# Patient Record
Sex: FEMALE | Race: BLACK OR AFRICAN AMERICAN | NOT HISPANIC OR LATINO | Employment: OTHER | URBAN - METROPOLITAN AREA
[De-identification: names, ages, dates, MRNs, and addresses within clinical notes are randomized per-mention and may not be internally consistent; named-entity substitution may affect disease eponyms.]

---

## 2017-02-10 ENCOUNTER — HISTORICAL (OUTPATIENT)
Dept: HEMATOLOGY/ONCOLOGY | Facility: CLINIC | Age: 47
End: 2017-02-10

## 2019-07-11 ENCOUNTER — HISTORICAL (OUTPATIENT)
Dept: CARDIOLOGY | Facility: HOSPITAL | Age: 49
End: 2019-07-11

## 2020-01-09 ENCOUNTER — HISTORICAL (OUTPATIENT)
Dept: ADMINISTRATIVE | Facility: HOSPITAL | Age: 50
End: 2020-01-09

## 2020-01-09 LAB
EOSINOPHIL # BLD AUTO: 0 X10(3)/MCL (ref 0–0.9)
EOSINOPHIL NFR BLD AUTO: 0.6 %
ERYTHROCYTE [DISTWIDTH] IN BLOOD BY AUTOMATED COUNT: 12.8 % (ref 11.5–17)
HCT VFR BLD AUTO: 43.4 % (ref 37–47)
HGB BLD-MCNC: 13.5 GM/DL (ref 12–16)
LYMPHOCYTES # BLD AUTO: 1.2 X10(3)/MCL (ref 0.6–4.6)
LYMPHOCYTES NFR BLD AUTO: 38.7 %
MCH RBC QN AUTO: 29.8 PG (ref 27–31)
MCHC RBC AUTO-ENTMCNC: 31.1 GM/DL (ref 33–36)
MCV RBC AUTO: 95.8 FL (ref 80–94)
MONOCYTES # BLD AUTO: 0.3 X10(3)/MCL (ref 0.1–1.3)
MONOCYTES NFR BLD AUTO: 10.1 %
NEUTROPHILS # BLD AUTO: 1.6 X10(3)/MCL (ref 2.1–9.2)
NEUTROPHILS NFR BLD AUTO: 49.7 %
PLATELET # BLD AUTO: 215 X10(3)/MCL (ref 130–400)
PMV BLD AUTO: 10.6 FL (ref 9.4–12.4)
RBC # BLD AUTO: 4.53 X10(6)/MCL (ref 4.2–5.4)
WBC # SPEC AUTO: 3.2 X10(3)/MCL (ref 4.5–11.5)

## 2021-03-14 LAB — GASTROCULT GAST QL: NEGATIVE

## 2021-09-02 ENCOUNTER — HISTORICAL (OUTPATIENT)
Dept: ADMINISTRATIVE | Facility: HOSPITAL | Age: 51
End: 2021-09-02

## 2021-10-26 ENCOUNTER — HISTORICAL (OUTPATIENT)
Dept: ADMINISTRATIVE | Facility: HOSPITAL | Age: 51
End: 2021-10-26

## 2022-03-19 ENCOUNTER — HISTORICAL (OUTPATIENT)
Dept: ADMINISTRATIVE | Facility: HOSPITAL | Age: 52
End: 2022-03-19

## 2022-04-07 ENCOUNTER — HISTORICAL (OUTPATIENT)
Dept: ADMINISTRATIVE | Facility: HOSPITAL | Age: 52
End: 2022-04-07
Payer: MEDICARE

## 2022-04-23 VITALS
HEIGHT: 66 IN | DIASTOLIC BLOOD PRESSURE: 85 MMHG | SYSTOLIC BLOOD PRESSURE: 148 MMHG | BODY MASS INDEX: 20.73 KG/M2 | WEIGHT: 129 LBS

## 2022-04-29 NOTE — PROGRESS NOTES
Patient:   Aleyda Peacock             MRN: [id number]            FIN: [id number]               Age:   49 years     Sex:  Female     :  1970   Associated Diagnoses:   None   Author:   Lyssa VILLALBA MD, Arpan NAVARRETE      Visit Information     PCP:  Dr Tuttle    Problem List:  1. Chronic ITP-- stable plts.    2. Dysregulated menstrual bleeding--much improved  3. Mild Leukopenia- chronic.   4. Crohns Disease--  managed per GI      Current therapy:  Observation--secondary to stable platelet count since 2016 (she has not required Nplate since that time)      Treatment History:  Steroids (prednisone)  Promacta 25 mg by mouth daily, mid-2016 x ~2-3 weeks. Discontinued secondary to loss of appetite generalized body aches UTI and leg pain.  N-plate given every other week.   5-7mcg/kg --started May 2012-->attempted to transition patient to Promacta in 2016. She could not tolerate medication and after ~ 2-3 weeks was changed back N-plate.  N-plate to be held if platelet count greater than 150,000    CLINICAL HISTORY:     ALEYDA PEACOCK is a  49 years old female with a recurrent history of menorrhagia and polymenorrhea as well as recurrent to bruising. She has a chronic thrombocytopenia presumably secondary to chronic ITP. This was diagnosed more than 5 years ago. She was managed conservatively for the most part with well without significant bleeding or complications. As she had extensive workup in the breast that was largely unrevealing a part from a positive CMV antibody and the positive EBV antibody. She has been on steroids at 60 mg P.O. q. daily for several months and there was gradually tapered down to 20 mg a day. She had an initial response to steroids but later on her clinic chart went down to the range between 40-70. She gives a strong family history of that her sister and her mom have very similar symptoms with the thrombocytopenia easy bruising and menorrhagia. She had workup  for von Willebrand disease at twice and was negative. There is a possibility of a rare familial thrombocytopenia but in her prior bone marrow which she had twice in 2007 and 2009 cytogenetics showed a normal female chromosomes 46xx. she has been seeing Dr. Mendoza at the [facility] a more over the past couple years and now she presents to us for further evaluation and management.   BM aspiration/Biopsy 2007= normocellular marrow with 50% cellularity. no significant dysplasia. megakaryocytes are present and mature. no evidence of an abnormal infiltrate. FLOW: failed to show any abnormal cell population.  Cytogenetics: 46 XX   BM aspiration/biopsy 2009=  active erythroid and myelopoiesis, peripheral consumption or sequestration should be considered.      She has prior history of endometriosis and she is following with Dr. Heller for that. . She has had an extensive work-up previously that included a bone marrow biopsy and CHANTELL- both which were unremarkable. Her sed rate was only slightly elevated.    Started on Nplate 5/7/12 with initially good response, which then leveled off at a dose of 1 mcg per kilogram. She was subsequently titrated upwards in her dosing and is now at 4 mcg per kilogram every other week with good response.    She had a drop in platelets and dose was increased to 5 mcg per kilogram every 2 weeks.  Her dose was then increased to 7 mcg per kilogram every 2 weeks on 10/21/2013  Patient underwent bone marrow biopsy and aspirate on June 13, 2016 out of concern/surveillance while on N-plate to ensure no transformation to myelodysplastic syndrome.  Bone marrow biopsy was done without event and showed normocellular hematopoiesis with no evidence of myelodysplasia or dysmorphic megakaryocytes.  In mid September 2016, we attempted to transition patient to Promacta. She could not tolerate medication and after ~ 2-3 weeks was changed back N-plate  She last received Nplate in July 2016 with no further  "dosing required secondary to platelet counts being greater than 200,000 consistently  Repeat CBC 17 platelets remain stable at 206,000  Repeat CBC 18 shows plts of 203K.   CBC on 18 show plts at 202K  CBC done 1/10/19:   Plts 266K.  WBC 4.0   CBC 19: WBC 4.0, Plts 222,000  CBC on 2020 showed a platelet count of 215,000    Past medical history: ITP, Crohn's disease  Procedure history: "tumor removal", oophorectomy, bone marrow biopsy, D&C done 10/31/18  Family history: noncontributory  Social history: lives at home alone.  Retired.  Regular diet, no exercise.  Denies alcohol or substance use.  Never smoker      Chief Complaint   Follow Up      Interval History   Patient here for scheduled follow up.  She does continue to have some mild bruising, but these resolve.  They do sometimes leave a little area where she can see that there was a bruise present, but overall this has not caused her any major problems.  She denies chest pain, shortness of breath, bleeding, bruising, or any other major issues at this time.       Review of Systems   14  point review of systems done in full with pertinent positives as described in interval history. Remainder of review of systems done in full and unremarkable.      Health Status   Allergies:    Allergic Reactions (Selected)  Severity Not Documented  Amoxicillin-clavulanate- No reactions were documented.  Ciprofloxacin- No reactions were documented.  Lortab- Tongue swelling and hives.,    Allergies (3) Active Reaction  amoxicillin-clavulanate None Documented  ciprofloxacin None Documented  Lortab hives     Current medications:  (Selected)   Documented Medications  Documented  CARVEDILOL   TAB 6.25M.25 mg = 1 tab(s), Oral, BID  LISINOP/HCTZ TAB 20-12.5: 1 tab(s), Oral, Daily  MEDROXYPR AC INJ 150MG/ML: 150 mg, IM, Once  Nexium 40 mg oral delayed release capsule: 1 tab, Oral, Daily, 0 Refill(s)  Pantoprazole 40 mg ORAL EC-Tablet: 40 mg = 1 tab(s), Oral, " Daily, # 30 tab(s), 0 Refill(s)  Vitamin B12 50 mcg oral tablet: 1 tab, Oral, BID, 0 Refill(s)  Zyrtec 10 mg oral tablet: 1 tab, Oral, Daily, 0 Refill(s)  amLODIPine 10 mg oral tablet: 10 mg = 1 tab(s), Oral, Daily  cloniDINE 0.1 mg oral tablet: 0.1 mg = 1 tab(s), Oral, BID  dicyclomine 10 mg oral capsule: 10 mg = 1 cap(s), Oral, BID  losartan 100 mg oral tablet: 100 mg = 1 tab(s), Oral, Daily  megestrol 40 mg oral tablet: 40 mg = 1 tab(s), Oral, BID, 0 Refill(s)  metroNIDAZOLE: 0 Refill(s)  omeprazole: 20 mg, 0 Refill(s)  prednisONE 10 mg oral tablet: as directed, 0 Refill(s)  vitamin E: 1 tab, Oral, BID, 0 Refill(s)   Problem list:    All Problems  Acute ITP / SNOMED CT 63373729 / Confirmed  Crohns disease / SNOMED CT [id number] / Confirmed  Review of care plan / SNOMED CT 7606636251 / Confirmed  Thrombocytopenia / SNOMED CT 1167474699 / Confirmed  Resolved: Idiopathic thrombocythemia / SNOMED CT 4593094256  Resolved: tumor / SNOMED CT 6577173679  Canceled: Review of care plan / SNOMED CT 0136275722,    Active Problems (4)  Acute ITP   Crohns disease   Review of care plan   Thrombocytopenia         Physical Examination   Vital Signs   1/9/2020 9:05 CST        Temperature Oral          36.8 DegC                             Temperature Oral (calculated)             98.24 DegF                             Peripheral Pulse Rate     76 bpm                             Respiratory Rate          20 br/min                             SpO2                      100 %                             Systolic Blood Pressure   146 mmHg  HI                             Diastolic Blood Pressure  88 mmHg                             Blood Pressure Location   Right arm                             Manual Cuff BP            No                             O2 SAT at rest            100 %     Measurements from flowsheet : Measurements   1/9/2020 9:05 CST        Weight Dosing             73.2 kg                              Weight Measured           73.2 kg                             Weight Measured and Calculated in Lbs     161.38 lb                             Height/Length Dosing      168.9 cm                             Height/Length Measured    168.9 cm                             BSA Measured              1.85 m2                             Body Mass Index Measured  25.66 kg/m2              Documented vital signs   General:  Alert and oriented, No acute distress.    Eye:  Extraocular movements are intact, Normal conjunctiva.    HENT:  Normocephalic, Oral mucosa is moist.    Neck:  Supple, No lymphadenopathy.    Respiratory:  Lungs are clear to auscultation, Respirations are non-labored, Breath sounds are equal.    Cardiovascular:  Normal rate, Regular rhythm, trace edema to RLE; 1+ edema to LLE.    Gastrointestinal:  Soft, Non-distended, Normal bowel sounds, slight tenderness to left lower quadrant with deep palpation.    Musculoskeletal:  No swelling, No deformity.    Integumentary:  Warm, Dry, Intact, No rash.    Neurologic:  Alert, Oriented, Normal motor function, No focal deficits.    Cognition and Speech:  Oriented, Speech clear and coherent.    ECOG Performance Scale: 0 - Fully active; no performance restrictions.      Review / Management   Results review:  Lab results   1/9/2020 8:46 CST        WBC                       3.2 x10(3)/mcL  LOW                             RBC                       4.53 x10(6)/mcL                             Hgb                       13.5 gm/dL                             Hct                       43.4 %                             Platelet                  215 x10(3)/mcL                             MCV                       95.8 fL  HI                             MCH                       29.8 pg                             MCHC                      31.1 gm/dL  LOW                             RDW                       12.8 %                             MPV                       10.6 fL                              Abs Neut                  1.58 x10(3)/mcL  LOW                             NEUT%                     49.7 %  NA                             NEUT#                     1.6 x10(3)/mcL  LOW                             LYMPH%                    38.7 %  NA                             LYMPH#                    1.2 x10(3)/mcL                             MONO%                     10.1 %  NA                             MONO#                     0.3 x10(3)/mcL                             EOS%                      0.6 %  NA                             EOS#                      0.0 x10(3)/mcL                             BASO%                     0.6 %  NA                             BASO#                     0.0 x10(3)/mcL  .       Impression and Plan   Diagnosis     1. Chronic ITP-- stable, off of N-plate and on observation  2. Chronic mild Leukopenia-- stable  3. Crohn's disease-- managed per GI  4. Weightloss/anorexia-- managed per GI  5. LLE swelling/ discomfort.     Plan:  From hematological standpoint she continues to do very well overall  Her platelet count is stable without any intervention, we will continue to observe.  She will return to clinic for a visit in 6 months.  CBC and CMP same day lab.  Continue to follow up with GI regularly   Call with questions/concerns/worsening clinical symptoms     Arpan Omalley II, MD.

## 2022-04-29 NOTE — PROGRESS NOTES
Patient:   Aleyda Peacock             MRN: [id number]            FIN: [id number]               Age:   48 years     Sex:  Female     :  1970   Associated Diagnoses:   None   Author:   Amalia Matthew NP      Visit Information     PCP:  Dr Tuttle    Problem List:  1. Chronic ITP-- stable plts.    2. Dysregulated menstrual bleeding--much improved  3. Mild Leukopenia- chronic.   4. Crohns Disease--  managed per GI      Current therapy:  Observation--secondary to stable platelet count since 2016 (she has not required Nplate since that time)      Treatment History:  Steroids (prednisone)  Promacta 25 mg by mouth daily, mid-2016 x ~2-3 weeks. Discontinued secondary to loss of appetite generalized body aches UTI and leg pain.  N-plate given every other week.   5-7mcg/kg --started May 2012-->attempted to transition patient to Promacta in 2016. She could not tolerate medication and after ~ 2-3 weeks was changed back N-plate.  N-plate to be held if platelet count greater than 150,000    CLINICAL HISTORY:     ALEYDA PEACOCK is a  46 years old female with a recurrent history of menorrhagia and polymenorrhea as well as recurrent to bruising. She has a chronic thrombocytopenia presumably secondary to chronic ITP. This was diagnosed more than 5 years ago. She was managed conservatively for the most part with well without significant bleeding or complications. As she had extensive workup in the breast that was largely unrevealing a part from a positive CMV antibody and the positive EBV antibody. She has been on steroids at 60 mg P.O. q. daily for several months and there was gradually tapered down to 20 mg a day. She had an initial response to steroids but later on her clinic chart went down to the range between 40-70. She gives a strong family history of that her sister and her mom have very similar symptoms with the thrombocytopenia easy bruising and menorrhagia. She had workup for  von Willebrand disease at twice and was negative. There is a possibility of a rare familial thrombocytopenia but in her prior bone marrow which she had twice in 2007 and 2009 cytogenetics showed a normal female chromosomes 46xx. she has been seeing Dr. Mendoza at the [facility] a more over the past couple years and now she presents to us for further evaluation and management.   BM aspiration/Biopsy 2007= normocellular marrow with 50% cellularity. no significant dysplasia. megakaryocytes are present and mature. no evidence of an abnormal infiltrate. FLOW: failed to show any abnormal cell population.  Cytogenetics: 46 XX   BM aspiration/biopsy 2009=  active erythroid and myelopoiesis, peripheral consumption or sequestration should be considered.      She has prior history of endometriosis and she is following with Dr. Heller for that. . She has had an extensive work-up previously that included a bone marrow biopsy and CHANTELL- both which were unremarkable. Her sed rate was only slightly elevated.    Started on Nplate 5/7/12 with initially good response, which then leveled off at a dose of 1 mcg per kilogram. She was subsequently titrated upwards in her dosing and is now at 4 mcg per kilogram every other week with good response.    She had a drop in platelets and dose was increased to 5 mcg per kilogram every 2 weeks.  Her dose was then increased to 7 mcg per kilogram every 2 weeks on 10/21/2013  Patient underwent bone marrow biopsy and aspirate on June 13, 2016 out of concern/surveillance while on N-plate to ensure no transformation to myelodysplastic syndrome.  Bone marrow biopsy was done without event and showed normocellular hematopoiesis with no evidence of myelodysplasia or dysmorphic megakaryocytes.  In mid September 2016, we attempted to transition patient to Promacta. She could not tolerate medication and after ~ 2-3 weeks was changed back N-plate  She last received Nplate in July 2016 with no further  "dosing required secondary to platelet counts being greater than 200,000 consistently  Repeat CBC 12/14/17 platelets remain stable at 206,000  Repeat CBC 4/19/18 shows plts of 203K.   CBC on 9/12/18 show plts at 202K  CBC done 1/10/19:   Plts 266K.  WBC 4.0   CBC 7/11/19: WBC 4.0, Plts 222,000    Past medical history: ITP, Crohn's disease  Procedure history: "tumor removal", oophorectomy, bone marrow biopsy, D&C done 10/31/18  Family history: noncontributory  Social history: lives at home alone.  Retired.  Regular diet, no exercise.  Denies alcohol or substance use.  Never smoker      Chief Complaint   Follow Up      Interval History   Patient here for scheduled follow up. She reports she is continuing to have problems related to her Crohn' disease. She is seeing GI regularly with last appt approximately 2 weeks ago. She reports significant abdominal cramping but denies recurrent episodes of blood in stool. Denies melena as well. She reports weightloss and diminished appetite s/t her GI symptoms. She has been started on megace for appetite stimulation per GI. Weight down approximately 5lbs from last clinic visit 6 months ago. From hematological standpoint she is doing very well overall. Repeat labs today continue to show stability of counts- WBC at 4.0, platelets at 222,000. Remainder of CBC unremarkble. She reports intermittent bruising which is unchanged. Denies recent fevers or infections. Denies HA,SOB, CP. No night sweats. Energy stable overall. No other questions noted.   On examination some increased swelling noted to LLE- patient reports intermittent discomfort to left leg. Denies warmth/erythema.       Review of Systems   14  point review of systems done in full with pertinent positives as described in interval history. Remainder of review of systems done in full and unremarkable.      Health Status   Allergies:    Allergic Reactions (Selected)  Severity Not Documented  Amoxicillin-clavulanate- No reactions " were documented.  Ciprofloxacin- No reactions were documented.  Lortab- Tongue swelling and hives.,    Allergies (3) Active Reaction  amoxicillin-clavulanate None Documented  ciprofloxacin None Documented  Lortab hives     Current medications:  (Selected)   Documented Medications  Documented  CARVEDILOL   TAB 6.25M.25 mg = 1 tab(s), Oral, BID  LISINOP/HCTZ TAB 20-12.5: 1 tab(s), Oral, Daily  MEDROXYPR AC INJ 150MG/ML: 150 mg, IM, Once  Nexium 40 mg oral delayed release capsule: 1 tab, Oral, Daily, 0 Refill(s)  Pantoprazole 40 mg ORAL EC-Tablet: 40 mg = 1 tab(s), Oral, Daily, # 30 tab(s), 0 Refill(s)  Vitamin B12 50 mcg oral tablet: 1 tab, Oral, BID, 0 Refill(s)  Zyrtec 10 mg oral tablet: 1 tab, Oral, Daily, 0 Refill(s)  amLODIPine 10 mg oral tablet: 10 mg = 1 tab(s), Oral, Daily  dicyclomine 10 mg oral capsule: 10 mg = 1 cap(s), Oral, BID  megestrol 40 mg oral tablet: 40 mg = 1 tab(s), Oral, BID, 0 Refill(s)  metroNIDAZOLE: 0 Refill(s)  omeprazole: 20 mg, 0 Refill(s)  prednisONE 10 mg oral tablet: as directed, 0 Refill(s)  vitamin E: 1 tab, Oral, BID, 0 Refill(s)   Problem list:    All Problems  Acute ITP / SNOMED CT 15403482 / Confirmed  Crohns disease / SNOMED CT [id number] / Confirmed  Review of care plan / SNOMED CT 8402048351 / Confirmed  Thrombocytopenia / SNOMED CT 7614503330 / Confirmed,    Active Problems (4)  Acute ITP   Crohns disease   Review of care plan   Thrombocytopenia         Physical Examination   Vital Signs   2019 9:27 CDT       Temperature Oral          36.8 DegC                             Temperature Oral (calculated)             98.24 DegF                             Peripheral Pulse Rate     75 bpm                             Respiratory Rate          20 br/min                             SpO2                      98 %                             Systolic Blood Pressure   142 mmHg  HI                             Diastolic Blood Pressure  83 mmHg                              Blood Pressure Location   Right arm                             Manual Cuff BP            No                             O2 SAT at rest            98 %     Measurements from flowsheet : Measurements   7/11/2019 9:27 CDT       Weight Dosing             69.2 kg                             Weight Measured           69.2 kg                             Weight Measured and Calculated in Lbs     152.56 lb                             Height/Length Dosing      168 cm                             Height/Length Measured    168 cm                             BSA Measured              1.8 m2                             Body Mass Index Measured  24.52 kg/m2              Documented vital signs   General:  Alert and oriented, No acute distress.    Eye:  Extraocular movements are intact, Normal conjunctiva.    HENT:  Normocephalic, Oral mucosa is moist.    Neck:  Supple, No lymphadenopathy.    Respiratory:  Lungs are clear to auscultation, Respirations are non-labored, Breath sounds are equal.    Cardiovascular:  Normal rate, Regular rhythm, trace edema to RLE; 1+ edema to LLE.    Gastrointestinal:  Soft, Non-distended, Normal bowel sounds, slight tenderness to left lower quadrant with deep palpation.    Musculoskeletal:  No swelling, No deformity.    Integumentary:  Warm, Dry, Intact, No rash.    Neurologic:  Alert, Oriented, Normal motor function, No focal deficits.    Cognition and Speech:  Oriented, Speech clear and coherent.    ECOG Performance Scale: 0 - Fully active; no performance restrictions.      Review / Management   Results review:  Last 10 Days Lab Results : Lab View   7/11/2019 9:21 CDT       WBC                       4.0 x10(3)/mcL  LOW                             RBC                       4.28 x10(6)/mcL                             Hgb                       12.7 gm/dL                             Hct                       41.3 %                             Platelet                  222 x10(3)/mcL                              MCV                       96.5 fL  HI                             MCH                       29.7 pg                             MCHC                      30.8 gm/dL  LOW                             RDW                       12.5 %                             MPV                       10.9 fL                             Abs Neut                  2.14 x10(3)/mcL                             NEUT%                     53.3 %  NA                             NEUT#                     2.1 x10(3)/mcL                             LYMPH%                    35.8 %  NA                             LYMPH#                    1.4 x10(3)/mcL                             MONO%                     9.7 %  NA                             MONO#                     0.4 x10(3)/mcL                             EOS%                      0.5 %  NA                             EOS#                      0.0 x10(3)/mcL                             BASO%                     0.5 %  NA                             BASO#                     0.0 x10(3)/mcL  .       Impression and Plan   Diagnosis     1. Chronic ITP-- stable, off of N-plate and on observation  2. Chronic mild Leukopenia-- stable  3. Crohn's disease-- managed per GI  4. Weightloss/anorexia-- managed per GI  5. LLE swelling/ discomfort.     Plan:  From hematological standpoint she continues to do very well overall  Will obtain left leg venous NIVA today to r/o DVT given unilateral swelling/discomfort  Given overall stability of counts over the last several years, discussed the possibility of moving back clinic visits to annually. However, patient refused at this time stating she was more comfortable keeping her visits at every 6 month intervals   Therefore will RTC in 6 months for OV and clinic visit  CBC same day lab   Continue to follow up with GI regularly   Call with questions/concerns/worsening clinical symptoms     SYDNIE Giraldo.

## 2022-08-17 DIAGNOSIS — D69.3 CHRONIC ITP (IDIOPATHIC THROMBOCYTOPENIC PURPURA): Primary | ICD-10-CM

## 2022-09-28 ENCOUNTER — OFFICE VISIT (OUTPATIENT)
Dept: HEMATOLOGY/ONCOLOGY | Facility: CLINIC | Age: 52
End: 2022-09-28
Payer: MEDICARE

## 2022-09-28 ENCOUNTER — LAB VISIT (OUTPATIENT)
Dept: LAB | Facility: HOSPITAL | Age: 52
End: 2022-09-28
Attending: INTERNAL MEDICINE
Payer: MEDICARE

## 2022-09-28 VITALS
HEART RATE: 67 BPM | BODY MASS INDEX: 18.88 KG/M2 | SYSTOLIC BLOOD PRESSURE: 139 MMHG | OXYGEN SATURATION: 100 % | RESPIRATION RATE: 18 BRPM | WEIGHT: 117.5 LBS | TEMPERATURE: 99 F | DIASTOLIC BLOOD PRESSURE: 70 MMHG | HEIGHT: 66 IN

## 2022-09-28 DIAGNOSIS — D69.3 CHRONIC IDIOPATHIC THROMBOCYTOPENIC PURPURA: Primary | ICD-10-CM

## 2022-09-28 DIAGNOSIS — D69.3 CHRONIC ITP (IDIOPATHIC THROMBOCYTOPENIC PURPURA): ICD-10-CM

## 2022-09-28 PROBLEM — I10 HYPERTENSION: Status: ACTIVE | Noted: 2022-09-28

## 2022-09-28 PROBLEM — K21.9 GASTROESOPHAGEAL REFLUX DISEASE: Status: ACTIVE | Noted: 2022-09-28

## 2022-09-28 PROBLEM — M19.90 ARTHRITIS: Status: ACTIVE | Noted: 2022-09-28

## 2022-09-28 PROBLEM — D69.6 THROMBOCYTOPENIA: Status: ACTIVE | Noted: 2022-09-28

## 2022-09-28 LAB
ALBUMIN SERPL-MCNC: 4.2 GM/DL (ref 3.5–5)
ALBUMIN/GLOB SERPL: 1.4 RATIO (ref 1.1–2)
ALP SERPL-CCNC: 55 UNIT/L (ref 40–150)
ALT SERPL-CCNC: 45 UNIT/L (ref 0–55)
AST SERPL-CCNC: 28 UNIT/L (ref 5–34)
BASOPHILS # BLD AUTO: 0.02 X10(3)/MCL (ref 0–0.2)
BASOPHILS NFR BLD AUTO: 0.6 %
BILIRUBIN DIRECT+TOT PNL SERPL-MCNC: 0.7 MG/DL
BUN SERPL-MCNC: 9.9 MG/DL (ref 9.8–20.1)
CALCIUM SERPL-MCNC: 9.8 MG/DL (ref 8.4–10.2)
CHLORIDE SERPL-SCNC: 109 MMOL/L (ref 98–107)
CO2 SERPL-SCNC: 26 MMOL/L (ref 22–29)
CREAT SERPL-MCNC: 0.91 MG/DL (ref 0.55–1.02)
EOSINOPHIL # BLD AUTO: 0.02 X10(3)/MCL (ref 0–0.9)
EOSINOPHIL NFR BLD AUTO: 0.6 %
ERYTHROCYTE [DISTWIDTH] IN BLOOD BY AUTOMATED COUNT: 13.3 % (ref 11.5–17)
GFR SERPLBLD CREATININE-BSD FMLA CKD-EPI: >60 MLS/MIN/1.73/M2
GLOBULIN SER-MCNC: 3.1 GM/DL (ref 2.4–3.5)
GLUCOSE SERPL-MCNC: 110 MG/DL (ref 74–100)
HCT VFR BLD AUTO: 40.3 % (ref 37–47)
HGB BLD-MCNC: 12 GM/DL (ref 12–16)
IMM GRANULOCYTES # BLD AUTO: 0 X10(3)/MCL (ref 0–0.04)
IMM GRANULOCYTES NFR BLD AUTO: 0 %
LYMPHOCYTES # BLD AUTO: 1.24 X10(3)/MCL (ref 0.6–4.6)
LYMPHOCYTES NFR BLD AUTO: 37.3 %
MCH RBC QN AUTO: 28.6 PG (ref 27–31)
MCHC RBC AUTO-ENTMCNC: 29.8 MG/DL (ref 33–36)
MCV RBC AUTO: 96 FL (ref 80–94)
MONOCYTES # BLD AUTO: 0.35 X10(3)/MCL (ref 0.1–1.3)
MONOCYTES NFR BLD AUTO: 10.5 %
NEUTROPHILS # BLD AUTO: 1.7 X10(3)/MCL (ref 2.1–9.2)
NEUTROPHILS NFR BLD AUTO: 51 %
PLATELET # BLD AUTO: 186 X10(3)/MCL (ref 130–400)
PMV BLD AUTO: 10.6 FL (ref 7.4–10.4)
POTASSIUM SERPL-SCNC: 4 MMOL/L (ref 3.5–5.1)
PROT SERPL-MCNC: 7.3 GM/DL (ref 6.4–8.3)
RBC # BLD AUTO: 4.2 X10(6)/MCL (ref 4.2–5.4)
SODIUM SERPL-SCNC: 139 MMOL/L (ref 136–145)
WBC # SPEC AUTO: 3.3 X10(3)/MCL (ref 4.5–11.5)

## 2022-09-28 PROCEDURE — 36415 COLL VENOUS BLD VENIPUNCTURE: CPT

## 2022-09-28 PROCEDURE — 80053 COMPREHEN METABOLIC PANEL: CPT

## 2022-09-28 PROCEDURE — 85025 COMPLETE CBC W/AUTO DIFF WBC: CPT

## 2022-09-28 PROCEDURE — 99213 PR OFFICE/OUTPT VISIT, EST, LEVL III, 20-29 MIN: ICD-10-PCS | Mod: S$PBB,,, | Performed by: NURSE PRACTITIONER

## 2022-09-28 PROCEDURE — 99999 PR PBB SHADOW E&M-EST. PATIENT-LVL III: ICD-10-PCS | Mod: PBBFAC,,, | Performed by: NURSE PRACTITIONER

## 2022-09-28 PROCEDURE — 99213 OFFICE O/P EST LOW 20 MIN: CPT | Mod: PBBFAC | Performed by: NURSE PRACTITIONER

## 2022-09-28 PROCEDURE — 99213 OFFICE O/P EST LOW 20 MIN: CPT | Mod: S$PBB,,, | Performed by: NURSE PRACTITIONER

## 2022-09-28 PROCEDURE — 99999 PR PBB SHADOW E&M-EST. PATIENT-LVL III: CPT | Mod: PBBFAC,,, | Performed by: NURSE PRACTITIONER

## 2022-09-28 RX ORDER — VENLAFAXINE HYDROCHLORIDE 150 MG/1
CAPSULE, EXTENDED RELEASE ORAL
COMMUNITY
Start: 2022-05-31 | End: 2022-09-28 | Stop reason: ALTCHOICE

## 2022-09-28 RX ORDER — HYDROXYZINE PAMOATE 25 MG/1
CAPSULE ORAL
COMMUNITY
Start: 2022-07-14 | End: 2022-09-28 | Stop reason: ALTCHOICE

## 2022-09-28 RX ORDER — MEDROXYPROGESTERONE ACETATE 150 MG/ML
INJECTION, SUSPENSION INTRAMUSCULAR
COMMUNITY
Start: 2022-08-25

## 2022-09-28 RX ORDER — SUCRALFATE 1 G/10ML
SUSPENSION ORAL
COMMUNITY
Start: 2022-07-26 | End: 2022-09-28

## 2022-09-28 RX ORDER — LOSARTAN POTASSIUM 100 MG/1
TABLET ORAL
COMMUNITY
Start: 2022-05-31 | End: 2022-09-28 | Stop reason: ALTCHOICE

## 2022-09-28 RX ORDER — PANTOPRAZOLE SODIUM 40 MG/1
40 TABLET, DELAYED RELEASE ORAL DAILY
COMMUNITY
Start: 2022-09-12 | End: 2024-01-21

## 2022-09-28 RX ORDER — DICYCLOMINE HYDROCHLORIDE 10 MG/1
CAPSULE ORAL
COMMUNITY
Start: 2022-07-12 | End: 2022-09-28 | Stop reason: ALTCHOICE

## 2022-09-28 RX ORDER — AMLODIPINE BESYLATE 5 MG/1
5 TABLET ORAL DAILY
COMMUNITY
Start: 2022-09-12

## 2022-09-28 RX ORDER — MIRTAZAPINE 15 MG/1
TABLET, FILM COATED ORAL
COMMUNITY
Start: 2022-05-31 | End: 2022-09-28 | Stop reason: ALTCHOICE

## 2022-09-28 RX ORDER — CLONAZEPAM 0.5 MG/1
0.5 TABLET ORAL 2 TIMES DAILY
COMMUNITY
Start: 2022-08-31

## 2022-09-28 NOTE — PROGRESS NOTES
Subjective:       Patient ID: Aleyda Peacock is a 51 y.o. female.    Chief Complaint: Follow-up      Diagnosis:  1. Chronic ITP-- stable plts.    2. Dysregulated menstrual bleeding--much improved  3. Mild Leukopenia- chronic.   4. Crohns Disease--  managed per GI    Current Treatment:   Observation--secondary to stable platelet count since July 2016 (she has not required Nplate since that time)    Treatment History:  Steroids (prednisone)  Promacta 25 mg by mouth daily, mid-September 2016 x ~2-3 weeks. Discontinued secondary to loss of appetite generalized body aches UTI and leg pain.  N-plate given every other week.   5-7mcg/kg --started May 2012-->attempted to transition patient to Promacta in September 2016. She could not tolerate medication and after ~ 2-3 weeks was changed back N-plate.  N-plate to be held if platelet count greater than 150,000       HPI  ALEYDA PEACOCK is a 51 years old female with a recurrent history of menorrhagia and polymenorrhea as well as recurrent to bruising. She has a chronic thrombocytopenia presumably secondary to chronic ITP. This was diagnosed more than 5 years ago. She was managed conservatively for the most part with well without significant bleeding or complications. As she had extensive workup in the breast that was largely unrevealing a part from a positive CMV antibody and the positive EBV antibody. She has been on steroids at 60 mg P.O. q. daily for several months and there was gradually tapered down to 20 mg a day. She had an initial response to steroids but later on her clinic chart went down to the range between 40-70. She gives a strong family history of that her sister and her mom have very similar symptoms with the thrombocytopenia easy bruising and menorrhagia. She had workup for von Willebrand disease at twice and was negative. There is a possibility of a rare familial thrombocytopenia but in her prior bone marrow which she had twice in 2007 and 2009  cytogenetics showed a normal female chromosomes 46xx. she has been seeing Dr. Mendoza at the [facility] a more over the past couple years and now she presents to us for further evaluation and management.      BM aspiration/Biopsy 2007 showed normocellular marrow with 50% cellularity. no significant dysplasia. megakaryocytes are present and mature. no evidence of an abnormal infiltrate. FLOW: failed to show any abnormal cell population.  Cytogenetics: 46 XX.  Repeat on BM aspiration/biopsy 2009 showed active erythroid and myelopoiesis, peripheral consumption or sequestration should be considered.       Started on Nplate 5/7/2012 with initially good response, which then leveled off at a dose of 1 mcg per kilogram. She was subsequently titrated upwards in her dosing and is now at 4 mcg per kilogram every other week with good response.    She had a drop in platelets and dose was increased to 5 mcg per kilogram every 2 weeks.  Her dose was then increased to 7 mcg per kilogram every 2 weeks on 10/21/2013     Patient underwent bone marrow biopsy and aspirate on June 13, 2016 out of concern/surveillance while on N-plate to ensure no transformation to myelodysplastic syndrome.  Bone marrow biopsy was done without event and showed normocellular hematopoiesis with no evidence of myelodysplasia or dysmorphic megakaryocytes.  In mid September 2016, we attempted to transition patient to Promacta. She could not tolerate medication and after ~ 2-3 weeks was changed back N-plate.  She last received Nplate in July 2016 with no further dosing required secondary to platelet counts being greater than 200,000 consistently     Interval History:   Patient is here today for a follow-up for chronic ITP.  She states that she is not recently had any issues with bleeding.  Since her PCP resumed her Depo-Provera shot she is not had any issues with bleeding.  She is currently being treated for persistent headache, and is scheduled for a  scan tomorrow.    .   Past Medical History:   Diagnosis Date    Chronic ITP (idiopathic thrombocytopenia)     Crohn's disease     GERD (gastroesophageal reflux disease)     Hypertension     Leukopenia     Psychosomatic disorder     Severe anxiety       Past Surgical History:   Procedure Laterality Date    BONE MARROW BIOPSY  06/13/2016    DILATION AND CURETTAGE OF UTERUS  10/31/2018    PARTIAL HYSTERECTOMY       Social History     Socioeconomic History    Marital status: Unknown   Tobacco Use    Smoking status: Never    Smokeless tobacco: Never   Substance and Sexual Activity    Alcohol use: Never    Drug use: Never      Family History   Problem Relation Age of Onset    Diabetes Mother     Hypertension Mother     Heart attack Mother     Heart disease Mother     Diabetes Father     Brain cancer Father     ALYSE disease Father     Heart disease Sister     Diabetes Brother       Review of patient's allergies indicates:   Allergen Reactions    Ketorolac      Other reaction(s): SOB, Stops breathing    Prednisone Shortness Of Breath     Other reaction(s): SOB    Amoxicillin-pot clavulanate      Other reaction(s): Hives, RASH    Azithromycin      Other reaction(s): RASH    Ciprofloxacin      Other reaction(s): Hives, RASH    Clindamycin      Other reaction(s): Rash    Diphenoxylate-atropine      Other reaction(s): Hives, RASH    Hydrocodone-acetaminophen      Other reaction(s): Hives, hives, SWELLING, tongue swelling    Iodine      Other reaction(s): Difficulty breathing, Hives, SOB, Swelling    Penicillin      Other reaction(s): Hives, RASH    Shellfish containing products      Other reaction(s): Hives, RASH    Sulfamethoxazole-trimethoprim      Other reaction(s): Hives, RASH      Review of Systems   Constitutional:  Negative for fever.   Cardiovascular:  Negative for leg swelling.   Gastrointestinal:  Negative for anal bleeding and blood in stool.   Genitourinary:  Negative for hematuria, menstrual problem and vaginal  bleeding.   Neurological:  Positive for headaches.       Objective:      Physical Exam  Vitals reviewed.   Constitutional:       General: She is awake. She is not in acute distress.     Appearance: Normal appearance. She is well-groomed.   HENT:      Head: Normocephalic.   Eyes:      General: Lids are normal. No scleral icterus.     Extraocular Movements: Extraocular movements intact.      Conjunctiva/sclera: Conjunctivae normal.   Cardiovascular:      Rate and Rhythm: Normal rate and regular rhythm.   Pulmonary:      Effort: Pulmonary effort is normal.      Breath sounds: Normal breath sounds.   Chest:      Chest wall: No tenderness.   Abdominal:      General: Bowel sounds are normal. There is no distension.      Palpations: Abdomen is soft.      Tenderness: There is no abdominal tenderness. There is no guarding.   Musculoskeletal:         General: No swelling, tenderness or deformity.      Cervical back: Full passive range of motion without pain and neck supple. No tenderness.      Right lower leg: No edema.      Left lower leg: No edema.   Skin:     General: Skin is warm and dry.      Coloration: Skin is not jaundiced or pale.   Neurological:      General: No focal deficit present.      Mental Status: She is alert and oriented to person, place, and time.      Motor: No weakness.      Gait: Gait is intact.   Psychiatric:         Mood and Affect: Mood normal.         Speech: Speech normal.         Behavior: Behavior normal. Behavior is cooperative.       LABS REVIEWED IN [facility]          Assessment:   1. Chronic ITP-- stable, off of N-plate and on observation  2. Chronic mild Leukopenia-- stable  3. Crohn's disease-- managed per GI  4. Weightloss/anorexia-- managed per GI             Plan:     From a hematological standpoint she is doing very well. Plt count today is 186     Continue to follow up with Immunologist as scheduled     Continue to follow up with Dermatologist as scheduled       RTC in 6 months for OV and  clinical examination     Labs: CBC, CMP     All questions were answered to the best of my ability.        Asha Cruz, RAHEL-C

## 2022-12-19 ENCOUNTER — PATIENT OUTREACH (OUTPATIENT)
Dept: ADMINISTRATIVE | Facility: HOSPITAL | Age: 52
End: 2022-12-19
Payer: MEDICARE

## 2023-01-17 DIAGNOSIS — K21.9 ACID REFLUX: Primary | ICD-10-CM

## 2023-01-25 ENCOUNTER — HOSPITAL ENCOUNTER (OUTPATIENT)
Dept: RADIOLOGY | Facility: HOSPITAL | Age: 53
Discharge: HOME | End: 2023-01-25
Attending: FAMILY MEDICINE
Payer: MEDICARE

## 2023-01-25 DIAGNOSIS — K21.9 ACID REFLUX: ICD-10-CM

## 2023-01-25 PROCEDURE — A9698 NON-RAD CONTRAST MATERIALNOC: HCPCS

## 2023-01-25 PROCEDURE — 25500020 PHARM REV CODE 255

## 2023-01-25 PROCEDURE — 74240 X-RAY XM UPR GI TRC 1CNTRST: CPT | Mod: TC

## 2023-01-25 RX ADMIN — BARIUM SULFATE 135 ML: 980 POWDER, FOR SUSPENSION ORAL at 08:01

## 2023-05-11 NOTE — PROGRESS NOTES
Subjective:       Patient ID: Aleyda Peacock is a 52 y.o. female.    Chief Complaint: No chief complaint on file.      Diagnosis:  Chronic ITP-- stable plts.    Dysregulated menstrual bleeding--much improved  Mild Leukopenia- chronic.   Crohns Disease--  managed per GI    Current Treatment:   Observation--secondary to stable platelet count since July 2016 (she has not required Nplate since that time)    Treatment History:  Steroids (prednisone)  Promacta 25 mg by mouth daily, mid-September 2016 x ~2-3 weeks. Discontinued secondary to loss of appetite generalized body aches UTI and leg pain.  N-plate given every other week.   5-7mcg/kg --started May 2012-->attempted to transition patient to Promacta in September 2016. She could not tolerate medication and after ~ 2-3 weeks was changed back N-plate.  N-plate to be held if platelet count greater than 150,000     HPI:  ALEYDA PEACOCK is a 52 years old female with a recurrent history of menorrhagia and polymenorrhea as well as recurrent to bruising. She has a chronic thrombocytopenia presumably secondary to chronic ITP. This was diagnosed more than 5 years ago. She was managed conservatively for the most part with well without significant bleeding or complications. As she had extensive workup in the breast that was largely unrevealing a part from a positive CMV antibody and the positive EBV antibody. She has been on steroids at 60 mg P.O. q. daily for several months and there was gradually tapered down to 20 mg a day. She had an initial response to steroids but later on her clinic chart went down to the range between 40-70. She gives a strong family history of that her sister and her mom have very similar symptoms with the thrombocytopenia easy bruising and menorrhagia. She had workup for von Willebrand disease at twice and was negative. There is a possibility of a rare familial thrombocytopenia but in her prior bone marrow which she had twice in 2007 and 2009  cytogenetics showed a normal female chromosomes 46xx. she has been seeing Dr. Mendoza at the [facility] a more over the past couple years and now she presents to us for further evaluation and management.      BM aspiration/Biopsy 2007 showed normocellular marrow with 50% cellularity. no significant dysplasia. megakaryocytes are present and mature. no evidence of an abnormal infiltrate. FLOW: failed to show any abnormal cell population.  Cytogenetics: 46 XX.  Repeat on BM aspiration/biopsy 2009 showed active erythroid and myelopoiesis, peripheral consumption or sequestration should be considered.       Started on Nplate 5/7/2012 with initially good response, which then leveled off at a dose of 1 mcg per kilogram. She was subsequently titrated upwards in her dosing and is now at 4 mcg per kilogram every other week with good response.    She had a drop in platelets and dose was increased to 5 mcg per kilogram every 2 weeks.  Her dose was then increased to 7 mcg per kilogram every 2 weeks on 10/21/2013     Patient underwent bone marrow biopsy and aspirate on June 13, 2016 out of concern/surveillance while on N-plate to ensure no transformation to myelodysplastic syndrome. Bone marrow biopsy was done without event and showed normocellular hematopoiesis with no evidence of myelodysplasia or dysmorphic megakaryocytes.  In mid September 2016, we attempted to transition patient to Promacta. She could not tolerate medication and after ~ 2-3 weeks was changed back N-plate.  She last received Nplate in July 2016 with no further dosing required secondary to platelet counts being greater than 200,000 consistently     Interval History:   Patient is here today for a follow-up for chronic ITP.  From a hematology standpoint, she is doing well.  She did have to switch OBGYN physicians, she saw physician in March.  She stated that he was not the perfect doctor.   She stated that he refused to review her medical records and  that he performed a procedure, and during that procedure used a solution that she was allergic to.   She then stated that since that time, she is had chronic symptoms consistent with UTIs and a uterine infection per her PCP.  She is been referred to Urology, but has not gotten an appointment yet.  She denies any bleeding or bruising.  She denies any recurrent or high fevers.    .   Past Medical History:   Diagnosis Date    Chronic ITP (idiopathic thrombocytopenia)     Crohn's disease     GERD (gastroesophageal reflux disease)     Hypertension     Leukopenia     Psychosomatic disorder     Severe anxiety       Past Surgical History:   Procedure Laterality Date    BONE MARROW BIOPSY  06/13/2016    DILATION AND CURETTAGE OF UTERUS  10/31/2018    PARTIAL HYSTERECTOMY       Social History     Socioeconomic History    Marital status: Unknown   Tobacco Use    Smoking status: Never    Smokeless tobacco: Never   Substance and Sexual Activity    Alcohol use: Never    Drug use: Never      Family History   Problem Relation Age of Onset    Diabetes Mother     Hypertension Mother     Heart attack Mother     Heart disease Mother     Diabetes Father     Brain cancer Father     ALYSE disease Father     Heart disease Sister     Diabetes Brother       Review of patient's allergies indicates:   Allergen Reactions    Ketorolac      Other reaction(s): SOB, Stops breathing    Prednisone Shortness Of Breath     Other reaction(s): SOB    Amoxicillin-pot clavulanate      Other reaction(s): Hives, RASH    Azithromycin      Other reaction(s): RASH    Ciprofloxacin      Other reaction(s): Hives, RASH    Clindamycin      Other reaction(s): Rash    Diphenoxylate-atropine      Other reaction(s): Hives, RASH    Hydrocodone-acetaminophen      Other reaction(s): Hives, hives, SWELLING, tongue swelling    Iodine      Other reaction(s): Difficulty breathing, Hives, SOB, Swelling    Penicillin      Other reaction(s): Hives, RASH    Shellfish containing  products      Other reaction(s): Hives, RASH    Sulfamethoxazole-trimethoprim      Other reaction(s): Hives, RASH      Review of Systems   Constitutional:  Negative for fever.   Cardiovascular:  Negative for leg swelling.   Gastrointestinal:  Negative for anal bleeding and blood in stool.   Genitourinary:  Negative for hematuria, menstrual problem and vaginal bleeding.   Neurological:  Positive for headaches.       Objective:      Physical Exam  Vitals reviewed.   Constitutional:       General: She is awake. She is not in acute distress.     Appearance: Normal appearance. She is well-groomed.   HENT:      Head: Normocephalic.   Eyes:      General: Lids are normal. No scleral icterus.     Extraocular Movements: Extraocular movements intact.      Conjunctiva/sclera: Conjunctivae normal.   Cardiovascular:      Rate and Rhythm: Normal rate and regular rhythm.   Pulmonary:      Effort: Pulmonary effort is normal.      Breath sounds: Normal breath sounds.   Chest:      Chest wall: No tenderness.   Abdominal:      General: Bowel sounds are normal. There is no distension.      Palpations: Abdomen is soft.      Tenderness: There is no abdominal tenderness. There is no guarding.   Musculoskeletal:         General: No swelling, tenderness or deformity.      Cervical back: Full passive range of motion without pain and neck supple. No tenderness.      Right lower leg: No edema.      Left lower leg: No edema.   Skin:     General: Skin is warm and dry.      Coloration: Skin is not jaundiced or pale.   Neurological:      General: No focal deficit present.      Mental Status: She is alert and oriented to person, place, and time.      Motor: No weakness.      Gait: Gait is intact.   Psychiatric:         Mood and Affect: Mood normal.         Speech: Speech normal.         Behavior: Behavior normal. Behavior is cooperative.       LABS REVIEWED IN [facility]          Assessment:   Chronic ITP-- stable, off of N-plate and on  observation  Chronic mild Leukopenia-- stable  Crohn's disease-- managed per GI  Weightloss/anorexia-- managed per GI      Plan:       From a hematological standpoint she is doing very well.  Platelet count today is 195,000.     Continue to follow up with Immunologist as scheduled     Continue to follow up with Dermatologist as scheduled    We will refer to [facility] in [place], [place].     RTC in 6 months for OV and clinical examination     Labs: CBC, CMP     All questions were answered to the best of my ability.        Arpan Omalley II, MD

## 2023-05-16 ENCOUNTER — OFFICE VISIT (OUTPATIENT)
Dept: HEMATOLOGY/ONCOLOGY | Facility: CLINIC | Age: 53
End: 2023-05-16
Payer: MEDICARE

## 2023-05-16 ENCOUNTER — LAB VISIT (OUTPATIENT)
Dept: LAB | Facility: HOSPITAL | Age: 53
End: 2023-05-16
Attending: INTERNAL MEDICINE
Payer: MEDICARE

## 2023-05-16 VITALS
TEMPERATURE: 99 F | DIASTOLIC BLOOD PRESSURE: 84 MMHG | BODY MASS INDEX: 21.05 KG/M2 | WEIGHT: 126.38 LBS | OXYGEN SATURATION: 100 % | SYSTOLIC BLOOD PRESSURE: 145 MMHG | HEIGHT: 65 IN | HEART RATE: 71 BPM

## 2023-05-16 DIAGNOSIS — N39.0 URINARY TRACT INFECTION WITHOUT HEMATURIA, SITE UNSPECIFIED: ICD-10-CM

## 2023-05-16 DIAGNOSIS — D69.3 CHRONIC IDIOPATHIC THROMBOCYTOPENIC PURPURA: Primary | ICD-10-CM

## 2023-05-16 DIAGNOSIS — D69.3 CHRONIC IDIOPATHIC THROMBOCYTOPENIC PURPURA: ICD-10-CM

## 2023-05-16 LAB
ALBUMIN SERPL-MCNC: 4.1 G/DL (ref 3.5–5)
ALBUMIN/GLOB SERPL: 1.3 RATIO (ref 1.1–2)
ALP SERPL-CCNC: 57 UNIT/L (ref 40–150)
ALT SERPL-CCNC: 30 UNIT/L (ref 0–55)
AST SERPL-CCNC: 31 UNIT/L (ref 5–34)
BASOPHILS # BLD AUTO: 0.03 X10(3)/MCL
BASOPHILS NFR BLD AUTO: 1 %
BILIRUBIN DIRECT+TOT PNL SERPL-MCNC: 0.7 MG/DL
BUN SERPL-MCNC: 7.3 MG/DL (ref 9.8–20.1)
CALCIUM SERPL-MCNC: 9.7 MG/DL (ref 8.4–10.2)
CHLORIDE SERPL-SCNC: 106 MMOL/L (ref 98–107)
CO2 SERPL-SCNC: 25 MMOL/L (ref 22–29)
CREAT SERPL-MCNC: 0.98 MG/DL (ref 0.55–1.02)
EOSINOPHIL # BLD AUTO: 0.01 X10(3)/MCL (ref 0–0.9)
EOSINOPHIL NFR BLD AUTO: 0.3 %
ERYTHROCYTE [DISTWIDTH] IN BLOOD BY AUTOMATED COUNT: 11.9 % (ref 11.5–17)
GFR SERPLBLD CREATININE-BSD FMLA CKD-EPI: >60 MLS/MIN/1.73/M2
GLOBULIN SER-MCNC: 3.2 GM/DL (ref 2.4–3.5)
GLUCOSE SERPL-MCNC: 76 MG/DL (ref 74–100)
HCT VFR BLD AUTO: 42.5 % (ref 37–47)
HGB BLD-MCNC: 12.8 G/DL (ref 12–16)
IMM GRANULOCYTES # BLD AUTO: 0 X10(3)/MCL (ref 0–0.04)
IMM GRANULOCYTES NFR BLD AUTO: 0 %
LYMPHOCYTES # BLD AUTO: 1.2 X10(3)/MCL (ref 0.6–4.6)
LYMPHOCYTES NFR BLD AUTO: 39.2 %
MCH RBC QN AUTO: 29.2 PG (ref 27–31)
MCHC RBC AUTO-ENTMCNC: 30.1 G/DL (ref 33–36)
MCV RBC AUTO: 97 FL (ref 80–94)
MONOCYTES # BLD AUTO: 0.3 X10(3)/MCL (ref 0.1–1.3)
MONOCYTES NFR BLD AUTO: 9.8 %
NEUTROPHILS # BLD AUTO: 1.52 X10(3)/MCL (ref 2.1–9.2)
NEUTROPHILS NFR BLD AUTO: 49.7 %
PLATELET # BLD AUTO: 195 X10(3)/MCL (ref 130–400)
PMV BLD AUTO: 10.5 FL (ref 7.4–10.4)
POTASSIUM SERPL-SCNC: 3.8 MMOL/L (ref 3.5–5.1)
PROT SERPL-MCNC: 7.3 GM/DL (ref 6.4–8.3)
RBC # BLD AUTO: 4.38 X10(6)/MCL (ref 4.2–5.4)
SODIUM SERPL-SCNC: 139 MMOL/L (ref 136–145)
WBC # SPEC AUTO: 3.06 X10(3)/MCL (ref 4.5–11.5)

## 2023-05-16 PROCEDURE — 99213 OFFICE O/P EST LOW 20 MIN: CPT | Mod: PBBFAC | Performed by: INTERNAL MEDICINE

## 2023-05-16 PROCEDURE — 99213 OFFICE O/P EST LOW 20 MIN: CPT | Mod: S$PBB,,, | Performed by: INTERNAL MEDICINE

## 2023-05-16 PROCEDURE — 36415 COLL VENOUS BLD VENIPUNCTURE: CPT

## 2023-05-16 PROCEDURE — 99999 PR PBB SHADOW E&M-EST. PATIENT-LVL III: CPT | Mod: PBBFAC,,, | Performed by: INTERNAL MEDICINE

## 2023-05-16 PROCEDURE — 85025 COMPLETE CBC W/AUTO DIFF WBC: CPT

## 2023-05-16 PROCEDURE — 99213 PR OFFICE/OUTPT VISIT, EST, LEVL III, 20-29 MIN: ICD-10-PCS | Mod: S$PBB,,, | Performed by: INTERNAL MEDICINE

## 2023-05-16 PROCEDURE — 80053 COMPREHEN METABOLIC PANEL: CPT

## 2023-05-16 PROCEDURE — 99999 PR PBB SHADOW E&M-EST. PATIENT-LVL III: ICD-10-PCS | Mod: PBBFAC,,, | Performed by: INTERNAL MEDICINE

## 2023-05-22 ENCOUNTER — TELEPHONE (OUTPATIENT)
Dept: HEMATOLOGY/ONCOLOGY | Facility: CLINIC | Age: 53
End: 2023-05-22
Payer: MEDICARE

## 2023-05-22 NOTE — TELEPHONE ENCOUNTER
Patient left message asking to be admitted for chronic UTI's. I was unable to speak with her directly, but left detailed message that we are unable to admit for this diagnosis. She is currently under Dr. Omalley's care to monitor blood counts, which have been stable. She was referred to [facility]. I left their contact information with patient to check status of referral.

## 2023-06-11 ENCOUNTER — HOSPITAL ENCOUNTER (EMERGENCY)
Facility: HOSPITAL | Age: 53
Discharge: HOME | End: 2023-06-11
Attending: INTERNAL MEDICINE
Payer: MEDICARE

## 2023-06-11 VITALS
BODY MASS INDEX: 21.43 KG/M2 | SYSTOLIC BLOOD PRESSURE: 135 MMHG | DIASTOLIC BLOOD PRESSURE: 78 MMHG | HEART RATE: 78 BPM | OXYGEN SATURATION: 99 % | TEMPERATURE: 98 F | RESPIRATION RATE: 18 BRPM | WEIGHT: 128.81 LBS

## 2023-06-11 DIAGNOSIS — R30.0 DYSURIA: Primary | ICD-10-CM

## 2023-06-11 DIAGNOSIS — R31.9 HEMATURIA, UNSPECIFIED TYPE: ICD-10-CM

## 2023-06-11 DIAGNOSIS — E87.6 HYPOKALEMIA: ICD-10-CM

## 2023-06-11 LAB
ALBUMIN SERPL-MCNC: 4.2 G/DL (ref 3.5–5)
ALBUMIN/GLOB SERPL: 1.3 RATIO (ref 1.1–2)
ALP SERPL-CCNC: 54 UNIT/L (ref 40–150)
ALT SERPL-CCNC: 20 UNIT/L (ref 0–55)
APPEARANCE UR: CLEAR
APTT PPP: 28 SECONDS (ref 23.2–33.7)
AST SERPL-CCNC: 23 UNIT/L (ref 5–34)
BACTERIA #/AREA URNS AUTO: ABNORMAL /HPF
BASOPHILS # BLD AUTO: 0.03 X10(3)/MCL
BASOPHILS NFR BLD AUTO: 0.9 %
BILIRUB UR QL STRIP.AUTO: NEGATIVE MG/DL
BILIRUBIN DIRECT+TOT PNL SERPL-MCNC: 0.6 MG/DL
BUN SERPL-MCNC: 9 MG/DL (ref 9.8–20.1)
CALCIUM SERPL-MCNC: 9.5 MG/DL (ref 8.4–10.2)
CHLORIDE SERPL-SCNC: 105 MMOL/L (ref 98–107)
CO2 SERPL-SCNC: 25 MMOL/L (ref 22–29)
COLOR UR: YELLOW
CREAT SERPL-MCNC: 1.02 MG/DL (ref 0.55–1.02)
EOSINOPHIL # BLD AUTO: 0.03 X10(3)/MCL (ref 0–0.9)
EOSINOPHIL NFR BLD AUTO: 0.9 %
ERYTHROCYTE [DISTWIDTH] IN BLOOD BY AUTOMATED COUNT: 12.7 % (ref 11.5–17)
GFR SERPLBLD CREATININE-BSD FMLA CKD-EPI: >60 MLS/MIN/1.73/M2
GLOBULIN SER-MCNC: 3.2 GM/DL (ref 2.4–3.5)
GLUCOSE SERPL-MCNC: 113 MG/DL (ref 74–100)
GLUCOSE UR QL STRIP.AUTO: NEGATIVE MG/DL
HCT VFR BLD AUTO: 38.1 % (ref 37–47)
HGB BLD-MCNC: 11.9 G/DL (ref 12–16)
IMM GRANULOCYTES # BLD AUTO: 0 X10(3)/MCL (ref 0–0.04)
IMM GRANULOCYTES NFR BLD AUTO: 0 %
INR BLD: 1.13 (ref 0–1.3)
KETONES UR QL STRIP.AUTO: NEGATIVE MG/DL
LEUKOCYTE ESTERASE UR QL STRIP.AUTO: ABNORMAL UNIT/L
LYMPHOCYTES # BLD AUTO: 1.62 X10(3)/MCL (ref 0.6–4.6)
LYMPHOCYTES NFR BLD AUTO: 49.8 %
MCH RBC QN AUTO: 29.4 PG (ref 27–31)
MCHC RBC AUTO-ENTMCNC: 31.2 G/DL (ref 33–36)
MCV RBC AUTO: 94.1 FL (ref 80–94)
MONOCYTES # BLD AUTO: 0.32 X10(3)/MCL (ref 0.1–1.3)
MONOCYTES NFR BLD AUTO: 9.8 %
NEUTROPHILS # BLD AUTO: 1.25 X10(3)/MCL (ref 2.1–9.2)
NEUTROPHILS NFR BLD AUTO: 38.6 %
NITRITE UR QL STRIP.AUTO: NEGATIVE
PH UR STRIP.AUTO: 7 [PH]
PLATELET # BLD AUTO: 179 X10(3)/MCL (ref 130–400)
PMV BLD AUTO: 11.1 FL (ref 7.4–10.4)
POTASSIUM SERPL-SCNC: 3.3 MMOL/L (ref 3.5–5.1)
PROT SERPL-MCNC: 7.4 GM/DL (ref 6.4–8.3)
PROT UR QL STRIP.AUTO: NEGATIVE MG/DL
PROTHROMBIN TIME: 14.8 SECONDS (ref 12.5–14.5)
RBC # BLD AUTO: 4.05 X10(6)/MCL (ref 4.2–5.4)
RBC #/AREA URNS AUTO: ABNORMAL /HPF
RBC UR QL AUTO: ABNORMAL UNIT/L
SODIUM SERPL-SCNC: 138 MMOL/L (ref 136–145)
SP GR UR STRIP.AUTO: 1.01
SQUAMOUS #/AREA URNS AUTO: ABNORMAL /HPF
UROBILINOGEN UR STRIP-ACNC: 0.2 MG/DL
WBC # SPEC AUTO: 3.25 X10(3)/MCL (ref 4.5–11.5)
WBC #/AREA URNS AUTO: ABNORMAL /HPF

## 2023-06-11 PROCEDURE — 25000003 PHARM REV CODE 250: Performed by: NURSE PRACTITIONER

## 2023-06-11 PROCEDURE — 81001 URINALYSIS AUTO W/SCOPE: CPT | Performed by: NURSE PRACTITIONER

## 2023-06-11 PROCEDURE — 85730 THROMBOPLASTIN TIME PARTIAL: CPT | Performed by: NURSE PRACTITIONER

## 2023-06-11 PROCEDURE — 85025 COMPLETE CBC W/AUTO DIFF WBC: CPT | Performed by: NURSE PRACTITIONER

## 2023-06-11 PROCEDURE — 99284 EMERGENCY DEPT VISIT MOD MDM: CPT

## 2023-06-11 PROCEDURE — 85610 PROTHROMBIN TIME: CPT | Performed by: NURSE PRACTITIONER

## 2023-06-11 PROCEDURE — 87088 URINE BACTERIA CULTURE: CPT | Performed by: NURSE PRACTITIONER

## 2023-06-11 PROCEDURE — 80053 COMPREHEN METABOLIC PANEL: CPT | Performed by: NURSE PRACTITIONER

## 2023-06-11 RX ORDER — PHENAZOPYRIDINE HYDROCHLORIDE 200 MG/1
200 TABLET, FILM COATED ORAL 3 TIMES DAILY
Qty: 6 TABLET | Refills: 0 | Status: SHIPPED | OUTPATIENT
Start: 2023-06-11 | End: 2023-06-21

## 2023-06-11 RX ORDER — POTASSIUM CHLORIDE 750 MG/1
10 TABLET, EXTENDED RELEASE ORAL 3 TIMES DAILY
Qty: 9 TABLET | Refills: 0 | Status: SHIPPED | OUTPATIENT
Start: 2023-06-11 | End: 2023-06-14

## 2023-06-11 RX ADMIN — POTASSIUM BICARBONATE 40 MEQ: 782 TABLET, EFFERVESCENT ORAL at 09:06

## 2023-06-12 NOTE — ED PROVIDER NOTES
Encounter Date: 6/11/2023       History     Chief Complaint   Patient presents with    Vaginal Bleeding     States began 2 days ago with vaginal bleeding. Seen OB/GYN and urologist multiple times in the last few months for recurrent UTIs and vaginal dryness.      Patient is a 52-year-old female who presents emerged department complaints of vaginal bleeding for 2 days since using a topical lubricant due to vaginal dryness.  Patient reports complicated history of ongoing UTI in which she took Macrobid for the month of December, January, February, march and states she was still having complications so she was finally able to follow-up with her regular doctor rosalva Baez she reports who has been working to get resolution to her issues.  She states they recommended topical lubricant due to vaginal dryness since the complicated issues recently.  She states she was told it could cause vaginal bleeding which is why she came in today.  She does report dysuria.  She denies fevers chills.  She denies abdominal pain or back pain.  She denies any other complaints or associated symptoms at this time.    Review of patient's allergies indicates:   Allergen Reactions    Ketorolac      Other reaction(s): SOB, Stops breathing    Prednisone Shortness Of Breath     Other reaction(s): SOB    Amoxicillin-pot clavulanate      Other reaction(s): Hives, RASH    Azithromycin      Other reaction(s): RASH    Ciprofloxacin      Other reaction(s): Hives, RASH    Clindamycin      Other reaction(s): Rash    Diphenoxylate-atropine      Other reaction(s): Hives, RASH    Hydrocodone-acetaminophen      Other reaction(s): Hives, hives, SWELLING, tongue swelling    Iodine      Other reaction(s): Difficulty breathing, Hives, SOB, Swelling    Penicillin      Other reaction(s): Hives, RASH    Shellfish containing products      Other reaction(s): Hives, RASH    Sulfamethoxazole-trimethoprim      Other reaction(s): Hives, RASH     Past Medical History:    Diagnosis Date    Chronic ITP (idiopathic thrombocytopenia)     Crohn's disease     GERD (gastroesophageal reflux disease)     Hypertension     Leukopenia     Psychosomatic disorder     Severe anxiety      Past Surgical History:   Procedure Laterality Date    BONE MARROW BIOPSY  06/13/2016    DILATION AND CURETTAGE OF UTERUS  10/31/2018    PARTIAL HYSTERECTOMY       Family History   Problem Relation Age of Onset    Diabetes Mother     Hypertension Mother     Heart attack Mother     Heart disease Mother     Diabetes Father     Brain cancer Father     ALYSE disease Father     Heart disease Sister     Diabetes Brother      Social History     Tobacco Use    Smoking status: Never    Smokeless tobacco: Never   Substance Use Topics    Alcohol use: Never    Drug use: Never     Review of Systems   Constitutional:  Negative for activity change, appetite change and fever.   HENT:  Negative for congestion, dental problem, sore throat, trouble swallowing and voice change.    Eyes:  Negative for discharge.   Respiratory:  Negative for shortness of breath.    Cardiovascular:  Negative for chest pain.   Gastrointestinal:  Negative for abdominal distention, abdominal pain and nausea.   Endocrine: Negative for cold intolerance and polydipsia.   Genitourinary:  Positive for dysuria, hematuria and vaginal bleeding.   Musculoskeletal:  Negative for back pain, neck pain and neck stiffness.   Skin:  Negative for rash.   Neurological:  Negative for dizziness, facial asymmetry and weakness.   Hematological:  Does not bruise/bleed easily.   Psychiatric/Behavioral:  Negative for agitation and behavioral problems.    All other systems reviewed and are negative.    Physical Exam     Initial Vitals [06/11/23 1932]   BP Pulse Resp Temp SpO2   (!) 165/79 83 18 98.2 °F (36.8 °C) 100 %      MAP       --         Physical Exam    Nursing note and vitals reviewed.  Constitutional: Vital signs are normal. She appears well-developed and well-nourished.   Non-toxic appearance. She does not have a sickly appearance.   HENT:   Head: Normocephalic and atraumatic.   Eyes: Conjunctivae, EOM and lids are normal. Lids are everted and swept, no foreign bodies found.   Neck: Trachea normal and phonation normal. Neck supple. No thyroid mass and no thyromegaly present.   Normal range of motion.   Full passive range of motion without pain.     Cardiovascular:  Normal rate, regular rhythm, S1 normal, S2 normal, normal heart sounds, intact distal pulses and normal pulses.           Pulmonary/Chest: Breath sounds normal. No respiratory distress.   Abdominal: Abdomen is soft. She exhibits no distension.   Genitourinary:    Vagina normal.      Genitourinary Comments: Vaginal canal has no trace or gross blood.  External vagina has no signs of bleeding as well.     Musculoskeletal:      Cervical back: Full passive range of motion without pain, normal range of motion and neck supple.     Lymphadenopathy:     She has no cervical adenopathy.   Neurological: She is alert and oriented to person, place, and time. She has normal strength. GCS score is 15. GCS eye subscore is 4. GCS verbal subscore is 5. GCS motor subscore is 6.   Skin: Skin is warm, dry and intact. Capillary refill takes less than 2 seconds.   Psychiatric: She has a normal mood and affect. Her speech is normal and behavior is normal. Judgment normal. Cognition and memory are normal.       ED Course   Procedures  Labs Reviewed   URINALYSIS, REFLEX TO URINE CULTURE - Abnormal; Notable for the following components:       Result Value    Blood, UA 2+ (*)     Leukocyte Esterase, UA 1+ (*)     All other components within normal limits   COMPREHENSIVE METABOLIC PANEL - Abnormal; Notable for the following components:    Potassium Level 3.3 (*)     Glucose Level 113 (*)     Blood Urea Nitrogen 9.0 (*)     All other components within normal limits   PROTIME-INR - Abnormal; Notable for the following components:    PT 14.8 (*)     All  other components within normal limits   CBC WITH DIFFERENTIAL - Abnormal; Notable for the following components:    WBC 3.25 (*)     RBC 4.05 (*)     Hgb 11.9 (*)     MCV 94.1 (*)     MCHC 31.2 (*)     MPV 11.1 (*)     Neut # 1.25 (*)     All other components within normal limits   URINALYSIS, MICROSCOPIC - Abnormal; Notable for the following components:    RBC, UA 6-10 (*)     WBC, UA 6-10 (*)     Squamous Epithelial Cells, UA Few (*)     All other components within normal limits   APTT - Normal   CULTURE, URINE   CBC W/ AUTO DIFFERENTIAL    Narrative:     The following orders were created for panel order CBC auto differential.  Procedure                               Abnormality         Status                     ---------                               -----------         ------                     CBC with Differential[[id number]]        Abnormal            Final result                 Please view results for these tests on the individual orders.          Imaging Results    None          Medications   potassium bicarbonate disintegrating tablet 40 mEq (has no administration in time range)                 ED Course as of 06/11/23 2120   Sun Jun 11, 2023 2025 Vaginal exam was done at this time.  There is no blood in the vaginal canal.  External vagina see no dried or scant blood either.  She states the blood was only when she wiped.  Assuming this point that it was probably from the urethra as there is no signs of trace or gross blood in the vagina.  Patient tolerated vaginal exam well.  Chaperone was present with rebecca Dickinson the emergency room technician. [SL]   2107 6-10 white blood cells and microscopic did not reflex culture so I did call and ask lab to add urine culture due to her complex history of UTI apparently since she has been having since December. [SL]   2116 Discussed that I recommend not starting the patient on antibiotics at this time due to her complicated history of long Macrobid use and the fact  that she is already established with a primary care physician in his working to investigate further why she is having some any complications.  She further states she actually talked to mom she was here and they are going to follow up with her this week and the physician that she talked to also recommended not starting any additional antibiotics at this time.  Discussed potassium replacement for hypokalemia here.  Discussed Pyridium as needed for vaginal discomfort with urination. [SL]      ED Course User Index  [SL] RAHEL Welch          Medical Decision Making  52-year-old female presents emerged department complaints of vaginal bleeding since using topical lubricant for vaginal dryness.    Problems Addressed:  Dysuria: acute illness or injury     Details: Discussed follow-up with her PCP.  Discussed Pyridium for symptoms.  Discussed increasing oral hydration with water for the next 12:48 p.m..  Discussed strict ED return precautions.  Patient aware plan of care and had no questions or concerns prior to discharge.  Hematuria, unspecified type: acute illness or injury     Details: Discussed increasing water intake.  Discussed follow-up PCP who has been working with her due to a complicated history of UTI like symptoms since December of last year.  Discussed strict ED return precautions.  Hypokalemia: acute illness or injury     Details: Oral potassium given here.  Discharged home with additional potassium to replenish loss for the next 3 days.  Discussed PCP follow-up.  Discussed strict ED return precautions.    Amount and/or Complexity of Data Reviewed  External Data Reviewed: labs, radiology and notes.  Labs: ordered. Decision-making details documented in ED Course.            Clinical Impression:   Final diagnoses:  [R30.0] Dysuria (Primary)  [R31.9] Hematuria, unspecified type  [E87.6] Hypokalemia        ED Disposition Condition    Discharge Stable          ED Prescriptions       Medication Sig Dispense  Start Date End Date Auth. Provider    phenazopyridine (PYRIDIUM) 200 MG tablet Take 1 tablet (200 mg total) by mouth 3 (three) times daily. for 10 days 6 tablet 6/11/2023 6/21/2023 RAHEL Welch    potassium chloride (KLOR-CON) 10 MEQ TbSR Take 1 tablet (10 mEq total) by mouth 3 (three) times daily. for 3 days 9 tablet 6/11/2023 6/14/2023 RAHEL Welch          Follow-up Information    None          RAHEL Welch  06/11/23 2123     No

## 2023-06-13 DIAGNOSIS — D69.3 CHRONIC ITP (IDIOPATHIC THROMBOCYTOPENIC PURPURA): ICD-10-CM

## 2023-06-13 DIAGNOSIS — N39.0 URINARY TRACT INFECTION WITHOUT HEMATURIA, SITE UNSPECIFIED: Primary | ICD-10-CM

## 2023-06-14 LAB — BACTERIA UR CULT: NORMAL

## 2023-06-30 ENCOUNTER — HOSPITAL ENCOUNTER (EMERGENCY)
Facility: HOSPITAL | Age: 53
Discharge: HOME | End: 2023-06-30
Attending: FAMILY MEDICINE
Payer: MEDICARE

## 2023-06-30 VITALS
DIASTOLIC BLOOD PRESSURE: 94 MMHG | HEART RATE: 61 BPM | HEIGHT: 67 IN | WEIGHT: 126.81 LBS | BODY MASS INDEX: 19.9 KG/M2 | RESPIRATION RATE: 18 BRPM | OXYGEN SATURATION: 100 % | SYSTOLIC BLOOD PRESSURE: 179 MMHG | TEMPERATURE: 99 F

## 2023-06-30 DIAGNOSIS — K12.0 APHTHOUS ULCER: Primary | ICD-10-CM

## 2023-06-30 DIAGNOSIS — B37.0 ORAL CANDIDA: ICD-10-CM

## 2023-06-30 DIAGNOSIS — K12.0 APHTHOUS STOMATITIS: ICD-10-CM

## 2023-06-30 PROCEDURE — 99283 EMERGENCY DEPT VISIT LOW MDM: CPT

## 2023-06-30 PROCEDURE — 25000003 PHARM REV CODE 250: Performed by: FAMILY MEDICINE

## 2023-06-30 RX ORDER — LIDOCAINE HYDROCHLORIDE 20 MG/ML
10 SOLUTION OROPHARYNGEAL
Status: COMPLETED | OUTPATIENT
Start: 2023-06-30 | End: 2023-06-30

## 2023-06-30 RX ORDER — MAG HYDROX/ALUMINUM HYD/SIMETH 200-200-20
30 SUSPENSION, ORAL (FINAL DOSE FORM) ORAL
Status: COMPLETED | OUTPATIENT
Start: 2023-06-30 | End: 2023-06-30

## 2023-06-30 RX ORDER — NYSTATIN 100000 [USP'U]/ML
5 SUSPENSION ORAL 4 TIMES DAILY PRN
Qty: 200 ML | Refills: 0 | Status: SHIPPED | OUTPATIENT
Start: 2023-06-30 | End: 2023-07-10

## 2023-06-30 RX ORDER — NYSTATIN 100000 [USP'U]/ML
5 SUSPENSION ORAL
Status: COMPLETED | OUTPATIENT
Start: 2023-06-30 | End: 2023-06-30

## 2023-06-30 RX ADMIN — NYSTATIN 500000 UNITS: 100000 SUSPENSION ORAL at 03:06

## 2023-06-30 RX ADMIN — LIDOCAINE HYDROCHLORIDE 10 ML: 20 SOLUTION ORAL at 03:06

## 2023-06-30 RX ADMIN — ALUMINUM HYDROXIDE, MAGNESIUM HYDROXIDE, AND SIMETHICONE 30 ML: 200; 200; 20 SUSPENSION ORAL at 03:06

## 2023-06-30 NOTE — ED PROVIDER NOTES
Encounter Date: 6/30/2023       History     Chief Complaint   Patient presents with    Mouth Lesions     Pt states that for the past couple of days she has been having ulcers in her mouth. States that she seen her ENT and they called her out some mouthwash to pharmacy but unable to fill it. States that ENT told her to come to the ER tonight if the ulcers got worse and patient states tonight the ulcers got more painful and swollen     51 y/o female presents tot he ER with complaints of mouth ulcers for the past few days.  Patient reports contacting her ENT yesterday and they called out a mouthwash.  Patient reports the pharmacy did not understand the prescription, and therefore the patient was unable to get the prescription filled.  She reports discomfort with swallowing.  History of GERD.  She reports that her ENT identified some of the ulcers further in her throat via flexible laryngoscopy.  Patient denies chest pain or dyspnea; denies fever or chills.    The history is provided by the patient.   Review of patient's allergies indicates:   Allergen Reactions    Ketorolac      Other reaction(s): SOB, Stops breathing    Prednisone Shortness Of Breath     Other reaction(s): SOB    Amoxicillin-pot clavulanate      Other reaction(s): Hives, RASH    Azithromycin      Other reaction(s): RASH    Ciprofloxacin      Other reaction(s): Hives, RASH    Clindamycin      Other reaction(s): Rash    Diphenoxylate-atropine      Other reaction(s): Hives, RASH    Hydrocodone-acetaminophen      Other reaction(s): Hives, hives, SWELLING, tongue swelling    Iodine      Other reaction(s): Difficulty breathing, Hives, SOB, Swelling    Penicillin      Other reaction(s): Hives, RASH    Shellfish containing products      Other reaction(s): Hives, RASH    Sulfamethoxazole-trimethoprim      Other reaction(s): Hives, RASH     Past Medical History:   Diagnosis Date    Chronic ITP (idiopathic thrombocytopenia)     Crohn's disease     GERD  (gastroesophageal reflux disease)     Hypertension     Leukopenia     Psychosomatic disorder     Severe anxiety      Past Surgical History:   Procedure Laterality Date    BONE MARROW BIOPSY  06/13/2016    DILATION AND CURETTAGE OF UTERUS  10/31/2018    PARTIAL HYSTERECTOMY       Family History   Problem Relation Age of Onset    Diabetes Mother     Hypertension Mother     Heart attack Mother     Heart disease Mother     Diabetes Father     Brain cancer Father     ALYSE disease Father     Heart disease Sister     Diabetes Brother      Social History     Tobacco Use    Smoking status: Never    Smokeless tobacco: Never   Substance Use Topics    Alcohol use: Never    Drug use: Never     Review of Systems   Constitutional:  Negative for chills, fatigue and fever.   HENT:  Negative for ear pain, rhinorrhea and sore throat.    Eyes:  Negative for photophobia and pain.   Respiratory:  Negative for cough, shortness of breath and wheezing.    Cardiovascular:  Negative for chest pain.   Gastrointestinal:  Negative for abdominal pain, diarrhea, nausea and vomiting.   Genitourinary:  Negative for dysuria.   Neurological:  Negative for dizziness, weakness and headaches.   All other systems reviewed and are negative.    Physical Exam     Initial Vitals [06/30/23 0244]   BP Pulse Resp Temp SpO2   (!) 176/97 64 18 98.5 °F (36.9 °C) 100 %      MAP       --         Physical Exam    Nursing note and vitals reviewed.  Constitutional: She appears well-developed and well-nourished. No distress.   HENT:   Head: Normocephalic and atraumatic.   Right Ear: External ear normal.   Left Ear: External ear normal.   Nose: Nose normal.   Right lateral tongue - white plague on tongue   Eyes: Conjunctivae and EOM are normal. Pupils are equal, round, and reactive to light.   Neck: Neck supple.   Normal range of motion.  Cardiovascular:  Normal rate, regular rhythm and normal heart sounds.           Pulmonary/Chest: No respiratory distress. She has no  wheezes. She has no rhonchi. She has no rales.   Abdominal: Abdomen is soft. Bowel sounds are normal. She exhibits no distension. There is no abdominal tenderness. There is no rebound and no guarding.   Musculoskeletal:         General: Normal range of motion.      Cervical back: Normal range of motion and neck supple.     Neurological: She is alert and oriented to person, place, and time.   Skin: Skin is warm and dry. Capillary refill takes less than 2 seconds. No erythema.   Psychiatric: She has a normal mood and affect. Her behavior is normal. Judgment and thought content normal.       ED Course   Procedures  Labs Reviewed - No data to display       Imaging Results    None          Medications   nystatin 100,000 unit/mL suspension 500,000 Units (has no administration in time range)   aluminum-magnesium hydroxide-simethicone 200-200-20 mg/5 mL suspension 30 mL (30 mLs Oral Given 6/30/23 0315)     And   LIDOcaine HCl 2% oral solution 10 mL (10 mLs Oral Given 6/30/23 0315)     Medical Decision Making:   Initial Assessment:   53 y/o female presenting to the ER with complaints of mouth pain and sore throat.  She reports she had a mouthwash called out, but unable to get the medication filled.  On physical exam, while plaque on right lateral tongue.  No other mouth lesions appreciated.  Patient reports discomfort with swallowing as well, and told she had inflammation per her ENT when direct flexible laryngoscopy was performed.  DDX includes aphthous ulcers vs candida stomatitis.  Will treat with nystatin and GI cocktail here in the ED, and will send with prescriptions for both.                        Clinical Impression:   Final diagnoses:  [K12.0] Aphthous ulcer (Primary)  [K12.0] Aphthous stomatitis  [B37.0] Oral candida        ED Disposition Condition    Discharge Stable          ED Prescriptions       Medication Sig Dispense Start Date End Date Auth. Provider    (Magic mouthwash) 1:1:1 diphenhydrAMINE(Benadryl)  12.5mg/5ml liq, aluminum & magnesium hydroxide-simethicone (Maalox), LIDOcaine viscous 2% Swish and spit 15 mLs every 4 (four) hours as needed. for mouth sores 480 mL 6/30/2023 7/10/2023 Bucky Vo MD    nystatin (MYCOSTATIN) 100,000 unit/mL suspension Take 5 mLs (500,000 Units total) by mouth 4 (four) times daily as needed (Mouth pain). 200 mL 6/30/2023 7/10/2023 Bucky Vo MD          Follow-up Information       Follow up With Specialties Details Why Contact Info    Mike Tuttle MD Family Medicine   [address]   [facility] [address]  [phone]      [facility] - Emergency Dept Emergency Medicine  As needed, If symptoms worsen [address]  [facility] [address]  [phone]             Bucky Vo MD  06/30/23 8098

## 2023-07-09 ENCOUNTER — HOSPITAL ENCOUNTER (EMERGENCY)
Facility: HOSPITAL | Age: 53
Discharge: HOME | End: 2023-07-09
Attending: EMERGENCY MEDICINE
Payer: MEDICARE

## 2023-07-09 VITALS
SYSTOLIC BLOOD PRESSURE: 160 MMHG | RESPIRATION RATE: 18 BRPM | HEIGHT: 66 IN | TEMPERATURE: 98 F | OXYGEN SATURATION: 100 % | BODY MASS INDEX: 20.09 KG/M2 | WEIGHT: 125 LBS | HEART RATE: 63 BPM | DIASTOLIC BLOOD PRESSURE: 73 MMHG

## 2023-07-09 DIAGNOSIS — R07.9 CHEST PAIN: ICD-10-CM

## 2023-07-09 DIAGNOSIS — F41.9 ANXIETY: ICD-10-CM

## 2023-07-09 DIAGNOSIS — R07.89 CHEST WALL PAIN: Primary | ICD-10-CM

## 2023-07-09 LAB — TROPONIN I SERPL-MCNC: <0.01 NG/ML (ref 0–0.04)

## 2023-07-09 PROCEDURE — 99284 EMERGENCY DEPT VISIT MOD MDM: CPT

## 2023-07-09 PROCEDURE — 93010 ELECTROCARDIOGRAM REPORT: CPT | Mod: ,,, | Performed by: INTERNAL MEDICINE

## 2023-07-09 PROCEDURE — 93010 EKG 12-LEAD: ICD-10-PCS | Mod: ,,, | Performed by: INTERNAL MEDICINE

## 2023-07-09 PROCEDURE — 84484 ASSAY OF TROPONIN QUANT: CPT | Performed by: EMERGENCY MEDICINE

## 2023-07-09 NOTE — DISCHARGE INSTRUCTIONS
Follow-up with your regular physician and your specialist in [place]    Continue your current medications.    Take your Maalox once a day if that is the most you can tolerated.    If you develop emergency problems do not hesitate to return

## 2023-07-09 NOTE — ED PROVIDER NOTES
Encounter Date: 2023       History     Chief Complaint   Patient presents with    Chest Pain     Pt c/o mid chest pain with sob since Friday, states recently started taking Estrogen and thinks it may be a factor, also report hx of anxiety of disorder.     Ms. Holland presents emergency department with a sharp chest pain that has been in the lower center of her chest since Friday.  It has been off and on yesterday she took both Tylenol and ibuprofen and it did get better some.  But today she decided to come into get checked.      She said she is had this kind of chest pain before in her doctor's center all the way to use into get checked and they determined that this was something to do with hormone issues.  They put her on estrogen and everything has been better until this Friday.      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  She is no longer suffering with the ITP symptoms but she still gets regular checkups from her oncologist.  She has a history gastroesophageal reflux disease.  She has a history of severe anxiety.  She has a history of this chest pain but she tells me they have never found it to be anything bad with her heart.  She does not smoke use alcohol nor drugs.  She can not take COVID pneumonia flu shots due to her reactions to them.  She has had a tetanus in the last 5 years.      She is had 1 ovary removed for abnormal cells but it was not cancer.  She is had 3 bone marrow transplant the last 2016 that apparently was successful.  She is a  2 para 2.  She was on Depo shots to control her bleeding when her platelets were low.  And she says that is part of the issue she had with the hormones.  She has not bled since the hormone treatment the Depo shots were stopped recently.      She sees Dr. Omalley oncologist in [place].  Hematologist oncologist.  She sees Dr. Tuttle  a physician in [facility]  She has multiple allergies that are reviewed in  the chart.  Her medications are reviewed in the chart.  On a single blood pressure medication    Review of patient's allergies indicates:   Allergen Reactions    Ketorolac      Other reaction(s): SOB, Stops breathing    Prednisone Shortness Of Breath     Other reaction(s): SOB    Amoxicillin-pot clavulanate      Other reaction(s): Hives, RASH    Azithromycin      Other reaction(s): RASH    Ciprofloxacin      Other reaction(s): Hives, RASH    Clindamycin      Other reaction(s): Rash    Diphenoxylate-atropine      Other reaction(s): Hives, RASH    Hydrocodone-acetaminophen      Other reaction(s): Hives, hives, SWELLING, tongue swelling    Iodine      Other reaction(s): Difficulty breathing, Hives, SOB, Swelling    Penicillin      Other reaction(s): Hives, RASH    Shellfish containing products      Other reaction(s): Hives, RASH    Sulfamethoxazole-trimethoprim      Other reaction(s): Hives, RASH     Past Medical History:   Diagnosis Date    Chronic ITP (idiopathic thrombocytopenia)     Crohn's disease     GERD (gastroesophageal reflux disease)     Hypertension     Leukopenia     Psychosomatic disorder     Severe anxiety      Past Surgical History:   Procedure Laterality Date    BONE MARROW BIOPSY  06/13/2016    DILATION AND CURETTAGE OF UTERUS  10/31/2018    PARTIAL HYSTERECTOMY       Family History   Problem Relation Age of Onset    Diabetes Mother     Hypertension Mother     Heart attack Mother     Heart disease Mother     Diabetes Father     Brain cancer Father     ALYSE disease Father     Heart disease Sister     Diabetes Brother      Social History     Tobacco Use    Smoking status: Never    Smokeless tobacco: Never   Substance Use Topics    Alcohol use: Never    Drug use: Never     Review of Systems   Constitutional:  Negative for activity change and appetite change.   HENT: Negative.     Eyes: Negative.    Respiratory:  Negative for chest tightness, shortness of breath and wheezing.         No pleuritic nature to  the pain   Cardiovascular:  Positive for chest pain (Sharp lower midsternal chest pain off and on since Friday today would be the 3rd day).   Gastrointestinal:  Negative for abdominal pain, nausea and vomiting.   Endocrine: Negative.    Genitourinary:  Negative for difficulty urinating, dysuria and vaginal bleeding.   Musculoskeletal: Negative.    Skin: Negative.    Allergic/Immunologic: Negative.    Neurological: Negative.    Hematological: Negative.    Psychiatric/Behavioral:  The patient is nervous/anxious.    All other systems reviewed and are negative.    Physical Exam     Initial Vitals   BP Pulse Resp Temp SpO2   07/09/23 0842 07/09/23 0842 07/09/23 0843 07/09/23 0854 07/09/23 0842   (!) 171/90 75 18 97.7 °F (36.5 °C) 100 %      MAP       --                Physical Exam    Nursing note and vitals reviewed.  Constitutional: She appears well-developed and well-nourished.   Very slender female quite anxious awake oriented very animated   HENT:   Head: Normocephalic and atraumatic.   Right Ear: Tympanic membrane and external ear normal.   Left Ear: Tympanic membrane and external ear normal.   Nose: Nose normal.   Mouth/Throat: Oropharynx is clear and moist and mucous membranes are normal.   Eyes: EOM are normal. Pupils are equal, round, and reactive to light.   Neck: Neck supple. No thyromegaly present. No tracheal deviation present. No JVD present.   Normal range of motion.  Cardiovascular:  Normal rate, regular rhythm and normal heart sounds.     Exam reveals no gallop and no friction rub.       No murmur heard.  Pulmonary/Chest: Breath sounds normal. No stridor. No respiratory distress. She has no wheezes. She has no rhonchi. She has no rales. She exhibits no tenderness.   I can not reproduce her chest pain by pressing on sternum or either sides of her costal cartilage junction.  She does have minimum direct xiphoid process tenderness in epigastric tenderness minimum   Abdominal: Abdomen is soft. Bowel sounds  are normal. She exhibits no distension and no mass. There is no abdominal tenderness.   Genitourinary:    Genitourinary Comments: Patient has no CVA tenderness on either side     Musculoskeletal:         General: No tenderness or edema. Normal range of motion.      Cervical back: Normal range of motion and neck supple.      Comments: Lower extremities have no swelling in the calves there is no palpable cords no evidence of any peripheral edema like congestive heart failure or  DVT     Lymphadenopathy:     She has no cervical adenopathy.   Neurological: She is alert and oriented to person, place, and time. She has normal strength and normal reflexes. No cranial nerve deficit. GCS score is 15. GCS eye subscore is 4. GCS verbal subscore is 5. GCS motor subscore is 6.   Skin: Skin is warm and dry. Capillary refill takes less than 2 seconds. No rash noted.   Psychiatric:   Her speech is rapid she is fully awake and oriented she is quite anxious       ED Course   Procedures  Labs Reviewed   TROPONIN I - Normal     EKG Readings: (Independently Interpreted)   Previous EKG: Compared with most recent EKG Rhythm: Normal Sinus Rhythm. Ectopy: No Ectopy. Conduction: Normal. ST Segments: Normal ST Segments. T Waves: Normal. Axis: Normal. Clinical Impression: Normal Sinus Rhythm   EKG is performed at 8:42 a.m. 9 July normal sinus rhythm normal ECG 68 beats per minute no old EKGs to compare with     Imaging Results    None          Medications - No data to display  Medical Decision Making:   Initial Assessment:   Chest pain sharp intermittent since Friday  Very slender female quite anxious pressured speech awake and oriented  Heart is regular rate and rhythm non tachycardic.  Without murmur gallop or rub  Lungs are clear to auscultation and percussion.  Patient does not have any chest wall tenderness to palpation  Patient has minimum direct xiphoid process mid epigastric tenderness minimum good bowel sounds all quadrants quite flat  abdomen  Neurological seems appropriate back is nontender there is no tachypnea there is no respiratory issues saturations noted to be 100% with a normal respiratory rate  Differential Diagnosis:   Chest wall pain, angina type chest pain.  Severe anxiety noncardiac chest pain.  Gastroesophageal reflux disease.  Esophageal spasm.  Pulmonary embolus patient does not have any evidence of tachypnea or tachycardia.  Or hypoxemia  To suggest pulmonary embolus   Independently Interpreted Test(s):   I have ordered and independently interpreted EKG Reading(s) - see summary below       <> Summary of EKG Reading(s): Normal sinus rhythm normal EKG heart rate 68  Clinical Tests:   Lab Tests: Ordered  ED Management:  Treatment is evaluation she is maintained on cardiac monitor attempted to reassure the patient that this been going on since Friday she has excellent vital signs.  She has a benign EKG I do not think radiation x-ray would be of any use.  I do not think that additional lab work more than a cardiac enzyme is indicated  MDM  Problems addressed  Co-morbidities and/or factors adding to the complexity or risk for the patient:  Anxiety history  Problems addressed:  Chest pain  Acute problem/illness or progression/exacerbation of chronic problem with potential threat to life/bodily dysfunction?:  Anxiety and chest pain  Differential diagnoses/problems considered: see above     Amount and/or Complexity of Data Reviewed  Independent Historian: none (see above for summary)  External Data Reviewed: notes from previous ED visits, notes from clinic visits, and prescription medications  (see above for summary)  Risk and benefits of testing: discussed   Labs: Labs: ordered and reviewed  Radiology:Radiology: ordered and independent interpretation performed (see above or ED course)  ECG/medicine tests:Radiology: ordered and independent interpretation performed (see above or ED course)  none    Risk  Prescription drug management    Diagnosis or treatment significantly impacted by social determinants of health: psychiatric illness  Shared decision making     Critical Care  none            ED Course as of 07/09/23 0950   [place] Jul 09, 2023   0943 I discussed with the patient Maalox she said she can only take that once a day while told her continue that continue her current medication regimes follow-up with her doctor in [place] in her specialist in [place].  She is prepared for release I went over her negative results she seemed to be quite reassured [DM]      ED Course User Index  [DM] Xu Lindsey MD                 Clinical Impression:   Final diagnoses:  [R07.89] Chest wall pain (Primary)  [F41.9] Anxiety        ED Disposition Condition    Discharge Stable          ED Prescriptions    None       Follow-up Information       Follow up With Specialties Details Why Contact Info    Mike Tuttle MD Family Medicine In 2 days As needed [address]   [place] [address]  [phone]               Xu Lindsey MD  07/09/23 0936

## 2023-07-11 DIAGNOSIS — R31.9 BLOOD IN URINE: Primary | ICD-10-CM

## 2023-07-14 ENCOUNTER — HOSPITAL ENCOUNTER (EMERGENCY)
Facility: HOSPITAL | Age: 53
Discharge: HOME | End: 2023-07-14
Attending: EMERGENCY MEDICINE
Payer: MEDICARE

## 2023-07-14 VITALS
HEIGHT: 67 IN | WEIGHT: 127 LBS | TEMPERATURE: 98 F | SYSTOLIC BLOOD PRESSURE: 152 MMHG | OXYGEN SATURATION: 100 % | DIASTOLIC BLOOD PRESSURE: 83 MMHG | BODY MASS INDEX: 19.93 KG/M2 | HEART RATE: 70 BPM | RESPIRATION RATE: 23 BRPM

## 2023-07-14 DIAGNOSIS — R07.9 CHEST PAIN: ICD-10-CM

## 2023-07-14 DIAGNOSIS — K21.9 GASTROESOPHAGEAL REFLUX DISEASE, UNSPECIFIED WHETHER ESOPHAGITIS PRESENT: ICD-10-CM

## 2023-07-14 DIAGNOSIS — R07.89 ATYPICAL CHEST PAIN: Primary | ICD-10-CM

## 2023-07-14 DIAGNOSIS — F41.9 ANXIETY: ICD-10-CM

## 2023-07-14 LAB
ALBUMIN SERPL-MCNC: 4.4 G/DL (ref 3.5–5)
ALBUMIN/GLOB SERPL: 1.5 RATIO (ref 1.1–2)
ALP SERPL-CCNC: 53 UNIT/L (ref 40–150)
ALT SERPL-CCNC: 24 UNIT/L (ref 0–55)
AST SERPL-CCNC: 22 UNIT/L (ref 5–34)
BASOPHILS # BLD AUTO: 0.02 X10(3)/MCL
BASOPHILS NFR BLD AUTO: 0.8 %
BILIRUBIN DIRECT+TOT PNL SERPL-MCNC: 0.9 MG/DL
BUN SERPL-MCNC: 6.7 MG/DL (ref 9.8–20.1)
CALCIUM SERPL-MCNC: 9.8 MG/DL (ref 8.4–10.2)
CHLORIDE SERPL-SCNC: 106 MMOL/L (ref 98–107)
CO2 SERPL-SCNC: 23 MMOL/L (ref 22–29)
CREAT SERPL-MCNC: 1.1 MG/DL (ref 0.55–1.02)
EOSINOPHIL # BLD AUTO: 0.01 X10(3)/MCL (ref 0–0.9)
EOSINOPHIL NFR BLD AUTO: 0.4 %
ERYTHROCYTE [DISTWIDTH] IN BLOOD BY AUTOMATED COUNT: 13 % (ref 11.5–17)
GFR SERPLBLD CREATININE-BSD FMLA CKD-EPI: 60 MLS/MIN/1.73/M2
GLOBULIN SER-MCNC: 3 GM/DL (ref 2.4–3.5)
GLUCOSE SERPL-MCNC: 93 MG/DL (ref 74–100)
HCT VFR BLD AUTO: 38.8 % (ref 37–47)
HGB BLD-MCNC: 12.3 G/DL (ref 12–16)
IMM GRANULOCYTES # BLD AUTO: 0.01 X10(3)/MCL (ref 0–0.04)
IMM GRANULOCYTES NFR BLD AUTO: 0.4 %
LYMPHOCYTES # BLD AUTO: 0.9 X10(3)/MCL (ref 0.6–4.6)
LYMPHOCYTES NFR BLD AUTO: 34.7 %
MCH RBC QN AUTO: 29 PG (ref 27–31)
MCHC RBC AUTO-ENTMCNC: 31.7 G/DL (ref 33–36)
MCV RBC AUTO: 91.5 FL (ref 80–94)
MONOCYTES # BLD AUTO: 0.21 X10(3)/MCL (ref 0.1–1.3)
MONOCYTES NFR BLD AUTO: 8.1 %
NEUTROPHILS # BLD AUTO: 1.44 X10(3)/MCL (ref 2.1–9.2)
NEUTROPHILS NFR BLD AUTO: 55.6 %
NRBC BLD AUTO-RTO: 0 %
PLATELET # BLD AUTO: 182 X10(3)/MCL (ref 130–400)
PMV BLD AUTO: 11.1 FL (ref 7.4–10.4)
POTASSIUM SERPL-SCNC: 4.1 MMOL/L (ref 3.5–5.1)
PROT SERPL-MCNC: 7.4 GM/DL (ref 6.4–8.3)
RBC # BLD AUTO: 4.24 X10(6)/MCL (ref 4.2–5.4)
SODIUM SERPL-SCNC: 135 MMOL/L (ref 136–145)
TROPONIN I SERPL-MCNC: <0.01 NG/ML (ref 0–0.04)
WBC # SPEC AUTO: 2.59 X10(3)/MCL (ref 4.5–11.5)

## 2023-07-14 PROCEDURE — 93010 EKG 12-LEAD: ICD-10-PCS | Mod: ,,, | Performed by: INTERNAL MEDICINE

## 2023-07-14 PROCEDURE — 93005 ELECTROCARDIOGRAM TRACING: CPT

## 2023-07-14 PROCEDURE — 85025 COMPLETE CBC W/AUTO DIFF WBC: CPT | Performed by: EMERGENCY MEDICINE

## 2023-07-14 PROCEDURE — 80053 COMPREHEN METABOLIC PANEL: CPT | Performed by: EMERGENCY MEDICINE

## 2023-07-14 PROCEDURE — 93010 ELECTROCARDIOGRAM REPORT: CPT | Mod: ,,, | Performed by: INTERNAL MEDICINE

## 2023-07-14 PROCEDURE — 84484 ASSAY OF TROPONIN QUANT: CPT | Performed by: EMERGENCY MEDICINE

## 2023-07-14 PROCEDURE — 99285 EMERGENCY DEPT VISIT HI MDM: CPT | Mod: 25

## 2023-07-14 RX ORDER — MAG HYDROX/ALUMINUM HYD/SIMETH 200-200-20
30 SUSPENSION, ORAL (FINAL DOSE FORM) ORAL
Status: DISCONTINUED | OUTPATIENT
Start: 2023-07-14 | End: 2023-07-14 | Stop reason: HOSPADM

## 2023-07-14 RX ORDER — SUCRALFATE 1 G/1
1 TABLET ORAL
Status: DISCONTINUED | OUTPATIENT
Start: 2023-07-14 | End: 2023-07-14 | Stop reason: HOSPADM

## 2023-07-14 RX ORDER — SUCRALFATE 1 G/1
1 TABLET ORAL 4 TIMES DAILY
Qty: 40 TABLET | Refills: 0 | Status: SHIPPED | OUTPATIENT
Start: 2023-07-14 | End: 2023-07-24

## 2023-07-14 NOTE — ED PROVIDER NOTES
Encounter Date: 7/14/2023    SCRIBE #1 NOTE: I, Jose Roberto Pak, am scribing for, and in the presence of,  Dr. Kaufman. I have scribed the following portions of the note - the EKG reading. Other sections scribed: HPI, ROS, Physical Exam, MDM, Attending.     History     Chief Complaint   Patient presents with    Chest Pain     C/o constant midline chest x6 days that's getting worse with nausea. Denies v/d. Hx anxiety, GERD, angina, sees Dr. Romero for cardiology.      53 y/o female with history of HTN, chronic ITP, Crohn's, GERD, angina and anxiety presents to ED for constant midline chest pain onset about a week ago.  Pt describes the pain as soreness that is worse after eating.  She also complains of nausea, along with diarrhea that has been resolved with imodium.  Pt also takes protonix and Tylenol.  She was told by Dr. Romero, her cardiologist, to take Maalox for this pain, which has provided no relief.  Pt has no cardiac stents.      The history is provided by the patient.   Review of patient's allergies indicates:   Allergen Reactions    Ketorolac      Other reaction(s): SOB, Stops breathing    Prednisone Shortness Of Breath     Other reaction(s): SOB    Amoxicillin-pot clavulanate      Other reaction(s): Hives, RASH    Azithromycin      Other reaction(s): RASH    Ciprofloxacin      Other reaction(s): Hives, RASH    Clindamycin      Other reaction(s): Rash    Diphenoxylate-atropine      Other reaction(s): Hives, RASH    Hydrocodone-acetaminophen      Other reaction(s): Hives, hives, SWELLING, tongue swelling    Iodine      Other reaction(s): Difficulty breathing, Hives, SOB, Swelling    Penicillin      Other reaction(s): Hives, RASH    Shellfish containing products      Other reaction(s): Hives, RASH    Sulfamethoxazole-trimethoprim      Other reaction(s): Hives, RASH     Past Medical History:   Diagnosis Date    Chronic ITP (idiopathic thrombocytopenia)     Crohn's disease     GERD (gastroesophageal reflux disease)      Hypertension     Leukopenia     Psychosomatic disorder     Severe anxiety      Past Surgical History:   Procedure Laterality Date    BONE MARROW BIOPSY  06/13/2016    DILATION AND CURETTAGE OF UTERUS  10/31/2018    PARTIAL HYSTERECTOMY       Family History   Problem Relation Age of Onset    Diabetes Mother     Hypertension Mother     Heart attack Mother     Heart disease Mother     Diabetes Father     Brain cancer Father     ALYSE disease Father     Heart disease Sister     Diabetes Brother      Social History     Tobacco Use    Smoking status: Never    Smokeless tobacco: Never   Substance Use Topics    Alcohol use: Never    Drug use: Never     Review of Systems   Constitutional:  Negative for fever.   HENT:  Negative for sore throat.    Eyes:  Negative for visual disturbance.   Respiratory:  Negative for cough and shortness of breath.    Cardiovascular:  Positive for chest pain.   Gastrointestinal:  Positive for diarrhea and nausea. Negative for abdominal pain, constipation and vomiting.   Genitourinary:  Negative for dysuria and hematuria.   Skin:  Negative for rash.   Neurological:  Negative for syncope and headaches.   All other systems reviewed and are negative.    Physical Exam     Initial Vitals [07/14/23 0829]   BP Pulse Resp Temp SpO2   (!) 177/80 72 16 98.3 °F (36.8 °C) 96 %      MAP       --         Physical Exam    Nursing note and vitals reviewed.  Constitutional: No distress.   HENT:   Head: Normocephalic and atraumatic.   Right Ear: Tympanic membrane normal.   Left Ear: Tympanic membrane normal.   Mouth/Throat: Oropharynx is clear and moist. No oropharyngeal exudate or posterior oropharyngeal erythema.   Eyes: EOM are normal.   Neck: Trachea normal. Neck supple. Carotid bruit is not present. No JVD present.   Normal range of motion.  Cardiovascular:  Normal rate and regular rhythm.           No murmur heard.  Pulmonary/Chest: Breath sounds normal. No respiratory distress. She exhibits no tenderness.    Chest pain reproducible with light palpation to sternum    Abdominal: Abdomen is soft. Bowel sounds are normal. She exhibits no distension. There is no abdominal tenderness.   Musculoskeletal:         General: Normal range of motion.      Cervical back: Normal range of motion and neck supple.      Lumbar back: Normal. Normal range of motion.     Neurological: She is alert and oriented to person, place, and time. She has normal strength. No cranial nerve deficit or sensory deficit.   Psychiatric: Judgment normal.   Anxious        ED Course   Procedures  Labs Reviewed   COMPREHENSIVE METABOLIC PANEL - Abnormal; Notable for the following components:       Result Value    Sodium Level 135 (*)     Blood Urea Nitrogen 6.7 (*)     Creatinine 1.10 (*)     All other components within normal limits   CBC WITH DIFFERENTIAL - Abnormal; Notable for the following components:    WBC 2.59 (*)     MCHC 31.7 (*)     MPV 11.1 (*)     Neut # 1.44 (*)     All other components within normal limits   TROPONIN I - Normal   CBC W/ AUTO DIFFERENTIAL    Narrative:     The following orders were created for panel order CBC auto differential.  Procedure                               Abnormality         Status                     ---------                               -----------         ------                     CBC with Differential[[id number]]        Abnormal            Final result                 Please view results for these tests on the individual orders.     EKG Readings: (Independently Interpreted)   Initial Reading: No STEMI. Rhythm: Sinus Arrhythmia. Heart Rate: 64. Ectopy: No Ectopy. Conduction: Normal. ST Segments: Normal ST Segments. T Waves: Normal. Axis: Normal.   0829     Imaging Results              X-Ray Chest PA And Lateral (Final result)  Result time 07/14/23 09:14:11      Final result by Cordell Vance MD (07/14/23 09:14:11)                   Impression:      No acute cardiopulmonary process.      Electronically signed  by: Cordell Vance  Date:    07/14/2023  Time:    09:14               Narrative:    EXAMINATION:  XR CHEST PA AND LATERAL    CLINICAL HISTORY:  Chest Pain;    TECHNIQUE:  Two views of the chest    COMPARISON:  03/19/2022    FINDINGS:  No focal opacification, pleural effusion, or pneumothorax.    The cardiomediastinal silhouette is within normal limits.    No acute osseous abnormality.                                       Medications   sucralfate tablet 1 g (1 g Oral Not Given 7/14/23 0900)   aluminum-magnesium hydroxide-simethicone 200-200-20 mg/5 mL suspension 30 mL (30 mLs Oral Not Given 7/14/23 0900)              Scribe Attestation:   Scribe #1: I performed the above scribed service and the documentation accurately describes the services I performed. I attest to the accuracy of the note.    Attending Attestation:           Physician Attestation for Scribe:  Physician Attestation Statement for Scribe #1: I, reviewed documentation, as scribed by Jose Roberto Pak in my presence, and it is both accurate and complete.         Medical Decision Making  Differential diagnoses include, but are not limited to: GERD, angina, musculoskeletal pain, anxiety     Amount and/or Complexity of Data Reviewed  Labs: ordered.  Radiology: ordered.  ECG/medicine tests: ordered.      Cardiac etiology ruled out.  Likely GERD-related CP with anxiety undoubtedly contributing.  Pt refused antacids in the ED, telling RN that her MD told her to avoid antacids for 4 hours after taking her Klonopin, which she took this AM.                   Clinical Impression:   Final diagnoses:  [R07.9] Chest pain  [R07.89] Atypical chest pain (Primary)  [K21.9] Gastroesophageal reflux disease, unspecified whether esophagitis present  [F41.9] Anxiety        ED Disposition Condition    Discharge Stable          ED Prescriptions       Medication Sig Dispense Start Date End Date Auth. Provider    sucralfate (CARAFATE) 1 gram tablet Take 1 tablet (1 g total) by mouth  4 (four) times daily. for 10 days 40 tablet 7/14/2023 7/24/2023 Sánchez Kaufman MD          Follow-up Information       Follow up With Specialties Details Why Contact Info    Follow up with your primary MD in 3-5 days if not improved.  Return to ED for worsening symptoms.                 Sánchez Kaufman MD  07/14/23 1012

## 2023-07-24 ENCOUNTER — HOSPITAL ENCOUNTER (OUTPATIENT)
Dept: RADIOLOGY | Facility: HOSPITAL | Age: 53
Discharge: HOME | End: 2023-07-24
Attending: FAMILY MEDICINE
Payer: MEDICARE

## 2023-07-24 DIAGNOSIS — R13.19 ESOPHAGEAL DYSPHAGIA: Primary | ICD-10-CM

## 2023-07-24 DIAGNOSIS — R13.10 DYSPHAGIA: ICD-10-CM

## 2023-07-24 PROCEDURE — 92611 MOTION FLUOROSCOPY/SWALLOW: CPT

## 2023-07-24 PROCEDURE — 74230 X-RAY XM SWLNG FUNCJ C+: CPT | Mod: TC

## 2023-07-24 NOTE — PROGRESS NOTES
Modified Barium Swallow    Patient Name:  Amber Martínez   MRN:  [id number]       07/24/23 0598   (RETIRED) Treatment Time/Intention   Document Type evaluation   Mode of Treatment individual therapy   Patient Effort good   (RETIRED) General Information   Referring Physician Mike Tuttle MD   Patient/Family/Caregiver Comments/Observations Pt reported she has had some difficulty swallowing and occ has globus sensation (Pt pointed b/t clavicles). Pt reported she does have reflux and anxiety. She stated since she  started protonix and anxiety meds her swallowing has improved.       Recommendations:     Recommendations:                General Recommendations:  Follow-up not indicated for ST. Keep upper GI scheduled  Diet recommendations:   Regular texture, TL     Aspiration Precautions: 1 bite/sip at a time, Alternating bites/sips, Avoid talking while eating, Remain upright 30 minutes post meal, and Small bites/sips   General Precautions: Standard,    Communication strategies:  none    Referral     Reason for Referral  Patient was referred for a Modified Barium Swallow Study to assess the efficiency of his/her swallow function, rule out aspiration and make recommendations regarding safe dietary consistencies, effective compensatory strategies, and safe eating environment.     Diagnosis: <principal problem not specified>       History:     Past Medical History:   Diagnosis Date    Chronic ITP (idiopathic thrombocytopenia)     Crohn's disease     GERD (gastroesophageal reflux disease)     Hypertension     Leukopenia     Psychosomatic disorder     Severe anxiety        Objective:     Current Respiratory Status:   Room air    Alert: yes    Cooperative: yes    Follows Directions: yes    Prior Diet: Regular and Thin liquids    Visualization  Patient was seen in the lateral view    Oral Peripheral Examination   WFL    Consistencies Assessed  Thin liquids cup and staw  Puree mashed potatoes  Solids rice  Pt refused meat  due to not eating red meat    Oral Phase:   Adequate lip closure  Adequate bolus formation  Adequate anterior-posterior transport  Adequate mastication    Pharyngeal Phase:   Timely swallow reflex  Adequate base of tongue retraction  Adequate epiglottic deflection  Adequate hyolaryngeal excursion  Adequate airway protection  Consistency Laryngeal Penetration Aspiration Residue   Thin liquid by cup None None None   Thin liquid by straw None None None   Puree None None None   Rice None None None     Cervical Esophageal Phase  Slowed movement of bolus in cervical esophagus.  Pt is to have upper GI completed. ST recommends to keep that appointment scheduled.     Assessment:     Impressions  Patient with oral and pharyngeal phase(s) of swallowing deemed WNL    Activity Capabilities:  dentition, alertness, oral motor abilities, pharyngeal function, comprehension, expression, and cognition    Activity Limitations: none    Prognosis: Good    Plan  No further ST indicated. Keep upper GI appt    Education  Results were discussed with patient. Pt was able to recall safe swallow strategies from MD that ST also provided.           Time Tracking:        Billable Minutes:  Motion Fluoro Swallow, Cine/Vid 60    07/24/2023

## 2023-07-26 ENCOUNTER — HOSPITAL ENCOUNTER (OUTPATIENT)
Dept: RADIOLOGY | Facility: HOSPITAL | Age: 53
Discharge: HOME | End: 2023-07-26
Attending: FAMILY MEDICINE
Payer: MEDICARE

## 2023-07-26 DIAGNOSIS — K21.9 GERD (GASTROESOPHAGEAL REFLUX DISEASE): ICD-10-CM

## 2023-07-26 PROCEDURE — 25500020 PHARM REV CODE 255

## 2023-07-26 PROCEDURE — A9698 NON-RAD CONTRAST MATERIALNOC: HCPCS

## 2023-07-26 PROCEDURE — 74240 X-RAY XM UPR GI TRC 1CNTRST: CPT | Mod: TC

## 2023-07-26 RX ADMIN — BARIUM SULFATE 135 ML: 980 POWDER, FOR SUSPENSION ORAL at 08:07

## 2023-11-14 ENCOUNTER — OFFICE VISIT (OUTPATIENT)
Dept: HEMATOLOGY/ONCOLOGY | Facility: CLINIC | Age: 53
End: 2023-11-14
Payer: MEDICARE

## 2023-11-14 VITALS
DIASTOLIC BLOOD PRESSURE: 88 MMHG | BODY MASS INDEX: 18.94 KG/M2 | OXYGEN SATURATION: 100 % | RESPIRATION RATE: 18 BRPM | HEIGHT: 67 IN | WEIGHT: 120.69 LBS | SYSTOLIC BLOOD PRESSURE: 142 MMHG | HEART RATE: 67 BPM

## 2023-11-14 DIAGNOSIS — D69.3 CHRONIC IDIOPATHIC THROMBOCYTOPENIC PURPURA: Primary | ICD-10-CM

## 2023-11-14 DIAGNOSIS — E46 MALNUTRITION, UNSPECIFIED TYPE: ICD-10-CM

## 2023-11-14 DIAGNOSIS — D72.819 LEUKOPENIA, UNSPECIFIED TYPE: ICD-10-CM

## 2023-11-14 PROCEDURE — 99214 OFFICE O/P EST MOD 30 MIN: CPT | Mod: S$PBB,,, | Performed by: NURSE PRACTITIONER

## 2023-11-14 PROCEDURE — 99999 PR PBB SHADOW E&M-EST. PATIENT-LVL III: ICD-10-PCS | Mod: PBBFAC,,, | Performed by: NURSE PRACTITIONER

## 2023-11-14 PROCEDURE — 99214 PR OFFICE/OUTPT VISIT, EST, LEVL IV, 30-39 MIN: ICD-10-PCS | Mod: S$PBB,,, | Performed by: NURSE PRACTITIONER

## 2023-11-14 PROCEDURE — 99213 OFFICE O/P EST LOW 20 MIN: CPT | Mod: PBBFAC | Performed by: NURSE PRACTITIONER

## 2023-11-14 PROCEDURE — 99999 PR PBB SHADOW E&M-EST. PATIENT-LVL III: CPT | Mod: PBBFAC,,, | Performed by: NURSE PRACTITIONER

## 2023-11-14 RX ORDER — AZELASTINE 1 MG/ML
SPRAY, METERED NASAL
COMMUNITY
Start: 2023-10-09

## 2023-11-14 RX ORDER — ESTRADIOL 10 UG/1
1 INSERT VAGINAL
COMMUNITY

## 2023-11-14 RX ORDER — VALACYCLOVIR HYDROCHLORIDE 1 G/1
TABLET, FILM COATED ORAL
COMMUNITY
Start: 2023-11-10

## 2023-11-14 NOTE — PROGRESS NOTES
Subjective:       Patient ID: Aleyda Peacock is a 52 y.o. female.    Chief Complaint: Follow-up (No concerns today)      Diagnosis:  Chronic ITP-- stable plts.    Dysregulated menstrual bleeding--much improved  Mild Leukopenia- chronic.   Crohns Disease--  managed per GI    Current Treatment:   Observation--secondary to stable platelet count since July 2016 (she has not required Nplate since that time)    Treatment History:  Steroids (prednisone)  Promacta 25 mg by mouth daily, mid-September 2016 x ~2-3 weeks. Discontinued secondary to loss of appetite generalized body aches UTI and leg pain.  N-plate given every other week.   5-7mcg/kg --started May 2012-->attempted to transition patient to Promacta in September 2016. She could not tolerate medication and after ~ 2-3 weeks was changed back N-plate.  N-plate to be held if platelet count greater than 150,000     HPI:  ALEYDA PEACOCK is a 52 years old female with a recurrent history of menorrhagia and polymenorrhea as well as recurrent to bruising. She has a chronic thrombocytopenia presumably secondary to chronic ITP. This was diagnosed more than 5 years ago. She was managed conservatively for the most part with well without significant bleeding or complications. As she had extensive workup in the breast that was largely unrevealing a part from a positive CMV antibody and the positive EBV antibody. She has been on steroids at 60 mg P.O. q. daily for several months and there was gradually tapered down to 20 mg a day. She had an initial response to steroids but later on her clinic chart went down to the range between 40-70. She gives a strong family history of that her sister and her mom have very similar symptoms with the thrombocytopenia easy bruising and menorrhagia. She had workup for von Willebrand disease at twice and was negative. There is a possibility of a rare familial thrombocytopenia but in her prior bone marrow which she had twice in 2007 and 2009  cytogenetics showed a normal female chromosomes 46xx. she has been seeing Dr. Mendoza at the [facility] a more over the past couple years and now she presents to us for further evaluation and management.      BM aspiration/Biopsy 2007 showed normocellular marrow with 50% cellularity. no significant dysplasia. megakaryocytes are present and mature. no evidence of an abnormal infiltrate. FLOW: failed to show any abnormal cell population.  Cytogenetics: 46 XX.  Repeat on BM aspiration/biopsy 2009 showed active erythroid and myelopoiesis, peripheral consumption or sequestration should be considered.       Started on Nplate 5/7/2012 with initially good response, which then leveled off at a dose of 1 mcg per kilogram. She was subsequently titrated upwards in her dosing and is now at 4 mcg per kilogram every other week with good response.    She had a drop in platelets and dose was increased to 5 mcg per kilogram every 2 weeks.  Her dose was then increased to 7 mcg per kilogram every 2 weeks on 10/21/2013     Patient underwent bone marrow biopsy and aspirate on June 13, 2016 out of concern/surveillance while on N-plate to ensure no transformation to myelodysplastic syndrome. Bone marrow biopsy was done without event and showed normocellular hematopoiesis with no evidence of myelodysplasia or dysmorphic megakaryocytes.  In mid September 2016, we attempted to transition patient to Promacta. She could not tolerate medication and after ~ 2-3 weeks was changed back N-plate.  She last received Nplate in July 2016 with no further dosing required secondary to platelet counts being greater than 200,000 consistently     Interval History:   Patient is here today for a follow-up for chronic ITP.  From a hematology standpoint, she is doing well.  Since her last visit she was seen by a urogynecologist that started her on vaginal estrogen, she is no longer having issues with urinary tract infections or vaginal infections.  She  states that she does have side effects from the vaginal estrogen and thinks this is the cause of her decrease in her white blood cell count.   She denies any bleeding or bruising.  She denies any recurrent or high fevers.  No recent infections.  She has concerns about her poor intake.  She states that she is unable to tolerate many foods and was previously on a plant based nondairy supplements that helped her.  She states that her PCP refuses to help her with this or refer her to a dietitian.    .   Past Medical History:   Diagnosis Date    Chronic ITP (idiopathic thrombocytopenia)     Crohn's disease     GERD (gastroesophageal reflux disease)     Hypertension     Leukopenia     Psychosomatic disorder     Severe anxiety       Past Surgical History:   Procedure Laterality Date    BONE MARROW BIOPSY  06/13/2016    DILATION AND CURETTAGE OF UTERUS  10/31/2018    PARTIAL HYSTERECTOMY       Social History     Socioeconomic History    Marital status: Single   Tobacco Use    Smoking status: Never    Smokeless tobacco: Never   Substance and Sexual Activity    Alcohol use: Never    Drug use: Never      Family History   Problem Relation Age of Onset    Diabetes Mother     Hypertension Mother     Heart attack Mother     Heart disease Mother     Diabetes Father     Brain cancer Father     ALYSE disease Father     Heart disease Sister     Diabetes Brother       Review of patient's allergies indicates:   Allergen Reactions    Ketorolac      Other reaction(s): SOB, Stops breathing    Prednisone Shortness Of Breath     Other reaction(s): SOB    Amoxicillin-pot clavulanate      Other reaction(s): Hives, RASH    Azithromycin      Other reaction(s): RASH    Ciprofloxacin      Other reaction(s): Hives, RASH    Clindamycin      Other reaction(s): Rash    Diphenoxylate-atropine      Other reaction(s): Hives, RASH    Hydrocodone-acetaminophen      Other reaction(s): Hives, hives, SWELLING, tongue swelling    Iodine      Other reaction(s):  Difficulty breathing, Hives, SOB, Swelling    Penicillin      Other reaction(s): Hives, RASH    Shellfish containing products      Other reaction(s): Hives, RASH    Sulfamethoxazole-trimethoprim      Other reaction(s): Hives, RASH    Doxycycline       Review of Systems   Constitutional:  Negative for fever.   Cardiovascular:  Negative for leg swelling.   Gastrointestinal:  Negative for anal bleeding and blood in stool.   Genitourinary:  Negative for hematuria, menstrual problem and vaginal bleeding.   Neurological:  Positive for headaches.         Objective:      Physical Exam  Vitals reviewed.   Constitutional:       General: She is awake. She is not in acute distress.     Appearance: Normal appearance. She is well-groomed.   HENT:      Head: Normocephalic.   Eyes:      General: Lids are normal. No scleral icterus.     Extraocular Movements: Extraocular movements intact.      Conjunctiva/sclera: Conjunctivae normal.   Cardiovascular:      Rate and Rhythm: Normal rate and regular rhythm.   Pulmonary:      Effort: Pulmonary effort is normal.      Breath sounds: Normal breath sounds.   Chest:      Chest wall: No tenderness.   Musculoskeletal:         General: No swelling, tenderness or deformity.      Cervical back: Full passive range of motion without pain and neck supple. No tenderness.      Right lower leg: No edema.      Left lower leg: No edema.   Skin:     General: Skin is warm and dry.      Coloration: Skin is not jaundiced or pale.   Neurological:      General: No focal deficit present.      Mental Status: She is alert and oriented to person, place, and time.      Motor: No weakness.      Gait: Gait is intact.   Psychiatric:         Mood and Affect: Mood normal.         Speech: Speech normal.         Behavior: Behavior normal. Behavior is cooperative.         LABS REVIEWED IN [facility]          Assessment:   Chronic ITP-- stable, off of N-plate and on observation  Chronic mild Leukopenia-- stable  Crohn's disease--  managed per GI  Weightloss/anorexia-- managed per GI      Plan:       From a platelet standpoint she is doing very well.  Platelet count today is 180,000.     Continue to follow up with Immunologist as scheduled     Continue to follow up with Dermatologist as scheduled    Discuss possible bone marrow biopsy due to her decrease in white blood cell count, she declines at this time.    Her biggest concern is seeing a dietitian and getting nutritional supplements that she can tolerate.  Will refer to outpatient nutrition services      RTC in 4 months for OV and clinical examination     Labs: CBC, CMP     All questions were answered to the best of my ability.        Asha Cruz, FNP-C

## 2023-11-24 ENCOUNTER — HOSPITAL ENCOUNTER (EMERGENCY)
Facility: HOSPITAL | Age: 53
Discharge: HOME | End: 2023-11-24
Attending: EMERGENCY MEDICINE
Payer: MEDICARE

## 2023-11-24 VITALS
HEART RATE: 70 BPM | SYSTOLIC BLOOD PRESSURE: 178 MMHG | HEIGHT: 67 IN | OXYGEN SATURATION: 100 % | RESPIRATION RATE: 18 BRPM | DIASTOLIC BLOOD PRESSURE: 70 MMHG | TEMPERATURE: 98 F | BODY MASS INDEX: 18.99 KG/M2 | WEIGHT: 121 LBS

## 2023-11-24 DIAGNOSIS — R11.2 NAUSEA AND VOMITING, UNSPECIFIED VOMITING TYPE: Primary | ICD-10-CM

## 2023-11-24 LAB
ABS NEUT CALC (OHS): 1.03 X10(3)/MCL (ref 2.1–9.2)
ALBUMIN SERPL-MCNC: 4.3 G/DL (ref 3.5–5)
ALBUMIN/GLOB SERPL: 1.2 RATIO (ref 1.1–2)
ALP SERPL-CCNC: 67 UNIT/L (ref 40–150)
ALT SERPL-CCNC: 16 UNIT/L (ref 0–55)
APPEARANCE UR: CLEAR
AST SERPL-CCNC: 24 UNIT/L (ref 5–34)
BACTERIA #/AREA URNS AUTO: ABNORMAL /HPF
BASOPHILS NFR BLD MANUAL: 0.02 X10(3)/MCL (ref 0–0.2)
BASOPHILS NFR BLD MANUAL: 1 % (ref 0–2)
BILIRUB SERPL-MCNC: 0.5 MG/DL
BILIRUB UR QL STRIP.AUTO: NEGATIVE
BUN SERPL-MCNC: 7 MG/DL (ref 9.8–20.1)
CALCIUM SERPL-MCNC: 9.8 MG/DL (ref 8.4–10.2)
CHLORIDE SERPL-SCNC: 105 MMOL/L (ref 98–107)
CO2 SERPL-SCNC: 28 MMOL/L (ref 22–29)
COLOR UR AUTO: YELLOW
CREAT SERPL-MCNC: 1.01 MG/DL (ref 0.55–1.02)
ERYTHROCYTE [DISTWIDTH] IN BLOOD BY AUTOMATED COUNT: 12.6 % (ref 11.5–17)
GFR SERPLBLD CREATININE-BSD FMLA CKD-EPI: >60 MLS/MIN/1.73/M2
GLOBULIN SER-MCNC: 3.6 GM/DL (ref 2.4–3.5)
GLUCOSE SERPL-MCNC: 74 MG/DL (ref 74–100)
GLUCOSE UR QL STRIP.AUTO: NEGATIVE
HCT VFR BLD AUTO: 41.6 % (ref 37–47)
HGB BLD-MCNC: 12.8 G/DL (ref 12–16)
KETONES UR QL STRIP.AUTO: NEGATIVE
LEUKOCYTE ESTERASE UR QL STRIP.AUTO: NEGATIVE
LIPASE SERPL-CCNC: 23 U/L
LYMPHOCYTES NFR BLD MANUAL: 1.13 X10(3)/MCL
LYMPHOCYTES NFR BLD MANUAL: 46 % (ref 13–40)
MCH RBC QN AUTO: 29.8 PG (ref 27–31)
MCHC RBC AUTO-ENTMCNC: 30.8 G/DL (ref 33–36)
MCV RBC AUTO: 96.7 FL (ref 80–94)
MONOCYTES NFR BLD MANUAL: 0.27 X10(3)/MCL (ref 0.1–1.3)
MONOCYTES NFR BLD MANUAL: 11 % (ref 2–11)
NEUTROPHILS NFR BLD MANUAL: 42 % (ref 47–80)
NITRITE UR QL STRIP.AUTO: NEGATIVE
PH UR STRIP.AUTO: 6 [PH]
PLATELET # BLD AUTO: 206 X10(3)/MCL (ref 130–400)
PLATELET # BLD EST: ADEQUATE 10*3/UL
PMV BLD AUTO: 11 FL (ref 7.4–10.4)
POTASSIUM SERPL-SCNC: 3.9 MMOL/L (ref 3.5–5.1)
PROT SERPL-MCNC: 7.9 GM/DL (ref 6.4–8.3)
PROT UR QL STRIP.AUTO: NEGATIVE
RBC # BLD AUTO: 4.3 X10(6)/MCL (ref 4.2–5.4)
RBC #/AREA URNS AUTO: ABNORMAL /HPF
RBC MORPH BLD: NORMAL
RBC UR QL AUTO: ABNORMAL
SODIUM SERPL-SCNC: 139 MMOL/L (ref 136–145)
SP GR UR STRIP.AUTO: 1.01 (ref 1–1.03)
SQUAMOUS #/AREA URNS AUTO: ABNORMAL /HPF
UROBILINOGEN UR STRIP-ACNC: 0.2
WBC # SPEC AUTO: 2.45 X10(3)/MCL (ref 4.5–11.5)
WBC #/AREA URNS AUTO: ABNORMAL /HPF

## 2023-11-24 PROCEDURE — 83690 ASSAY OF LIPASE: CPT | Performed by: NURSE PRACTITIONER

## 2023-11-24 PROCEDURE — 99285 EMERGENCY DEPT VISIT HI MDM: CPT | Mod: 25

## 2023-11-24 PROCEDURE — 96374 THER/PROPH/DIAG INJ IV PUSH: CPT

## 2023-11-24 PROCEDURE — 96361 HYDRATE IV INFUSION ADD-ON: CPT

## 2023-11-24 PROCEDURE — 80053 COMPREHEN METABOLIC PANEL: CPT | Performed by: NURSE PRACTITIONER

## 2023-11-24 PROCEDURE — 81001 URINALYSIS AUTO W/SCOPE: CPT | Performed by: NURSE PRACTITIONER

## 2023-11-24 PROCEDURE — 63600175 PHARM REV CODE 636 W HCPCS: Performed by: NURSE PRACTITIONER

## 2023-11-24 PROCEDURE — 85027 COMPLETE CBC AUTOMATED: CPT | Performed by: NURSE PRACTITIONER

## 2023-11-24 PROCEDURE — 25000003 PHARM REV CODE 250: Performed by: NURSE PRACTITIONER

## 2023-11-24 RX ORDER — ONDANSETRON 4 MG/1
4 TABLET, FILM COATED ORAL EVERY 6 HOURS
Qty: 12 TABLET | Refills: 0 | Status: SHIPPED | OUTPATIENT
Start: 2023-11-24

## 2023-11-24 RX ORDER — ONDANSETRON 2 MG/ML
4 INJECTION INTRAMUSCULAR; INTRAVENOUS
Status: COMPLETED | OUTPATIENT
Start: 2023-11-24 | End: 2023-11-24

## 2023-11-24 RX ORDER — DIPHENHYDRAMINE HYDROCHLORIDE 50 MG/ML
50 INJECTION INTRAMUSCULAR; INTRAVENOUS ONCE
Status: DISCONTINUED | OUTPATIENT
Start: 2023-11-24 | End: 2023-11-24

## 2023-11-24 RX ORDER — LACTULOSE 10 G/15ML
10 SOLUTION ORAL 3 TIMES DAILY
Qty: 135 ML | Refills: 0 | Status: SHIPPED | OUTPATIENT
Start: 2023-11-24 | End: 2023-11-27

## 2023-11-24 RX ORDER — SODIUM CHLORIDE 9 MG/ML
1000 INJECTION, SOLUTION INTRAVENOUS
Status: COMPLETED | OUTPATIENT
Start: 2023-11-24 | End: 2023-11-24

## 2023-11-24 RX ADMIN — ONDANSETRON 4 MG: 2 INJECTION INTRAMUSCULAR; INTRAVENOUS at 12:11

## 2023-11-24 RX ADMIN — SODIUM CHLORIDE 1000 ML: 9 INJECTION, SOLUTION INTRAVENOUS at 12:11

## 2023-11-24 NOTE — ED PROVIDER NOTES
Encounter Date: 11/24/2023       History     Chief Complaint   Patient presents with    Emesis     Pt c/o abd pain , vomiting and diarrhea x 5 days. Pt states now feels week and dizzy.     See MDM    The history is provided by the patient. No  was used.     Review of patient's allergies indicates:   Allergen Reactions    Ketorolac      Other reaction(s): SOB, Stops breathing    Prednisone Shortness Of Breath     Other reaction(s): SOB    Amoxicillin-pot clavulanate      Other reaction(s): Hives, RASH    Azithromycin      Other reaction(s): RASH    Ciprofloxacin      Other reaction(s): Hives, RASH    Clindamycin      Other reaction(s): Rash    Diphenoxylate-atropine      Other reaction(s): Hives, RASH    Hydrocodone-acetaminophen      Other reaction(s): Hives, hives, SWELLING, tongue swelling    Iodine      Other reaction(s): Difficulty breathing, Hives, SOB, Swelling    Penicillin      Other reaction(s): Hives, RASH    Shellfish containing products      Other reaction(s): Hives, RASH    Sulfamethoxazole-trimethoprim      Other reaction(s): Hives, RASH    Doxycycline     Zoloft [sertraline] Other (See Comments)     Serotonin syndrome      Past Medical History:   Diagnosis Date    Chronic ITP (idiopathic thrombocytopenia)     Crohn's disease     GERD (gastroesophageal reflux disease)     Hypertension     Leukopenia     Psychosomatic disorder     Severe anxiety      Past Surgical History:   Procedure Laterality Date    BONE MARROW BIOPSY  06/13/2016    DILATION AND CURETTAGE OF UTERUS  10/31/2018    PARTIAL HYSTERECTOMY       Family History   Problem Relation Age of Onset    Diabetes Mother     Hypertension Mother     Heart attack Mother     Heart disease Mother     Diabetes Father     Brain cancer Father     ALYSE disease Father     Heart disease Sister     Diabetes Brother      Social History     Tobacco Use    Smoking status: Never    Smokeless tobacco: Never   Substance Use Topics    Alcohol use:  Never    Drug use: Never     Review of Systems   Constitutional:  Negative for fever.   Respiratory:  Negative for cough and shortness of breath.    Cardiovascular:  Negative for chest pain.   Gastrointestinal:  Positive for abdominal pain, diarrhea, nausea and vomiting.   Genitourinary:  Negative for difficulty urinating and dysuria.   Musculoskeletal:  Negative for gait problem.   Skin:  Negative for color change.   Neurological:  Positive for dizziness and weakness. Negative for speech difficulty and headaches.   Psychiatric/Behavioral:  Negative for hallucinations and suicidal ideas.    All other systems reviewed and are negative.      Physical Exam     Initial Vitals [11/24/23 1229]   BP Pulse Resp Temp SpO2   (!) 178/70 70 18 98.1 °F (36.7 °C) 100 %      MAP       --         Physical Exam    Nursing note and vitals reviewed.  Constitutional: She appears well-developed and well-nourished.   HENT:   Head: Normocephalic.   Eyes: EOM are normal.   Neck:   Normal range of motion.  Cardiovascular:  Normal rate, regular rhythm, normal heart sounds and intact distal pulses.           Pulmonary/Chest: Breath sounds normal. No respiratory distress.   Abdominal: Abdomen is soft. Bowel sounds are normal. There is no abdominal tenderness.   Musculoskeletal:         General: Normal range of motion.      Cervical back: Normal range of motion.     Neurological: She is alert and oriented to person, place, and time. She has normal strength.   Skin: Skin is warm and dry.   Psychiatric: She has a normal mood and affect. Her behavior is normal. Judgment and thought content normal.         ED Course   Procedures  Labs Reviewed   COMPREHENSIVE METABOLIC PANEL - Abnormal; Notable for the following components:       Result Value    Blood Urea Nitrogen 7.0 (*)     Globulin 3.6 (*)     All other components within normal limits   URINALYSIS, REFLEX TO URINE CULTURE - Abnormal; Notable for the following components:    Blood, UA  Trace-Intact (*)     All other components within normal limits   CBC WITH DIFFERENTIAL - Abnormal; Notable for the following components:    WBC 2.45 (*)     MCV 96.7 (*)     MCHC 30.8 (*)     MPV 11.0 (*)     All other components within normal limits   MANUAL DIFFERENTIAL - Abnormal; Notable for the following components:    Neutrophils % 42 (*)     Lymphs % 46 (*)     Neutrophils Abs Calc 1.029 (*)     All other components within normal limits   URINALYSIS, MICROSCOPIC - Abnormal; Notable for the following components:    Bacteria, UA Few (*)     Squamous Epithelial Cells, UA Moderate (*)     All other components within normal limits   LIPASE - Normal   CBC W/ AUTO DIFFERENTIAL    Narrative:     The following orders were created for panel order CBC auto differential.  Procedure                               Abnormality         Status                     ---------                               -----------         ------                     CBC with Differential[[id number]]        Abnormal            Final result               Manual Differential[[id number]]          Abnormal            Final result                 Please view results for these tests on the individual orders.          Imaging Results              CT Abdomen Pelvis  Without Contrast (Final result)  Result time 11/24/23 14:50:30   Procedure changed from CT Abdomen Pelvis With IV Contrast NO Oral Contrast     Final result by Xu Kim MD (11/24/23 14:50:30)                   Impression:      1. Findings of constipation  2. Trace free fluid posterior lower pelvis  3. Ill-defined soft tissue fullness at the cervical vaginal region.  Clinical correlation is indicated      Electronically signed by: Xu Kim  Date:    11/24/2023  Time:    14:50               Narrative:    EXAMINATION:  CT ABDOMEN PELVIS WITHOUT CONTRAST    CLINICAL HISTORY:  Abdominal pain, acute, nonlocalized;, .    TECHNIQUE:  PATIENT RADIATION DOSE: DLP(mGycm) 148    As per PQRS  measures, all CT scans at this facility used dose modulation, iterative reconstruction, and/or weight based dose adjustment when appropriate to reduce radiation dose to as low as reasonably achievable.    COMPARISON:  None available.    FINDINGS:  Serial axial images were obtained of the abdomen pelvis without the administration of IV contrast.  Additional sagittal and coronal reconstructions were performed. Mild degenerative changes are noted to the thoracolumbar spine.  The heart is borderline enlarged.  Mild atelectasis and/or scarring is evident at the lung bases the liver, spleen, adrenal glands, and pancreas are grossly within normal limits without the administration of IV contrast.  The kidneys are relatively symmetric in size.  No hydronephrosis is seen.  The periaortic and pericaval lymph nodes are suboptimally evaluated secondary to the considerable paucity of intraperitoneal fat and lack of IV and oral contrast.  Dense feces is evident throughout the joint the colon including the rectum.  There is trace free fluid at the posterior lower pelvis.  There is ill-defined soft tissue fullness at the cervical vaginal region.  Gas is present within the vaginal vault.  The appendix is questionably identified.  No definitive evidence of acute appendicitis is seen.  The stomach is partially distended with particulate food matter.                                       Medications   0.9%  NaCl infusion (1,000 mLs Intravenous New Bag 11/24/23 1250)   ondansetron injection 4 mg (4 mg Intravenous Given 11/24/23 1250)     Medical Decision Making  Historian:  Patient.  Patient is a 52-year-old female  that presents with periumbilical abdominal pain that has been present 5 days. Associated symptoms nausea, vomiting, diarrhea, weakness, and dizziness. Surrounding information is started after eating food her sister cooked. Exacerbated by nothing. Relieved by nothing. Patient treatment prior to arrival none. Risk factors  include none. Other history pertaining to this complaint nothing.   Assessment:  See physical exam.  DD:  Gastroenteritis, gastritis, diverticulitis, pancreatitis, cholecystitis, colitis      Amount and/or Complexity of Data Reviewed  Labs: ordered.     Details: Labs were unremarkable  Radiology: ordered.     Details: CT showed constipation.  Also showed some cervical fullness.  Discussion of management or test interpretation with external provider(s): History was obtained.  Physical was performed.  Patient appears to have constipation.  I will put her on some lactulose and Zofran for nausea vomiting.  I did explain to her about the cervical fullness and gas in the vagina.  She did recently see her OBGYN had a Pap smear and states everything was okay.  She will follow up with her gyn.  No medical or surgical consult indicated in the ER.  No social determinants that affect healthcare were noted.    Risk  Prescription drug management.               ED Course as of 11/24/23 1503   Fri Nov 24, 2023   1336 Patient refused CT with contrast. Would like it without contrast. I did explain that some conditions may not be seen without contrast.  [CL]      ED Course User Index  [CL] Sterling Delvalle FNP                        Clinical Impression:  Final diagnoses:  [R11.2] Nausea and vomiting, unspecified vomiting type (Primary)          ED Disposition Condition    Discharge Stable          ED Prescriptions       Medication Sig Dispense Start Date End Date Auth. Provider    ondansetron (ZOFRAN) 4 MG tablet Take 1 tablet (4 mg total) by mouth every 6 (six) hours. 12 tablet 11/24/2023 -- Sterling Delvalle FNP    lactulose (CHRONULAC) 20 gram/30 mL Soln Take 15 mLs (10 g total) by mouth 3 (three) times daily. for 3 days 135 mL 11/24/2023 11/27/2023 Sterling Delvalle FNP          Follow-up Information       Follow up With Specialties Details Why Contact Info    Your Obstetrician/Gynecologist  Call in 3 days ed follow up               Sterling Delvalle, [facility]  11/24/23 1509

## 2023-12-01 ENCOUNTER — HOSPITAL ENCOUNTER (EMERGENCY)
Facility: HOSPITAL | Age: 53
Discharge: HOME | End: 2023-12-01
Attending: EMERGENCY MEDICINE
Payer: MEDICARE

## 2023-12-01 VITALS
OXYGEN SATURATION: 100 % | HEART RATE: 65 BPM | SYSTOLIC BLOOD PRESSURE: 166 MMHG | DIASTOLIC BLOOD PRESSURE: 75 MMHG | HEIGHT: 67 IN | RESPIRATION RATE: 18 BRPM | TEMPERATURE: 98 F | WEIGHT: 121 LBS | BODY MASS INDEX: 18.99 KG/M2

## 2023-12-01 DIAGNOSIS — R07.9 CHEST PAIN: ICD-10-CM

## 2023-12-01 DIAGNOSIS — K21.9 GASTROESOPHAGEAL REFLUX DISEASE, UNSPECIFIED WHETHER ESOPHAGITIS PRESENT: Primary | ICD-10-CM

## 2023-12-01 LAB
ALBUMIN SERPL-MCNC: 4.3 G/DL (ref 3.5–5)
ALBUMIN/GLOB SERPL: 1.3 RATIO (ref 1.1–2)
ALP SERPL-CCNC: 68 UNIT/L (ref 40–150)
ALT SERPL-CCNC: 19 UNIT/L (ref 0–55)
AST SERPL-CCNC: 26 UNIT/L (ref 5–34)
BASOPHILS # BLD AUTO: 0.03 X10(3)/MCL
BASOPHILS NFR BLD AUTO: 1.1 %
BILIRUB SERPL-MCNC: 0.5 MG/DL
BNP BLD-MCNC: <10 PG/ML
BUN SERPL-MCNC: 7.1 MG/DL (ref 9.8–20.1)
CALCIUM SERPL-MCNC: 9.5 MG/DL (ref 8.4–10.2)
CHLORIDE SERPL-SCNC: 106 MMOL/L (ref 98–107)
CO2 SERPL-SCNC: 27 MMOL/L (ref 22–29)
CREAT SERPL-MCNC: 1.06 MG/DL (ref 0.55–1.02)
EOSINOPHIL # BLD AUTO: 0.03 X10(3)/MCL (ref 0–0.9)
EOSINOPHIL NFR BLD AUTO: 1.1 %
ERYTHROCYTE [DISTWIDTH] IN BLOOD BY AUTOMATED COUNT: 12.5 % (ref 11.5–17)
GFR SERPLBLD CREATININE-BSD FMLA CKD-EPI: >60 MLS/MIN/1.73/M2
GLOBULIN SER-MCNC: 3.2 GM/DL (ref 2.4–3.5)
GLUCOSE SERPL-MCNC: 86 MG/DL (ref 74–100)
HCT VFR BLD AUTO: 40.5 % (ref 37–47)
HGB BLD-MCNC: 12.6 G/DL (ref 12–16)
IMM GRANULOCYTES # BLD AUTO: 0.01 X10(3)/MCL (ref 0–0.04)
IMM GRANULOCYTES NFR BLD AUTO: 0.4 %
LIPASE SERPL-CCNC: 19 U/L
LYMPHOCYTES # BLD AUTO: 1.06 X10(3)/MCL (ref 0.6–4.6)
LYMPHOCYTES NFR BLD AUTO: 38.1 %
MCH RBC QN AUTO: 30.7 PG (ref 27–31)
MCHC RBC AUTO-ENTMCNC: 31.1 G/DL (ref 33–36)
MCV RBC AUTO: 98.5 FL (ref 80–94)
MONOCYTES # BLD AUTO: 0.36 X10(3)/MCL (ref 0.1–1.3)
MONOCYTES NFR BLD AUTO: 12.9 %
NEUTROPHILS # BLD AUTO: 1.29 X10(3)/MCL (ref 2.1–9.2)
NEUTROPHILS NFR BLD AUTO: 46.4 %
NRBC BLD AUTO-RTO: 0 %
PLATELET # BLD AUTO: 185 X10(3)/MCL (ref 130–400)
PMV BLD AUTO: 11.4 FL (ref 7.4–10.4)
POTASSIUM SERPL-SCNC: 3.9 MMOL/L (ref 3.5–5.1)
PROT SERPL-MCNC: 7.5 GM/DL (ref 6.4–8.3)
RBC # BLD AUTO: 4.11 X10(6)/MCL (ref 4.2–5.4)
SODIUM SERPL-SCNC: 139 MMOL/L (ref 136–145)
TROPONIN I SERPL-MCNC: <0.01 NG/ML (ref 0–0.04)
WBC # SPEC AUTO: 2.78 X10(3)/MCL (ref 4.5–11.5)

## 2023-12-01 PROCEDURE — 85025 COMPLETE CBC W/AUTO DIFF WBC: CPT | Performed by: NURSE PRACTITIONER

## 2023-12-01 PROCEDURE — 83690 ASSAY OF LIPASE: CPT | Performed by: NURSE PRACTITIONER

## 2023-12-01 PROCEDURE — 93005 ELECTROCARDIOGRAM TRACING: CPT

## 2023-12-01 PROCEDURE — 93010 ELECTROCARDIOGRAM REPORT: CPT | Mod: ,,, | Performed by: INTERNAL MEDICINE

## 2023-12-01 PROCEDURE — 25000003 PHARM REV CODE 250

## 2023-12-01 PROCEDURE — 93010 EKG 12-LEAD: ICD-10-PCS | Mod: ,,, | Performed by: INTERNAL MEDICINE

## 2023-12-01 PROCEDURE — 99285 EMERGENCY DEPT VISIT HI MDM: CPT | Mod: 25

## 2023-12-01 PROCEDURE — 84484 ASSAY OF TROPONIN QUANT: CPT | Performed by: NURSE PRACTITIONER

## 2023-12-01 PROCEDURE — 83880 ASSAY OF NATRIURETIC PEPTIDE: CPT | Performed by: NURSE PRACTITIONER

## 2023-12-01 PROCEDURE — 80053 COMPREHEN METABOLIC PANEL: CPT | Performed by: NURSE PRACTITIONER

## 2023-12-01 RX ORDER — SUCRALFATE 1 G/1
1 TABLET ORAL
Status: COMPLETED | OUTPATIENT
Start: 2023-12-01 | End: 2023-12-01

## 2023-12-01 RX ORDER — SUCRALFATE 1 G/1
1 TABLET ORAL 3 TIMES DAILY PRN
Qty: 30 TABLET | Refills: 0 | OUTPATIENT
Start: 2023-12-01 | End: 2023-12-06

## 2023-12-01 RX ADMIN — SUCRALFATE 1 G: 1 TABLET ORAL at 04:12

## 2023-12-01 NOTE — FIRST PROVIDER EVALUATION
Medical screening examination initiated.  I have conducted a focused provider triage encounter, findings are as follows:    Brief history of present illness:  Patient states chest pain, heart burn, and SOB.     There were no vitals filed for this visit.    Pertinent physical exam:  Awake, alert, ambulatory    Brief workup plan:  Labs, EKG, Imaging    Preliminary workup initiated; this workup will be continued and followed by the physician or advanced practice provider that is assigned to the patient when roomed.

## 2023-12-01 NOTE — ED PROVIDER NOTES
Encounter Date: 12/1/2023       History     Chief Complaint   Patient presents with    Heartburn     C/o heartburn radiating to back with decreased appetite x3 weeks. Denies relief with Maalox or PPI.      See MDM for details     The history is provided by the patient.     Review of patient's allergies indicates:   Allergen Reactions    Ketorolac      Other reaction(s): SOB, Stops breathing    Prednisone Shortness Of Breath     Other reaction(s): SOB    Amoxicillin-pot clavulanate      Other reaction(s): Hives, RASH    Azithromycin      Other reaction(s): RASH    Ciprofloxacin      Other reaction(s): Hives, RASH    Clindamycin      Other reaction(s): Rash    Diphenoxylate-atropine      Other reaction(s): Hives, RASH    Hydrocodone-acetaminophen      Other reaction(s): Hives, hives, SWELLING, tongue swelling    Iodine      Other reaction(s): Difficulty breathing, Hives, SOB, Swelling    Penicillin      Other reaction(s): Hives, RASH    Shellfish containing products      Other reaction(s): Hives, RASH    Sulfamethoxazole-trimethoprim      Other reaction(s): Hives, RASH    Doxycycline     Zoloft [sertraline] Other (See Comments)     Serotonin syndrome      Past Medical History:   Diagnosis Date    Chronic ITP (idiopathic thrombocytopenia)     Crohn's disease     GERD (gastroesophageal reflux disease)     Hypertension     Leukopenia     Psychosomatic disorder     Severe anxiety      Past Surgical History:   Procedure Laterality Date    BONE MARROW BIOPSY  06/13/2016    DILATION AND CURETTAGE OF UTERUS  10/31/2018    PARTIAL HYSTERECTOMY       Family History   Problem Relation Age of Onset    Diabetes Mother     Hypertension Mother     Heart attack Mother     Heart disease Mother     Diabetes Father     Brain cancer Father     ALYSE disease Father     Heart disease Sister     Diabetes Brother      Social History     Tobacco Use    Smoking status: Never    Smokeless tobacco: Never   Substance Use Topics    Alcohol use:  Never    Drug use: Never     Review of Systems   Constitutional:  Negative for chills and fever.   HENT:  Negative for congestion.    Respiratory:  Negative for shortness of breath and wheezing.    Cardiovascular:  Negative for chest pain.   Gastrointestinal:  Positive for abdominal pain and nausea. Negative for constipation, diarrhea and vomiting.   All other systems reviewed and are negative.      Physical Exam     Initial Vitals [12/01/23 1326]   BP Pulse Resp Temp SpO2   (!) 162/73 64 20 97.9 °F (36.6 °C) 99 %      MAP       --         Physical Exam    Nursing note and vitals reviewed.  Constitutional: She appears well-developed and well-nourished. No distress.   HENT:   Head: Normocephalic and atraumatic.   Eyes: Conjunctivae and EOM are normal.   Neck:   Normal range of motion.  Cardiovascular:  Normal rate, regular rhythm and normal heart sounds.           Pulmonary/Chest: Breath sounds normal. No respiratory distress.   Abdominal: Abdomen is soft. Bowel sounds are normal. She exhibits no distension.   Mild tender to palpation to epigastrium. There is no rebound and no guarding.   Musculoskeletal:         General: Normal range of motion.      Cervical back: Normal range of motion.     Neurological: She is alert and oriented to person, place, and time. GCS score is 15. GCS eye subscore is 4. GCS verbal subscore is 5. GCS motor subscore is 6.   Skin: Skin is warm and dry.   Psychiatric: She has a normal mood and affect. Thought content normal.         ED Course   Procedures  Labs Reviewed   COMPREHENSIVE METABOLIC PANEL - Abnormal; Notable for the following components:       Result Value    Blood Urea Nitrogen 7.1 (*)     Creatinine 1.06 (*)     All other components within normal limits   CBC WITH DIFFERENTIAL - Abnormal; Notable for the following components:    WBC 2.78 (*)     RBC 4.11 (*)     MCV 98.5 (*)     MCHC 31.1 (*)     MPV 11.4 (*)     Neut # 1.29 (*)     All other components within normal limits    TROPONIN I - Normal   B-TYPE NATRIURETIC PEPTIDE - Normal   LIPASE - Normal   CBC W/ AUTO DIFFERENTIAL    Narrative:     The following orders were created for panel order CBC Auto Differential.  Procedure                               Abnormality         Status                     ---------                               -----------         ------                     CBC with Differential[[id number]]       Abnormal            Final result                 Please view results for these tests on the individual orders.          Imaging Results              X-Ray Chest PA And Lateral (Final result)  Result time 12/01/23 14:09:11      Final result by Marcelo Urbano MD (12/01/23 14:09:11)                   Impression:      No acute cardiopulmonary process identified.      Electronically signed by: Marcelo Urbano  Date:    12/01/2023  Time:    14:09               Narrative:    EXAMINATION:  XR CHEST PA AND LATERAL    CLINICAL HISTORY:  chest pain;    TECHNIQUE:  Two views    COMPARISON:  July 14, 2023.    FINDINGS:  Cardiopericardial silhouette is within normal limits.  Lungs are remarkable for emphysematous changes without acute dense focal or segmental consolidation, congestion, pleural effusion or pneumothorax.                                       Medications   sucralfate tablet 1 g (1 g Oral Given 12/1/23 1605)     Medical Decision Making  52-year-old female presents to the ER for evaluation worsening heartburn x3 weeks.  Patient reports a chronic history of gastric reflux for which she takes Maalox and Protonix at home.  She shares that typically her at-home medications relieve her symptoms, however, over the last few weeks it has not been helping.  She describes her epigastric pain as constant and varying in intensity and character.  She reports that she is seen her primary care provider and GI specialist for this problem and she is been told to continue her Protonix.  Patient reports some intermittent nausea.   Denies any vomiting.  She denies any abdominal pain other than her epigastric burning pain.  She denies any changes in urinary frequency.  She denies any bowel disturbance.  She denies any fever or chills.  She denies any shortness of breath.  She denies any weakness.    Carafate given in ED with relief of symptoms.  Workup in ED negative for any acute abnormalities.  She also did have a cardiac workup which was negative.  I discussed negative findings with the patient she verbalized her understanding.  Suspect that symptoms are secondary to chronic gastritis secondary to GERD.  Patient verbalized her understanding of this.  We will discharge home with Carafate.  Recommend that she continues to take at home Maalox and Protonix.  Recommend that she continues to see PCP and GI specialist for continued management of GERD.    Amount and/or Complexity of Data Reviewed  External Data Reviewed: radiology.     Details: CT abdomen and pelvis from 11/24/2023 reviewed.  Outpatient barium swallow study reviewed which showed mild gastritis.  Labs: ordered. Decision-making details documented in ED Course.  Radiology: ordered. Decision-making details documented in ED Course.  ECG/medicine tests: ordered and independent interpretation performed. Decision-making details documented in ED Course.     Details: See EKG    Risk  Prescription drug management.      Additional MDM:   Differential Diagnosis:   Other: The following diagnoses were also considered and will be evaluated: Gastric ulcer, Cholecystitis and MI.            ED Course as of 12/01/23 1745   Fri Dec 01, 2023   1552 CBC:  WBC 2.78, RBC 4.11.  Labs comparable to previous lab works in the past. [LM]   1552 CMP: BUN 7.1, creatinine 1.06 [LM]   1552 Troponin negative [LM]   1553 BNP negative [LM]   1553 Lipase within normal limits [LM]   1553 Chest x-ray without acute cardiopulmonary abnormalities [LM]   1652 EKG normal sinus rhythm 66 beats per minute.  No T-wave or ST  abnormalities [LM]      ED Course User Index  [LM] Velia Kruse PA                           Clinical Impression:  Final diagnoses:  [R07.9] Chest pain  [K21.9] Gastroesophageal reflux disease, unspecified whether esophagitis present (Primary)          ED Disposition Condition    Discharge Stable          ED Prescriptions       Medication Sig Dispense Start Date End Date Auth. Provider    sucralfate (CARAFATE) 1 gram tablet Take 1 tablet (1 g total) by mouth 3 (three) times daily as needed (abdominal pain). 30 tablet 12/1/2023 -- Velia Kruse PA          Follow-up Information       Follow up With Specialties Details Why Contact Info    Mike Tuttle MD Family Medicine Call in 1 day To follow-up for GERD [address] [place] [address]  [phone]               Velia Kruse PA  12/01/23 1670

## 2023-12-01 NOTE — DISCHARGE INSTRUCTIONS
Continue taking daily medications as previously prescribed.  Recommend that you take Carafate as needed 3 times daily for worsening heartburn and abdominal pain.      Recommend that he follow up with your primary care provider for continued management of this problem.  You may also need to see a GI specialist again in the future.      Return to ER with worsening symptoms.

## 2023-12-06 ENCOUNTER — HOSPITAL ENCOUNTER (EMERGENCY)
Facility: HOSPITAL | Age: 53
Discharge: HOME | End: 2023-12-06
Attending: STUDENT IN AN ORGANIZED HEALTH CARE EDUCATION/TRAINING PROGRAM
Payer: MEDICARE

## 2023-12-06 VITALS
OXYGEN SATURATION: 100 % | SYSTOLIC BLOOD PRESSURE: 158 MMHG | RESPIRATION RATE: 18 BRPM | WEIGHT: 125 LBS | DIASTOLIC BLOOD PRESSURE: 83 MMHG | HEIGHT: 67 IN | HEART RATE: 56 BPM | BODY MASS INDEX: 19.62 KG/M2 | TEMPERATURE: 98 F

## 2023-12-06 DIAGNOSIS — K30 INDIGESTION: Primary | ICD-10-CM

## 2023-12-06 LAB
ALBUMIN SERPL-MCNC: 4.4 G/DL (ref 3.5–5)
ALBUMIN/GLOB SERPL: 1.3 RATIO (ref 1.1–2)
ALP SERPL-CCNC: 69 UNIT/L (ref 40–150)
ALT SERPL-CCNC: 21 UNIT/L (ref 0–55)
APPEARANCE UR: CLEAR
AST SERPL-CCNC: 25 UNIT/L (ref 5–34)
BACTERIA #/AREA URNS AUTO: ABNORMAL /HPF
BASOPHILS # BLD AUTO: 0.02 X10(3)/MCL
BASOPHILS NFR BLD AUTO: 0.7 %
BILIRUB SERPL-MCNC: 0.7 MG/DL
BILIRUB UR QL STRIP.AUTO: NEGATIVE
BUN SERPL-MCNC: 5.9 MG/DL (ref 9.8–20.1)
CALCIUM SERPL-MCNC: 10.1 MG/DL (ref 8.4–10.2)
CHLORIDE SERPL-SCNC: 108 MMOL/L (ref 98–107)
CO2 SERPL-SCNC: 25 MMOL/L (ref 22–29)
COLOR UR AUTO: COLORLESS
CREAT SERPL-MCNC: 1 MG/DL (ref 0.55–1.02)
EOSINOPHIL # BLD AUTO: 0.02 X10(3)/MCL (ref 0–0.9)
EOSINOPHIL NFR BLD AUTO: 0.7 %
ERYTHROCYTE [DISTWIDTH] IN BLOOD BY AUTOMATED COUNT: 12.2 % (ref 11.5–17)
GFR SERPLBLD CREATININE-BSD FMLA CKD-EPI: >60 MLS/MIN/1.73/M2
GLOBULIN SER-MCNC: 3.4 GM/DL (ref 2.4–3.5)
GLUCOSE SERPL-MCNC: 93 MG/DL (ref 74–100)
GLUCOSE UR QL STRIP.AUTO: NORMAL
HCT VFR BLD AUTO: 40.6 % (ref 37–47)
HGB BLD-MCNC: 12.6 G/DL (ref 12–16)
IMM GRANULOCYTES # BLD AUTO: 0.01 X10(3)/MCL (ref 0–0.04)
IMM GRANULOCYTES NFR BLD AUTO: 0.3 %
KETONES UR QL STRIP.AUTO: NEGATIVE
LEUKOCYTE ESTERASE UR QL STRIP.AUTO: NEGATIVE
LIPASE SERPL-CCNC: 20 U/L
LYMPHOCYTES # BLD AUTO: 1.29 X10(3)/MCL (ref 0.6–4.6)
LYMPHOCYTES NFR BLD AUTO: 42.9 %
MCH RBC QN AUTO: 29.9 PG (ref 27–31)
MCHC RBC AUTO-ENTMCNC: 31 G/DL (ref 33–36)
MCV RBC AUTO: 96.2 FL (ref 80–94)
MONOCYTES # BLD AUTO: 0.32 X10(3)/MCL (ref 0.1–1.3)
MONOCYTES NFR BLD AUTO: 10.6 %
NEUTROPHILS # BLD AUTO: 1.35 X10(3)/MCL (ref 2.1–9.2)
NEUTROPHILS NFR BLD AUTO: 44.8 %
NITRITE UR QL STRIP.AUTO: NEGATIVE
NRBC BLD AUTO-RTO: 0 %
PH UR STRIP.AUTO: 5.5 [PH]
PLATELET # BLD AUTO: 183 X10(3)/MCL (ref 130–400)
PMV BLD AUTO: 11 FL (ref 7.4–10.4)
POTASSIUM SERPL-SCNC: 4.8 MMOL/L (ref 3.5–5.1)
PROT SERPL-MCNC: 7.8 GM/DL (ref 6.4–8.3)
PROT UR QL STRIP.AUTO: NEGATIVE
RBC # BLD AUTO: 4.22 X10(6)/MCL (ref 4.2–5.4)
RBC #/AREA URNS AUTO: ABNORMAL /HPF
RBC UR QL AUTO: NEGATIVE
SODIUM SERPL-SCNC: 139 MMOL/L (ref 136–145)
SP GR UR STRIP.AUTO: 1 (ref 1–1.03)
SQUAMOUS #/AREA URNS LPF: ABNORMAL /HPF
TROPONIN I SERPL-MCNC: <0.01 NG/ML (ref 0–0.04)
UROBILINOGEN UR STRIP-ACNC: NORMAL
WBC # SPEC AUTO: 3.01 X10(3)/MCL (ref 4.5–11.5)
WBC #/AREA URNS AUTO: ABNORMAL /HPF

## 2023-12-06 PROCEDURE — 80053 COMPREHEN METABOLIC PANEL: CPT | Performed by: PHYSICIAN ASSISTANT

## 2023-12-06 PROCEDURE — 84484 ASSAY OF TROPONIN QUANT: CPT | Performed by: PHYSICIAN ASSISTANT

## 2023-12-06 PROCEDURE — 81001 URINALYSIS AUTO W/SCOPE: CPT | Performed by: PHYSICIAN ASSISTANT

## 2023-12-06 PROCEDURE — 93010 ELECTROCARDIOGRAM REPORT: CPT | Mod: ,,, | Performed by: INTERNAL MEDICINE

## 2023-12-06 PROCEDURE — 99285 EMERGENCY DEPT VISIT HI MDM: CPT | Mod: 25

## 2023-12-06 PROCEDURE — 93010 EKG 12-LEAD: ICD-10-PCS | Mod: ,,, | Performed by: INTERNAL MEDICINE

## 2023-12-06 PROCEDURE — 25000003 PHARM REV CODE 250: Performed by: PHYSICIAN ASSISTANT

## 2023-12-06 PROCEDURE — 83690 ASSAY OF LIPASE: CPT | Performed by: PHYSICIAN ASSISTANT

## 2023-12-06 PROCEDURE — 85025 COMPLETE CBC W/AUTO DIFF WBC: CPT | Performed by: PHYSICIAN ASSISTANT

## 2023-12-06 PROCEDURE — 93005 ELECTROCARDIOGRAM TRACING: CPT

## 2023-12-06 RX ORDER — MAG HYDROX/ALUMINUM HYD/SIMETH 200-200-20
30 SUSPENSION, ORAL (FINAL DOSE FORM) ORAL ONCE
Status: COMPLETED | OUTPATIENT
Start: 2023-12-06 | End: 2023-12-06

## 2023-12-06 RX ORDER — ACETAMINOPHEN 500 MG
1000 TABLET ORAL
Status: COMPLETED | OUTPATIENT
Start: 2023-12-06 | End: 2023-12-06

## 2023-12-06 RX ORDER — SUCRALFATE 1 G/10ML
1 SUSPENSION ORAL
Qty: 560 ML | Refills: 0 | Status: SHIPPED | OUTPATIENT
Start: 2023-12-06 | End: 2023-12-20

## 2023-12-06 RX ADMIN — ACETAMINOPHEN 1000 MG: 500 TABLET ORAL at 02:12

## 2023-12-06 RX ADMIN — ALUMINUM HYDROXIDE, MAGNESIUM HYDROXIDE, AND SIMETHICONE 30 ML: 200; 200; 20 SUSPENSION ORAL at 01:12

## 2023-12-06 NOTE — DISCHARGE INSTRUCTIONS
Take carafe 30 min before each meal for the next week. Then take as needed. Follow up with PCP/ GI as needed.

## 2023-12-06 NOTE — FIRST PROVIDER EVALUATION
"Medical screening examination initiated.  I have conducted a focused provider triage encounter, findings are as follows:    Chief Complaint   Patient presents with    Heartburn    Nausea     C/O nausea, heartburn, loss of appetite x2 weeks. Seen recently for same but symptoms getting worse. Dc'd with carafate     Brief history of present illness:  52 y.o. female presents to the ED with GERD, nausea, and decreased appetite for the last 2 weeks. Seen here on Saturday and diagnosed with GERD however told to return to the ER.     Vitals:    12/06/23 1023   BP: (!) 174/77   Pulse: 77   Resp: 18   Temp: 98 °F (36.7 °C)   TempSrc: Oral   SpO2: 100%   Height: 5' 7" (1.702 m)     Pertinent physical exam:  Awake, alert, ambulatory, non-labored respirations    Brief workup plan:  labs, EKG, CXR    Preliminary workup initiated; this workup will be continued and followed by the physician or advanced practice provider that is assigned to the patient when roomed.  "

## 2023-12-06 NOTE — ED PROVIDER NOTES
Encounter Date: 12/6/2023       History     Chief Complaint   Patient presents with    Heartburn    Nausea     C/O nausea, heartburn, loss of appetite x2 weeks. Seen recently for same but symptoms getting worse. Dc'd with carafate but not taking bc she states she chokes when she takes the pills      52-year-old female presents to the ER for evaluation worsening heartburn x3 weeks.  Patient reports a chronic history of gastric reflux for which she takes Maalox and Protonix at home.States her home regimen is not controlling her pain.  She describes her epigastric pain as constant and varying in intensity especially after eating..  She reports that she is seen her primary care provider and GI specialist for this problem and she is been told to continue her Protonix.  Patient reports some intermittent nausea.  Denies any vomiting.  She denies any abdominal pain other than her epigastric burning pain.  She denies any changes in urinary frequency.  She denies any bowel disturbance.  She denies any fever or chills.  She denies any shortness of breath.  She denies any weakness.      The history is provided by the patient. No  was used.     Review of patient's allergies indicates:   Allergen Reactions    Ketorolac      Other reaction(s): SOB, Stops breathing    Prednisone Shortness Of Breath     Other reaction(s): SOB    Amoxicillin-pot clavulanate      Other reaction(s): Hives, RASH    Azithromycin      Other reaction(s): RASH    Ciprofloxacin      Other reaction(s): Hives, RASH    Clindamycin      Other reaction(s): Rash    Diphenoxylate-atropine      Other reaction(s): Hives, RASH    Hydrocodone-acetaminophen      Other reaction(s): Hives, hives, SWELLING, tongue swelling    Iodine      Other reaction(s): Difficulty breathing, Hives, SOB, Swelling    Penicillin      Other reaction(s): Hives, RASH    Shellfish containing products      Other reaction(s): Hives, RASH    Sulfamethoxazole-trimethoprim       Other reaction(s): Hives, RASH    Doxycycline     Zoloft [sertraline] Other (See Comments)     Serotonin syndrome      Past Medical History:   Diagnosis Date    Chronic ITP (idiopathic thrombocytopenia)     Crohn's disease     GERD (gastroesophageal reflux disease)     Hypertension     Leukopenia     Psychosomatic disorder     Severe anxiety      Past Surgical History:   Procedure Laterality Date    BONE MARROW BIOPSY  06/13/2016    DILATION AND CURETTAGE OF UTERUS  10/31/2018    PARTIAL HYSTERECTOMY       Family History   Problem Relation Age of Onset    Diabetes Mother     Hypertension Mother     Heart attack Mother     Heart disease Mother     Diabetes Father     Brain cancer Father     ALYSE disease Father     Heart disease Sister     Diabetes Brother      Social History     Tobacco Use    Smoking status: Never    Smokeless tobacco: Never   Substance Use Topics    Alcohol use: Never    Drug use: Never     Review of Systems    Physical Exam     Initial Vitals [12/06/23 1023]   BP Pulse Resp Temp SpO2   (!) 174/77 77 18 98 °F (36.7 °C) 100 %      MAP       --         Physical Exam    ED Course   Procedures  Labs Reviewed   COMPREHENSIVE METABOLIC PANEL - Abnormal; Notable for the following components:       Result Value    Chloride 108 (*)     Blood Urea Nitrogen 5.9 (*)     All other components within normal limits   CBC WITH DIFFERENTIAL - Abnormal; Notable for the following components:    WBC 3.01 (*)     MCV 96.2 (*)     MCHC 31.0 (*)     MPV 11.0 (*)     Neut # 1.35 (*)     All other components within normal limits   URINALYSIS, REFLEX TO URINE CULTURE - Abnormal; Notable for the following components:    Specific Gravity, UA 1.004 (*)     All other components within normal limits   LIPASE - Normal   TROPONIN I - Normal   CBC W/ AUTO DIFFERENTIAL    Narrative:     The following orders were created for panel order CBC auto differential.  Procedure                               Abnormality         Status                      ---------                               -----------         ------                     CBC with Differential[[id number]]       Abnormal            Final result                 Please view results for these tests on the individual orders.     EKG Readings: (Independently Interpreted)   Initial Reading: No STEMI. Rhythm: Normal Sinus Rhythm. Heart Rate: 60. Ectopy: No Ectopy. Conduction: Normal. ST Segments: Normal ST Segments. T Waves: Normal. Clinical Impression: Normal Sinus Rhythm Other Impression: NSR at rate of 60. No ischemia noted.       Imaging Results              X-Ray Chest 1 View (Final result)  Result time 12/06/23 10:51:20      Final result by Gerson Hull MD (12/06/23 10:51:20)                   Impression:      No acute chest disease is identified.      Electronically signed by: Gerson Hull  Date:    12/06/2023  Time:    10:51               Narrative:    EXAMINATION:  XR CHEST 1 VIEW    CLINICAL HISTORY:  , Functional dyspepsia.    COMPARISON:  December 1, 2023    FINDINGS:  No alveolar consolidation, effusion, or pneumothorax is seen.   The thoracic aorta is normal  cardiac silhouette, central pulmonary vessels and mediastinum are normal in size and are grossly unremarkable.   visualized osseous structures are grossly unremarkable.                                       Medications   aluminum-magnesium hydroxide-simethicone 200-200-20 mg/5 mL suspension 30 mL (30 mLs Oral Not Given 12/6/23 1430)   acetaminophen tablet 1,000 mg (1,000 mg Oral Given 12/6/23 1445)     Medical Decision Making  52-year-old female presents to the ER for evaluation worsening heartburn x3 weeks.  Patient reports a chronic history of gastric reflux for which she takes Maalox and Protonix at home.States her home regimen is not controlling her pain.  She describes her epigastric pain as constant and varying in intensity especially after eating..  She reports that she is seen her primary care provider and  GI specialist for this problem and she is been told to continue her Protonix.  Patient reports some intermittent nausea.  Denies any vomiting.  She denies any abdominal pain other than her epigastric burning pain.  She denies any changes in urinary frequency.  She denies any bowel disturbance.  She denies any fever or chills.  She denies any shortness of breath.  She denies any weakness.      Amount and/or Complexity of Data Reviewed  Labs: ordered.  ECG/medicine tests: ordered and independent interpretation performed.  Discussion of management or test interpretation with external provider(s):   Patient afebrile and in no acute distress.  Presents to ED for evaluation of epigastric burning sensation.  Patient states she was diagnosed with gastritis 3 days ago.  States that she took 1 Carafate with relief however gave her anxiety.  States she was told not to take another when but instead to come back to the emergency room if she started to feel the pain again.  Labs here unremarkable.  EKG showing normal sinus rhythm.  Discussed with patient possible gastritis.  Patient given Mylanta here with relief of symptoms.  Will discharge home with short course oral Carafate as patient is unable to tolerate the tab.  Discussed need for follow-up with PCP /GI for further evaluation.  Return ED precautions given.  Patient verbalizes understanding    Risk  OTC drugs.  Prescription drug management.                                      Clinical Impression:  Final diagnoses:  [K30] Indigestion (Primary)          ED Disposition Condition    Discharge Stable          ED Prescriptions       Medication Sig Dispense Start Date End Date Auth. Provider    sucralfate (CARAFATE) 100 mg/mL suspension Take 10 mLs (1 g total) by mouth 4 (four) times daily before meals and nightly. for 14 days 560 mL 12/6/2023 12/20/2023 Komal Marcial PA          Follow-up Information       Follow up With Specialties Details Why Contact Info    Mike Tuttle,  MD Family Medicine   [address] [place] [address]  [phone]      Jeremias Ma MD Gastroenterology Call   [address]  [place] [address]  [phone]               Komal Marcial PA  12/06/23 6794

## 2024-01-21 ENCOUNTER — HOSPITAL ENCOUNTER (EMERGENCY)
Facility: HOSPITAL | Age: 54
Discharge: HOME | End: 2024-01-21
Attending: EMERGENCY MEDICINE
Payer: MEDICARE

## 2024-01-21 VITALS
SYSTOLIC BLOOD PRESSURE: 170 MMHG | WEIGHT: 125 LBS | TEMPERATURE: 98 F | OXYGEN SATURATION: 98 % | RESPIRATION RATE: 18 BRPM | HEIGHT: 67 IN | BODY MASS INDEX: 19.62 KG/M2 | DIASTOLIC BLOOD PRESSURE: 88 MMHG | HEART RATE: 70 BPM

## 2024-01-21 DIAGNOSIS — R07.9 CHEST PAIN: ICD-10-CM

## 2024-01-21 DIAGNOSIS — F41.0 PANIC ANXIETY SYNDROME: Primary | ICD-10-CM

## 2024-01-21 DIAGNOSIS — K29.70 GASTRITIS, PRESENCE OF BLEEDING UNSPECIFIED, UNSPECIFIED CHRONICITY, UNSPECIFIED GASTRITIS TYPE: ICD-10-CM

## 2024-01-21 DIAGNOSIS — R07.89 NON-CARDIAC CHEST PAIN: ICD-10-CM

## 2024-01-21 LAB
ALBUMIN SERPL-MCNC: 4.1 G/DL (ref 3.5–5)
ALBUMIN/GLOB SERPL: 1.2 RATIO (ref 1.1–2)
ALP SERPL-CCNC: 65 UNIT/L (ref 40–150)
ALT SERPL-CCNC: 20 UNIT/L (ref 0–55)
AST SERPL-CCNC: 24 UNIT/L (ref 5–34)
BASOPHILS # BLD AUTO: 0.02 X10(3)/MCL
BASOPHILS NFR BLD AUTO: 0.8 %
BILIRUB SERPL-MCNC: 0.6 MG/DL
BUN SERPL-MCNC: 7 MG/DL (ref 9.8–20.1)
CALCIUM SERPL-MCNC: 9.5 MG/DL (ref 8.4–10.2)
CHLORIDE SERPL-SCNC: 106 MMOL/L (ref 98–107)
CO2 SERPL-SCNC: 26 MMOL/L (ref 22–29)
CREAT SERPL-MCNC: 1.01 MG/DL (ref 0.55–1.02)
EOSINOPHIL # BLD AUTO: 0.03 X10(3)/MCL (ref 0–0.9)
EOSINOPHIL NFR BLD AUTO: 1.2 %
ERYTHROCYTE [DISTWIDTH] IN BLOOD BY AUTOMATED COUNT: 12.5 % (ref 11.5–17)
GFR SERPLBLD CREATININE-BSD FMLA CKD-EPI: >60 MLS/MIN/1.73/M2
GLOBULIN SER-MCNC: 3.4 GM/DL (ref 2.4–3.5)
GLUCOSE SERPL-MCNC: 100 MG/DL (ref 74–100)
HCT VFR BLD AUTO: 39.8 % (ref 37–47)
HGB BLD-MCNC: 12.2 G/DL (ref 12–16)
IMM GRANULOCYTES # BLD AUTO: 0 X10(3)/MCL (ref 0–0.04)
IMM GRANULOCYTES NFR BLD AUTO: 0 %
LIPASE SERPL-CCNC: 22 U/L
LYMPHOCYTES # BLD AUTO: 1.36 X10(3)/MCL (ref 0.6–4.6)
LYMPHOCYTES NFR BLD AUTO: 52.3 %
MCH RBC QN AUTO: 29 PG (ref 27–31)
MCHC RBC AUTO-ENTMCNC: 30.7 G/DL (ref 33–36)
MCV RBC AUTO: 94.8 FL (ref 80–94)
MONOCYTES # BLD AUTO: 0.32 X10(3)/MCL (ref 0.1–1.3)
MONOCYTES NFR BLD AUTO: 12.3 %
NEUTROPHILS # BLD AUTO: 0.87 X10(3)/MCL (ref 2.1–9.2)
NEUTROPHILS NFR BLD AUTO: 33.4 %
PLATELET # BLD AUTO: 183 X10(3)/MCL (ref 130–400)
PMV BLD AUTO: 11.1 FL (ref 7.4–10.4)
POTASSIUM SERPL-SCNC: 3.9 MMOL/L (ref 3.5–5.1)
PROT SERPL-MCNC: 7.5 GM/DL (ref 6.4–8.3)
RBC # BLD AUTO: 4.2 X10(6)/MCL (ref 4.2–5.4)
SODIUM SERPL-SCNC: 140 MMOL/L (ref 136–145)
TROPONIN I SERPL-MCNC: <0.01 NG/ML (ref 0–0.04)
WBC # SPEC AUTO: 2.6 X10(3)/MCL (ref 4.5–11.5)

## 2024-01-21 PROCEDURE — 83690 ASSAY OF LIPASE: CPT | Performed by: EMERGENCY MEDICINE

## 2024-01-21 PROCEDURE — 99285 EMERGENCY DEPT VISIT HI MDM: CPT | Mod: 25

## 2024-01-21 PROCEDURE — 84484 ASSAY OF TROPONIN QUANT: CPT | Performed by: EMERGENCY MEDICINE

## 2024-01-21 PROCEDURE — 93005 ELECTROCARDIOGRAM TRACING: CPT

## 2024-01-21 PROCEDURE — 93010 ELECTROCARDIOGRAM REPORT: CPT | Mod: ,,, | Performed by: STUDENT IN AN ORGANIZED HEALTH CARE EDUCATION/TRAINING PROGRAM

## 2024-01-21 PROCEDURE — 85025 COMPLETE CBC W/AUTO DIFF WBC: CPT | Performed by: EMERGENCY MEDICINE

## 2024-01-21 PROCEDURE — 80053 COMPREHEN METABOLIC PANEL: CPT | Performed by: EMERGENCY MEDICINE

## 2024-01-21 RX ORDER — PANTOPRAZOLE SODIUM 40 MG/1
40 TABLET, DELAYED RELEASE ORAL DAILY
Qty: 30 TABLET | Refills: 0 | Status: SHIPPED | OUTPATIENT
Start: 2024-01-21 | End: 2024-05-14

## 2024-01-21 NOTE — ED PROVIDER NOTES
Encounter Date: 1/21/2024       History     Chief Complaint   Patient presents with    Chest Pain     C/o chest pain x2 weeks. States she feels like this is similar to indigestion. Reports she has had difficulty eating as well. States she was dx with gastritis in the past.     The patient presents with chest pain.  She has had a long history of GERD and acid reflux for which she has been seeing a GI doctor and has been on a variety of medications.  She also has anxiety and takes clonazepam.  She had an episode today and with a worried that it may be her heart.  She called her GI doctor and was sent to the ER.  She feels somewhat better now but she still has a dull ache in the chest.        Review of patient's allergies indicates:   Allergen Reactions    Ketorolac      Other reaction(s): SOB, Stops breathing    Prednisone Shortness Of Breath     Other reaction(s): SOB    Amoxicillin-pot clavulanate      Other reaction(s): Hives, RASH    Azithromycin      Other reaction(s): RASH    Ciprofloxacin      Other reaction(s): Hives, RASH    Clindamycin      Other reaction(s): Rash    Diphenoxylate-atropine      Other reaction(s): Hives, RASH    Hydrocodone-acetaminophen      Other reaction(s): Hives, hives, SWELLING, tongue swelling    Iodine      Other reaction(s): Difficulty breathing, Hives, SOB, Swelling    Penicillin      Other reaction(s): Hives, RASH    Shellfish containing products      Other reaction(s): Hives, RASH    Sulfamethoxazole-trimethoprim      Other reaction(s): Hives, RASH    Doxycycline     Zoloft [sertraline] Other (See Comments)     Serotonin syndrome      Past Medical History:   Diagnosis Date    Chronic ITP (idiopathic thrombocytopenia)     Crohn's disease     GERD (gastroesophageal reflux disease)     Hypertension     Leukopenia     Psychosomatic disorder     Severe anxiety      Past Surgical History:   Procedure Laterality Date    BONE MARROW BIOPSY  06/13/2016    DILATION AND CURETTAGE OF UTERUS   10/31/2018    PARTIAL HYSTERECTOMY       Family History   Problem Relation Age of Onset    Diabetes Mother     Hypertension Mother     Heart attack Mother     Heart disease Mother     Diabetes Father     Brain cancer Father     ALYSE disease Father     Heart disease Sister     Diabetes Brother      Social History     Tobacco Use    Smoking status: Never    Smokeless tobacco: Never   Substance Use Topics    Alcohol use: Never    Drug use: Never     Review of Systems   All other systems reviewed and are negative.      Physical Exam     Initial Vitals [01/21/24 1610]   BP Pulse Resp Temp SpO2   (!) 188/91 66 18 98.3 °F (36.8 °C) 95 %      MAP       --         Physical Exam    Constitutional:   Vital signs reviewed.  Nursing note reviewed.    General:  The patient is awake and alert, appropriate and interactive.    Eyes: Conjunctiva are clear.  Corneas appear normal.  EOMI.  ENT: Face, head, external nose, and ears are normal-appearing.  The oropharynx appears normal.  The uvula is midline.  The posterior pharyngeal elements are symmetric.  Voice is normal.  Neck: The neck is nontender and supple with normal range of motion.  Back: The back appears normal with full range of motion.  Respiratory: The respiratory effort is normal.  The lungs are clear to auscultation.  Cardiovascular: Heart sounds are normal.  No murmur or rub.  The rate is normal.  The rhythm is normal.  Peripheral pulses are normal and equal bilaterally.  No edema is present.  Capillary refill is less than 3 seconds.  GI: The abdomen is soft, nondistended, without mass.  There is no tenderness to palpation.  No rebound or guarding.  Bowel sounds are normal.  The liver and spleen are nonpalpable.  Musculoskeletal: Range of motion of all joints appears normal without pain or tenderness.  Skin: There is no rash.  Neurologic: No focal deficits are noted.           ED Course   Procedures  Labs Reviewed   COMPREHENSIVE METABOLIC PANEL - Abnormal; Notable for  the following components:       Result Value    Blood Urea Nitrogen 7.0 (*)     All other components within normal limits   CBC WITH DIFFERENTIAL - Abnormal; Notable for the following components:    WBC 2.60 (*)     MCV 94.8 (*)     MCHC 30.7 (*)     MPV 11.1 (*)     Neut # 0.87 (*)     All other components within normal limits   LIPASE - Normal   TROPONIN I - Normal   CBC W/ AUTO DIFFERENTIAL    Narrative:     The following orders were created for panel order CBC auto differential.  Procedure                               Abnormality         Status                     ---------                               -----------         ------                     CBC with Differential[[id number]]       Abnormal            Final result                 Please view results for these tests on the individual orders.     EKG Readings: (Independently Interpreted)   EKG done at 4:11 p.m. on January 21, 2024 shows sinus bradycardia ventricular rate is 58 beats per minute     ECG Results              EKG 12-lead (In process)  Result time 01/21/24 16:39:09      In process by Interface, Lab In [facility] (01/21/24 16:39:09)                   Narrative:    Test Reason : R07.9,    Vent. Rate : 058 BPM     Atrial Rate : 058 BPM     P-R Int : 174 ms          QRS Dur : 084 ms      QT Int : 404 ms       P-R-T Axes : 084 087 081 degrees     QTc Int : 396 ms    Sinus bradycardia  Septal infarct ,age undetermined  Abnormal ECG  When compared with ECG of 06-DEC-2023 10:23,  No significant change was found    Referred By: [email or web address]   SELF           Confirmed By:                                   Imaging Results              X-Ray Chest 1 View (Final result)  Result time 01/21/24 18:27:53      Final result by Cordell Vance MD (01/21/24 18:27:53)                   Impression:      No acute cardiopulmonary process.      Electronically signed by: Cordell Vance  Date:    01/21/2024  Time:    18:27               Narrative:    EXAMINATION:  XR CHEST 1  VIEW    CLINICAL HISTORY:  Chest pain, unspecified    TECHNIQUE:  Single view of the chest    COMPARISON:  12/06/2023    FINDINGS:  No focal opacification, pleural effusion, or pneumothorax.    The cardiomediastinal silhouette is within normal limits.    No acute osseous abnormality.                                       Medications - No data to display  Medical Decision Making  EKG: Sinus bradycardia with a rate of 58 beats per minute.  Normal axis.  Normal intervals.  No ST segment or T-wave changes.      Care transitioned to Dr. Aguillon at 1800.    JOSHUA PROCTOR MD, assumed care at 1800.  Agree with above care plan and documentation.   Patient was extremely anxious and her cardiac workup is negative.  She does report that her GI told her she had pretty severe gastritis and currently she is out of her proton pump inhibitor.  I offered her a GI cocktail and she states it reacts with her anxiety medicine makes her more anxious which I explained to her does not make physiologic since however I am not going to force her to take something and at that point she asked what I was going to prescribe her for her pain and I explained to her I just offered her something and she refused the.  Will send a script Protonix and she can follow up with the primary care    Problems Addressed:  Chest pain: self-limited or minor problem  Gastritis, presence of bleeding unspecified, unspecified chronicity, unspecified gastritis type: chronic illness or injury  Non-cardiac chest pain: acute illness or injury  Panic anxiety syndrome: chronic illness or injury with severe exacerbation, progression, or side effects of treatment    Amount and/or Complexity of Data Reviewed  External Data Reviewed: labs and notes.  Labs: ordered. Decision-making details documented in ED Course.  Radiology: ordered and independent interpretation performed. Decision-making details documented in ED Course.  ECG/medicine tests: ordered and independent  interpretation performed. Decision-making details documented in ED Course.                                      Clinical Impression:  Final diagnoses:  [R07.9] Chest pain  [F41.0] Panic anxiety syndrome (Primary)  [R07.89] Non-cardiac chest pain  [K29.70] Gastritis, presence of bleeding unspecified, unspecified chronicity, unspecified gastritis type          ED Disposition Condition    Discharge Stable          ED Prescriptions       Medication Sig Dispense Start Date End Date Auth. Provider    pantoprazole (PROTONIX) 40 MG tablet Take 1 tablet (40 mg total) by mouth once daily. 30 tablet 1/21/2024 2/20/2024 Jayce Deluna MD          Follow-up Information       Follow up With Specialties Details Why Contact Info    Mike Tuttle MD Family Medicine In 3 days  PO   [facility] [id number]  [phone]            Nupur Austyn is a certified MA and was present during the entire interaction with this patient      Jayce Deluna MD  01/21/24 [id number]

## 2024-05-14 ENCOUNTER — OFFICE VISIT (OUTPATIENT)
Dept: HEMATOLOGY/ONCOLOGY | Facility: CLINIC | Age: 54
End: 2024-05-14
Payer: MEDICARE

## 2024-05-14 ENCOUNTER — LAB VISIT (OUTPATIENT)
Dept: LAB | Facility: HOSPITAL | Age: 54
End: 2024-05-14
Attending: INTERNAL MEDICINE
Payer: MEDICARE

## 2024-05-14 VITALS
HEIGHT: 67 IN | OXYGEN SATURATION: 100 % | DIASTOLIC BLOOD PRESSURE: 69 MMHG | RESPIRATION RATE: 17 BRPM | WEIGHT: 118.5 LBS | SYSTOLIC BLOOD PRESSURE: 153 MMHG | TEMPERATURE: 98 F | BODY MASS INDEX: 18.6 KG/M2 | HEART RATE: 69 BPM

## 2024-05-14 DIAGNOSIS — E46 MALNUTRITION, UNSPECIFIED TYPE: ICD-10-CM

## 2024-05-14 DIAGNOSIS — R63.4 UNINTENTIONAL WEIGHT LOSS: ICD-10-CM

## 2024-05-14 DIAGNOSIS — D69.3 CHRONIC IDIOPATHIC THROMBOCYTOPENIC PURPURA: ICD-10-CM

## 2024-05-14 DIAGNOSIS — K21.9 GASTROESOPHAGEAL REFLUX DISEASE, UNSPECIFIED WHETHER ESOPHAGITIS PRESENT: ICD-10-CM

## 2024-05-14 DIAGNOSIS — D72.819 LEUKOPENIA, UNSPECIFIED TYPE: ICD-10-CM

## 2024-05-14 DIAGNOSIS — D69.3 CHRONIC IDIOPATHIC THROMBOCYTOPENIC PURPURA: Primary | ICD-10-CM

## 2024-05-14 LAB
ALBUMIN SERPL-MCNC: 4.3 G/DL (ref 3.5–5)
ALBUMIN/GLOB SERPL: 1.3 RATIO (ref 1.1–2)
ALP SERPL-CCNC: 59 UNIT/L (ref 40–150)
ALT SERPL-CCNC: 18 UNIT/L (ref 0–55)
AST SERPL-CCNC: 23 UNIT/L (ref 5–34)
BASOPHILS # BLD AUTO: 0.03 X10(3)/MCL
BASOPHILS NFR BLD AUTO: 1.2 %
BILIRUB SERPL-MCNC: 0.7 MG/DL
BUN SERPL-MCNC: 8 MG/DL (ref 9.8–20.1)
CALCIUM SERPL-MCNC: 9.2 MG/DL (ref 8.4–10.2)
CHLORIDE SERPL-SCNC: 105 MMOL/L (ref 98–107)
CO2 SERPL-SCNC: 28 MMOL/L (ref 22–29)
CREAT SERPL-MCNC: 1.09 MG/DL (ref 0.55–1.02)
EOSINOPHIL # BLD AUTO: 0.02 X10(3)/MCL (ref 0–0.9)
EOSINOPHIL NFR BLD AUTO: 0.8 %
ERYTHROCYTE [DISTWIDTH] IN BLOOD BY AUTOMATED COUNT: 12.1 % (ref 11.5–17)
GFR SERPLBLD CREATININE-BSD FMLA CKD-EPI: 60 ML/MIN/1.73/M2
GLOBULIN SER-MCNC: 3.3 GM/DL (ref 2.4–3.5)
GLUCOSE SERPL-MCNC: 105 MG/DL (ref 74–100)
HCT VFR BLD AUTO: 41.4 % (ref 37–47)
HGB BLD-MCNC: 12.8 G/DL (ref 12–16)
IMM GRANULOCYTES # BLD AUTO: 0 X10(3)/MCL (ref 0–0.04)
IMM GRANULOCYTES NFR BLD AUTO: 0 %
LYMPHOCYTES # BLD AUTO: 1.22 X10(3)/MCL (ref 0.6–4.6)
LYMPHOCYTES NFR BLD AUTO: 50.4 %
MCH RBC QN AUTO: 29.1 PG (ref 27–31)
MCHC RBC AUTO-ENTMCNC: 30.9 G/DL (ref 33–36)
MCV RBC AUTO: 94.1 FL (ref 80–94)
MONOCYTES # BLD AUTO: 0.17 X10(3)/MCL (ref 0.1–1.3)
MONOCYTES NFR BLD AUTO: 7 %
NEUTROPHILS # BLD AUTO: 0.98 X10(3)/MCL (ref 2.1–9.2)
NEUTROPHILS NFR BLD AUTO: 40.6 %
PLATELET # BLD AUTO: 153 X10(3)/MCL (ref 130–400)
PMV BLD AUTO: 11.2 FL (ref 7.4–10.4)
POTASSIUM SERPL-SCNC: 3.8 MMOL/L (ref 3.5–5.1)
PROT SERPL-MCNC: 7.6 GM/DL (ref 6.4–8.3)
RBC # BLD AUTO: 4.4 X10(6)/MCL (ref 4.2–5.4)
SODIUM SERPL-SCNC: 139 MMOL/L (ref 136–145)
WBC # SPEC AUTO: 2.42 X10(3)/MCL (ref 4.5–11.5)

## 2024-05-14 PROCEDURE — 36415 COLL VENOUS BLD VENIPUNCTURE: CPT

## 2024-05-14 PROCEDURE — 80053 COMPREHEN METABOLIC PANEL: CPT

## 2024-05-14 PROCEDURE — 85025 COMPLETE CBC W/AUTO DIFF WBC: CPT

## 2024-05-14 PROCEDURE — 99214 OFFICE O/P EST MOD 30 MIN: CPT | Mod: PBBFAC

## 2024-05-14 PROCEDURE — 99214 OFFICE O/P EST MOD 30 MIN: CPT | Mod: S$PBB,,,

## 2024-05-14 PROCEDURE — 99999 PR PBB SHADOW E&M-EST. PATIENT-LVL IV: CPT | Mod: PBBFAC,,,

## 2024-05-14 NOTE — PROGRESS NOTES
Subjective:       Patient ID: Aleyda Peacock is a 53 y.o. female.    Chief Complaint: 4 Month Follow Up      Diagnosis:  Chronic ITP-- stable plts.    Dysregulated menstrual bleeding--much improved  Mild Leukopenia- chronic.   Crohns Disease--  managed per GI    Current Treatment:   Observation--secondary to stable platelet count since July 2016 (she has not required Nplate since that time)    Treatment History:  Steroids (prednisone)  Promacta 25 mg by mouth daily, mid-September 2016 x ~2-3 weeks. Discontinued secondary to loss of appetite generalized body aches UTI and leg pain.  N-plate given every other week.   5-7mcg/kg --started May 2012-->attempted to transition patient to Promacta in September 2016. She could not tolerate medication and after ~ 2-3 weeks was changed back N-plate.  N-plate to be held if platelet count greater than 150,000     HPI:  ALEYDA PEACOCK is a 52 years old female with a recurrent history of menorrhagia and polymenorrhea as well as recurrent to bruising. She has a chronic thrombocytopenia presumably secondary to chronic ITP. This was diagnosed more than 5 years ago. She was managed conservatively for the most part with well without significant bleeding or complications. As she had extensive workup in the breast that was largely unrevealing a part from a positive CMV antibody and the positive EBV antibody. She has been on steroids at 60 mg P.O. q. daily for several months and there was gradually tapered down to 20 mg a day. She had an initial response to steroids but later on her clinic chart went down to the range between 40-70. She gives a strong family history of that her sister and her mom have very similar symptoms with the thrombocytopenia easy bruising and menorrhagia. She had workup for von Willebrand disease at twice and was negative. There is a possibility of a rare familial thrombocytopenia but in her prior bone marrow which she had twice in 2007 and 2009 cytogenetics  showed a normal female chromosomes 46xx. she has been seeing Dr. Mendoza at the [facility] a more over the past couple years and now she presents to us for further evaluation and management.      BM aspiration/Biopsy 2007 showed normocellular marrow with 50% cellularity. no significant dysplasia. megakaryocytes are present and mature. no evidence of an abnormal infiltrate. FLOW: failed to show any abnormal cell population.  Cytogenetics: 46 XX.  Repeat on BM aspiration/biopsy 2009 showed active erythroid and myelopoiesis, peripheral consumption or sequestration should be considered.       Started on Nplate 5/7/2012 with initially good response, which then leveled off at a dose of 1 mcg per kilogram. She was subsequently titrated upwards in her dosing and is now at 4 mcg per kilogram every other week with good response.    She had a drop in platelets and dose was increased to 5 mcg per kilogram every 2 weeks.  Her dose was then increased to 7 mcg per kilogram every 2 weeks on 10/21/2013     Patient underwent bone marrow biopsy and aspirate on June 13, 2016 out of concern/surveillance while on N-plate to ensure no transformation to myelodysplastic syndrome. Bone marrow biopsy was done without event and showed normocellular hematopoiesis with no evidence of myelodysplasia or dysmorphic megakaryocytes.  In mid September 2016, we attempted to transition patient to Promacta. She could not tolerate medication and after ~ 2-3 weeks was changed back N-plate.  She last received Nplate in July 2016 with no further dosing required secondary to platelet counts being greater than 200,000 consistently     Interval History:   Patient is here today for a follow-up for chronic ITP.  From a hematology standpoint, she is doing well.  Labs reviewed in detail with patient, WBC 2.42, Hgb 12.8, Platelet 153. She is using vaginal estrogen and denies any frequent UTIs or vaginal infections. She denies any bleeding or bruising.  She  denies any recurrent or high fevers.  No recent infections or hospitalizations. She has concerns about her poor intake.  She states that she is unable to tolerate many foods and was previously on a plant based nondairy supplements that helped her.  She is requesting a dietician referral.     .   Past Medical History:   Diagnosis Date    Chronic ITP (idiopathic thrombocytopenia)     Crohn's disease     GERD (gastroesophageal reflux disease)     Hypertension     Leukopenia     Psychosomatic disorder     Severe anxiety       Past Surgical History:   Procedure Laterality Date    BONE MARROW BIOPSY  06/13/2016    DILATION AND CURETTAGE OF UTERUS  10/31/2018    PARTIAL HYSTERECTOMY       Social History     Socioeconomic History    Marital status: Single   Tobacco Use    Smoking status: Never    Smokeless tobacco: Never   Substance and Sexual Activity    Alcohol use: Never    Drug use: Never      Family History   Problem Relation Name Age of Onset    Diabetes Mother      Hypertension Mother      Heart attack Mother      Heart disease Mother      Diabetes Father      Brain cancer Father      ALYSE disease Father      Heart disease Sister      Diabetes Brother        Review of patient's allergies indicates:   Allergen Reactions    Ketorolac      Other reaction(s): SOB, Stops breathing    Prednisone Shortness Of Breath     Other reaction(s): SOB    Amoxicillin-pot clavulanate      Other reaction(s): Hives, RASH    Azithromycin      Other reaction(s): RASH    Ciprofloxacin      Other reaction(s): Hives, RASH    Clindamycin      Other reaction(s): Rash    Diphenoxylate-atropine      Other reaction(s): Hives, RASH    Hydrocodone-acetaminophen      Other reaction(s): Hives, hives, SWELLING, tongue swelling    Iodine      Other reaction(s): Difficulty breathing, Hives, SOB, Swelling    Penicillin      Other reaction(s): Hives, RASH    Shellfish containing products      Other reaction(s): Hives, RASH    Sulfamethoxazole-trimethoprim       Other reaction(s): Hives, RASH    Acetaminophen      Hives    Atropine      Hives    Cefuroxime      Hives    Clonazepam Hives    Diphenoxylate      Hives    Doxycycline     Famotidine      Other Reaction(s): Unknown    Hydrocodone      Hives    Povidone-iodine      Other Reaction(s): Unknown    Sulfamethoxazole      Hives    Trimethoprim      Hives    Zoloft [sertraline] Other (See Comments)     Serotonin syndrome       Review of Systems   Constitutional:  Negative for activity change, appetite change, chills, diaphoresis, fatigue, fever and unexpected weight change.   Eyes:  Negative for visual disturbance.   Respiratory:  Negative for shortness of breath.    Cardiovascular:  Negative for chest pain and leg swelling.   Gastrointestinal:  Negative for abdominal pain, anal bleeding, blood in stool, change in bowel habit, constipation, diarrhea, nausea and vomiting.   Genitourinary:  Negative for hematuria, menstrual problem and vaginal bleeding.   Allergic/Immunologic: Negative for immunocompromised state.   Neurological:  Positive for headaches.         Objective:      Physical Exam  Vitals reviewed.   Constitutional:       General: She is awake. She is not in acute distress.     Appearance: Normal appearance. She is well-groomed.   HENT:      Head: Normocephalic.   Eyes:      General: Lids are normal. No scleral icterus.     Extraocular Movements: Extraocular movements intact.      Conjunctiva/sclera: Conjunctivae normal.   Cardiovascular:      Rate and Rhythm: Normal rate and regular rhythm.   Pulmonary:      Effort: Pulmonary effort is normal.      Breath sounds: Normal breath sounds.   Chest:      Chest wall: No tenderness.   Musculoskeletal:         General: No swelling, tenderness or deformity.      Cervical back: Full passive range of motion without pain and neck supple. No tenderness.      Right lower leg: No edema.      Left lower leg: No edema.   Skin:     General: Skin is warm and dry.       Coloration: Skin is not jaundiced or pale.   Neurological:      General: No focal deficit present.      Mental Status: She is alert and oriented to person, place, and time.      Motor: No weakness.      Gait: Gait is intact.   Psychiatric:         Mood and Affect: Mood normal.         Speech: Speech normal.         Behavior: Behavior normal. Behavior is cooperative.         LABS REVIEWED IN [facility]          Assessment:   Chronic ITP-- stable, off of N-plate and on observation  Chronic mild Leukopenia-- stable  Crohn's disease-- managed per GI  Weightloss/anorexia-- managed per GI      Plan:       From a platelet standpoint she is doing very well.  Platelet count today is 153,000.     Continue to follow up with Immunologist as scheduled     Continue to follow up with Dermatologist as scheduled    Discuss possible bone marrow biopsy due to her decrease in white blood cell count, she declines at this time.    Her biggest concern is seeing a dietitian and getting nutritional supplements that she can tolerate.  Will refer to outpatient nutrition services      RTC in 4 months for OV and clinical examination     Labs: CBC, CMP     All questions were answered to the best of my ability.    Rochelle Burr, LORENP-C  Oncology/Hematology  [facility]

## 2024-07-10 DIAGNOSIS — D72.819 LEUKOPENIA, UNSPECIFIED TYPE: ICD-10-CM

## 2024-07-10 DIAGNOSIS — R63.4 UNINTENTIONAL WEIGHT LOSS: Primary | ICD-10-CM

## 2024-07-11 ENCOUNTER — TELEPHONE (OUTPATIENT)
Dept: HEMATOLOGY/ONCOLOGY | Facility: CLINIC | Age: 54
End: 2024-07-11
Payer: MEDICARE

## 2024-07-11 ENCOUNTER — HOSPITAL ENCOUNTER (EMERGENCY)
Facility: HOSPITAL | Age: 54
Discharge: HOME | End: 2024-07-11
Attending: EMERGENCY MEDICINE
Payer: MEDICARE

## 2024-07-11 VITALS
RESPIRATION RATE: 19 BRPM | HEART RATE: 60 BPM | HEIGHT: 66 IN | DIASTOLIC BLOOD PRESSURE: 78 MMHG | WEIGHT: 125 LBS | TEMPERATURE: 98 F | BODY MASS INDEX: 20.09 KG/M2 | OXYGEN SATURATION: 100 % | SYSTOLIC BLOOD PRESSURE: 177 MMHG

## 2024-07-11 DIAGNOSIS — N39.0 URINARY TRACT INFECTION WITHOUT HEMATURIA, SITE UNSPECIFIED: Primary | ICD-10-CM

## 2024-07-11 LAB
ALBUMIN SERPL-MCNC: 4.2 G/DL (ref 3.5–5)
ALBUMIN/GLOB SERPL: 1.2 RATIO (ref 1.1–2)
ALP SERPL-CCNC: 49 UNIT/L (ref 40–150)
ALT SERPL-CCNC: 19 UNIT/L (ref 0–55)
ANION GAP SERPL CALC-SCNC: 8 MEQ/L
APTT PPP: 30.4 SECONDS (ref 23.2–33.7)
AST SERPL-CCNC: 22 UNIT/L (ref 5–34)
BACTERIA #/AREA URNS AUTO: ABNORMAL /HPF
BASOPHILS # BLD AUTO: 0.02 X10(3)/MCL
BASOPHILS NFR BLD AUTO: 0.8 %
BILIRUB SERPL-MCNC: 0.8 MG/DL
BILIRUB UR QL STRIP.AUTO: NEGATIVE
BUN SERPL-MCNC: 9.9 MG/DL (ref 9.8–20.1)
CALCIUM SERPL-MCNC: 10.2 MG/DL (ref 8.4–10.2)
CHLORIDE SERPL-SCNC: 106 MMOL/L (ref 98–107)
CLARITY UR: ABNORMAL
CO2 SERPL-SCNC: 23 MMOL/L (ref 22–29)
COLOR UR AUTO: ABNORMAL
CREAT SERPL-MCNC: 1.09 MG/DL (ref 0.55–1.02)
CREAT/UREA NIT SERPL: 9
EOSINOPHIL # BLD AUTO: 0.01 X10(3)/MCL (ref 0–0.9)
EOSINOPHIL NFR BLD AUTO: 0.4 %
ERYTHROCYTE [DISTWIDTH] IN BLOOD BY AUTOMATED COUNT: 12.9 % (ref 11.5–17)
GFR SERPLBLD CREATININE-BSD FMLA CKD-EPI: 60 ML/MIN/1.73/M2
GLOBULIN SER-MCNC: 3.4 GM/DL (ref 2.4–3.5)
GLUCOSE SERPL-MCNC: 97 MG/DL (ref 74–100)
GLUCOSE UR QL STRIP: NORMAL
HCT VFR BLD AUTO: 39.9 % (ref 37–47)
HGB BLD-MCNC: 12.5 G/DL (ref 12–16)
HGB UR QL STRIP: ABNORMAL
IMM GRANULOCYTES # BLD AUTO: 0.01 X10(3)/MCL (ref 0–0.04)
IMM GRANULOCYTES NFR BLD AUTO: 0.4 %
INR PPP: 1
KETONES UR QL STRIP: NEGATIVE
LEUKOCYTE ESTERASE UR QL STRIP: NEGATIVE
LYMPHOCYTES # BLD AUTO: 0.94 X10(3)/MCL (ref 0.6–4.6)
LYMPHOCYTES NFR BLD AUTO: 38.8 %
MCH RBC QN AUTO: 29.3 PG (ref 27–31)
MCHC RBC AUTO-ENTMCNC: 31.3 G/DL (ref 33–36)
MCV RBC AUTO: 93.4 FL (ref 80–94)
MONOCYTES # BLD AUTO: 0.16 X10(3)/MCL (ref 0.1–1.3)
MONOCYTES NFR BLD AUTO: 6.6 %
MUCOUS THREADS URNS QL MICRO: ABNORMAL /LPF
NEUTROPHILS # BLD AUTO: 1.28 X10(3)/MCL (ref 2.1–9.2)
NEUTROPHILS NFR BLD AUTO: 53 %
NITRITE UR QL STRIP: NEGATIVE
NRBC BLD AUTO-RTO: 0 %
PH UR STRIP: 5.5 [PH]
PLATELET # BLD AUTO: 147 X10(3)/MCL (ref 130–400)
PMV BLD AUTO: 11.4 FL (ref 7.4–10.4)
POTASSIUM SERPL-SCNC: 4.2 MMOL/L (ref 3.5–5.1)
PROT SERPL-MCNC: 7.6 GM/DL (ref 6.4–8.3)
PROT UR QL STRIP: NEGATIVE
PROTHROMBIN TIME: 13.7 SECONDS (ref 12.5–14.5)
RBC # BLD AUTO: 4.27 X10(6)/MCL (ref 4.2–5.4)
RBC #/AREA URNS AUTO: ABNORMAL /HPF
SODIUM SERPL-SCNC: 137 MMOL/L (ref 136–145)
SP GR UR STRIP.AUTO: 1.01 (ref 1–1.03)
SQUAMOUS #/AREA URNS LPF: ABNORMAL /HPF
UROBILINOGEN UR STRIP-ACNC: NORMAL
WBC # BLD AUTO: 2.42 X10(3)/MCL (ref 4.5–11.5)
WBC #/AREA URNS AUTO: ABNORMAL /HPF

## 2024-07-11 PROCEDURE — 85610 PROTHROMBIN TIME: CPT | Performed by: NURSE PRACTITIONER

## 2024-07-11 PROCEDURE — 87086 URINE CULTURE/COLONY COUNT: CPT | Performed by: EMERGENCY MEDICINE

## 2024-07-11 PROCEDURE — 85025 COMPLETE CBC W/AUTO DIFF WBC: CPT | Performed by: NURSE PRACTITIONER

## 2024-07-11 PROCEDURE — 80053 COMPREHEN METABOLIC PANEL: CPT | Performed by: NURSE PRACTITIONER

## 2024-07-11 PROCEDURE — 85730 THROMBOPLASTIN TIME PARTIAL: CPT | Performed by: NURSE PRACTITIONER

## 2024-07-11 PROCEDURE — 99284 EMERGENCY DEPT VISIT MOD MDM: CPT | Mod: 25

## 2024-07-11 PROCEDURE — 81001 URINALYSIS AUTO W/SCOPE: CPT | Performed by: NURSE PRACTITIONER

## 2024-07-11 RX ORDER — NITROFURANTOIN 25; 75 MG/1; MG/1
100 CAPSULE ORAL 2 TIMES DAILY
Qty: 10 CAPSULE | Refills: 0 | Status: SHIPPED | OUTPATIENT
Start: 2024-07-11 | End: 2024-07-16

## 2024-07-11 NOTE — ED PROVIDER NOTES
Encounter Date: 7/11/2024       History     Chief Complaint   Patient presents with    Lymphadenopathy     Nico groin and axilla lymph node swelling x 4 weeks, with lower back pain, decreased appetite, sent by Dr. Seymour     The patient is a 53 y.o. female with a history of Chronic ITP, Crohn's disease, leukopenia, hypertension, anxiety, and GERD who presents to the  Emergency Department with a chief complaint of lymphadenopathy. Patient states that on June 9th she cut her groin while shaving. She states that she has been experiencing groin lymphadenopathy since this time. Symptoms began 1 month ago and have been constant since onset. Her pain is currently rated as a 6/10 in severity and described as aching with no radiation. Associated symptoms include fever, decreased appetite, and lower back pain. Symptoms are aggravated with nothing and there are no alleviating factors. The patient denies chest pain or SOB. She reports taking nothing prior to arrival with no relief of symptoms. Patient states that she was sent here by Dr. Seymour, hematologist for further evaluation. No other reported symptoms at this time.      The history is provided by the patient. No  was used.   Illness   The current episode started several weeks ago. The problem occurs continuously. The problem has been unchanged. The pain is at a severity of 6/10. Nothing relieves the symptoms. Nothing aggravates the symptoms. Pertinent negatives include no fever, no abdominal pain, no diarrhea, no nausea, no vomiting, no headaches, no hearing loss, no rhinorrhea, no sore throat, no stridor, no swollen glands, no shortness of breath and no rash. Services received include tests performed. Recently, medical care has been given by a specialist and by the PCP.     Review of patient's allergies indicates:   Allergen Reactions    Ketorolac      Other reaction(s): SOB, Stops breathing    Prednisone Shortness Of Breath     Other reaction(s):  SOB    Amoxicillin-pot clavulanate      Other reaction(s): Hives, RASH    Azithromycin      Other reaction(s): RASH    Ciprofloxacin      Other reaction(s): Hives, RASH    Clindamycin      Other reaction(s): Rash    Diphenoxylate-atropine      Other reaction(s): Hives, RASH    Hydrocodone-acetaminophen      Other reaction(s): Hives, hives, SWELLING, tongue swelling    Iodine      Other reaction(s): Difficulty breathing, Hives, SOB, Swelling    Penicillin      Other reaction(s): Hives, RASH    Shellfish containing products      Other reaction(s): Hives, RASH    Sulfamethoxazole-trimethoprim      Other reaction(s): Hives, RASH    Acetaminophen      Hives    Atropine      Hives    Cefuroxime      Hives    Clonazepam Hives    Diphenoxylate      Hives    Doxycycline     Famotidine      Other Reaction(s): Unknown    Hydrocodone      Hives    Povidone-iodine      Other Reaction(s): Unknown    Sulfamethoxazole      Hives    Trimethoprim      Hives    Zoloft [sertraline] Other (See Comments)     Serotonin syndrome      Past Medical History:   Diagnosis Date    Chronic ITP (idiopathic thrombocytopenia)     Crohn's disease     GERD (gastroesophageal reflux disease)     Hypertension     Leukopenia     Psychosomatic disorder     Severe anxiety      Past Surgical History:   Procedure Laterality Date    BONE MARROW BIOPSY  06/13/2016    DILATION AND CURETTAGE OF UTERUS  10/31/2018    PARTIAL HYSTERECTOMY       Family History   Problem Relation Name Age of Onset    Diabetes Mother      Hypertension Mother      Heart attack Mother      Heart disease Mother      Diabetes Father      Brain cancer Father      ALYSE disease Father      Heart disease Sister      Diabetes Brother       Social History     Tobacco Use    Smoking status: Never    Smokeless tobacco: Never   Substance Use Topics    Alcohol use: Never    Drug use: Never     Review of Systems   Constitutional:  Positive for appetite change. Negative for fever.   HENT:  Negative  for hearing loss, rhinorrhea and sore throat.    Respiratory:  Negative for chest tightness, shortness of breath and stridor.    Cardiovascular:  Negative for chest pain.   Gastrointestinal:  Negative for abdominal pain, diarrhea, nausea and vomiting.   Genitourinary:  Negative for dysuria, frequency and urgency.   Musculoskeletal:  Positive for back pain.   Skin:  Negative for rash.   Neurological:  Negative for weakness and headaches.   Hematological:  Positive for adenopathy. Does not bruise/bleed easily.   All other systems reviewed and are negative.      Physical Exam     Initial Vitals [07/11/24 0827]   BP Pulse Resp Temp SpO2   (!) 195/88 66 19 98.2 °F (36.8 °C) 100 %      MAP       --         Physical Exam    Nursing note and vitals reviewed.  Constitutional: She appears well-developed and well-nourished.   HENT:   Head: Normocephalic.   Right Ear: Hearing and tympanic membrane normal.   Left Ear: Hearing and tympanic membrane normal.   Mouth/Throat: Uvula is midline, oropharynx is clear and moist and mucous membranes are normal.   Eyes: Conjunctivae and EOM are normal. Pupils are equal, round, and reactive to light.   Cardiovascular:  Normal rate, regular rhythm, normal heart sounds and normal pulses.           Pulmonary/Chest: Effort normal and breath sounds normal.   Abdominal: Abdomen is soft. Bowel sounds are normal. There is no abdominal tenderness.     Lymphadenopathy:     She has no cervical adenopathy.     She has no axillary adenopathy. No inguinal adenopathy noted on the right or left side.   Neurological: She is alert. GCS eye subscore is 4. GCS verbal subscore is 5. GCS motor subscore is 6.   Skin: Skin is warm, dry and intact. Capillary refill takes less than 2 seconds.         ED Course   Procedures  Labs Reviewed   CBC WITH DIFFERENTIAL - Abnormal; Notable for the following components:       Result Value    WBC 2.42 (*)     MCHC 31.3 (*)     MPV 11.4 (*)     Neut # 1.28 (*)     All other  components within normal limits   COMPREHENSIVE METABOLIC PANEL - Abnormal; Notable for the following components:    Creatinine 1.09 (*)     All other components within normal limits   URINALYSIS, REFLEX TO URINE CULTURE - Abnormal; Notable for the following components:    Appearance, UA Turbid (*)     Blood, UA 1+ (*)     Bacteria, UA Many (*)     Squamous Epithelial Cells, UA Few (*)     Mucous, UA Trace (*)     All other components within normal limits   PROTIME-INR - Normal   APTT - Normal   URINALYSIS, REFLEX TO URINE CULTURE          Imaging Results              CT Chest Abdomen Pelvis Without Contrast (XPD) (Final result)  Result time 07/11/24 12:28:27      Final result by Jeremias Hudson MD (07/11/24 12:28:27)                   Impression:      No acute abnormality of the chest abdomen and pelvis.  There is no pathologically enlarged lymphadenopathy.      Electronically signed by: Jeremias Hudson MD  Date:    07/11/2024  Time:    12:28               Narrative:    EXAMINATION:  CT CHEST ABDOMEN PELVIS WITHOUT CONTRAST(XPD)    CLINICAL HISTORY:  Lymphadenopathy, chest or axilla;Lymphadenopathy, groin;    TECHNIQUE:  Axial images of the chest abdomen and pelvis were obtained without IV contrast administration.  Coronal and sagittal reconstructions were provided.  Three dimensional and MIP images were obtained and evaluated.  Total DLP was 673 mGy-cm. Dose lowering technique and automated exposure control were utilized for this exam.    COMPARISON:  CT of the abdomen and pelvis 11/24/2023.    FINDINGS:  The airway is widely patent.  There is no mediastinal or hilar lymphadenopathy.  There is no axillary or supraclavicular adenopathy.  The heart is not enlarged.    The lung parenchyma is clear.  There is no pulmonary nodule or mass.  There is no pleural effusion.  There is no ground-glass or reticulonodular airspace opacity.    The liver is homogeneous in attenuation.  The spleen, pancreas, adrenal glands, and kidneys  are normal.  There is no hydronephrosis or nephrolithiasis.  The stomach and small bowel are decompressed.  There is no bowel obstruction.  The appendix is normal.  There is colonic diverticulosis without diverticulitis.  The uterus and adnexa are normal for age.  There is physiologic free fluid in the pelvis.  The urinary bladder is normal.  There is no pelvic or retroperitoneal adenopathy.  There is no mesenteric or inguinal lymphadenopathy.  There is no lytic or blastic osseous lesion.                                       Medications - No data to display  Medical Decision Making  The patient is a 53 y.o. female with a history of Chronic ITP, Crohn's disease, leukopenia, hypertension, anxiety, and GERD who presents to the  Emergency Department with a chief complaint of lymphadenopathy. Patient states that on June 9th she cut her groin while shaving. She states that she has been experiencing groin lymphadenopathy since this time. Symptoms began 1 month ago and have been constant since onset. Her pain is currently rated as a 6/10 in severity and described as aching with no radiation. Associated symptoms include fever, decreased appetite, and lower back pain. Symptoms are aggravated with nothing and there are no alleviating factors. The patient denies chest pain or SOB. She reports taking nothing prior to arrival with no relief of symptoms. Patient states that she was sent here by Dr. Seymour, hematologist for further evaluation. No other reported symptoms at this time.      Judging by the patient's chief complaint and pertinent history, the patient has the following possible differential diagnoses, including but not limited to the following.  Some of these are deemed to be lower likelihood and some more likely based on my physical exam and history combined with possible lab work and/or imaging studies.   Please see the pertinent studies, and refer to the HPI.  Some of these diagnoses will take further evaluation to  fully rule out, perhaps as an outpatient and the patient was encouraged to follow up when discharged for more comprehensive evaluation.    Urinary tract infection, cellulitis, neoplastic process    Problems Addressed:  Urinary tract infection without hematuria, site unspecified: acute illness or injury    Amount and/or Complexity of Data Reviewed  Labs:  Decision-making details documented in ED Course.  Radiology: ordered. Decision-making details documented in ED Course.    Risk  Prescription drug management.               ED Course as of 07/11/24 1300   Thu Jul 11, 2024   1120 Bacteria, UA(!): Many [LM]   1232 CT Chest Abdomen Pelvis Without Contrast (XPD)  Impression:     No acute abnormality of the chest abdomen and pelvis.  There is no pathologically enlarged lymphadenopathy.   [LM]   1235 Discussed with Dr. Seymour. CT scan negative. Okay to dc home.  [LM]   1257 I discussed results in detail with patient including follow up. She is amenable to plan and ready for discharge home. She was given strict ER return precautions.  [LM]      ED Course User Index  [LM] Fredrick Resendez NP                           Clinical Impression:  Final diagnoses:  [N39.0] Urinary tract infection without hematuria, site unspecified (Primary)          ED Disposition Condition    Discharge Stable          ED Prescriptions       Medication Sig Dispense Start Date End Date Auth. Provider    nitrofurantoin, macrocrystal-monohydrate, (MACROBID) 100 MG capsule Take 1 capsule (100 mg total) by mouth 2 (two) times daily. for 5 days 10 capsule 7/11/2024 7/16/2024 Fredrick Resendez NP          Follow-up Information       Follow up With Specialties Details Why Contact Info    Mike Tuttle MD Family Medicine Schedule an appointment as soon as possible for a visit   [address] [place] [address]  [phone]               Fredrick Resendez NP  07/11/24 1300

## 2024-07-13 LAB — BACTERIA UR CULT: NORMAL

## 2024-07-17 ENCOUNTER — TELEPHONE (OUTPATIENT)
Dept: HEMATOLOGY/ONCOLOGY | Facility: CLINIC | Age: 54
End: 2024-07-17
Payer: MEDICARE

## 2024-07-17 NOTE — TELEPHONE ENCOUNTER
Patient states that her UTI has resolved, but LN remain enlarged and painful. States Dr. Rebuen Jalloh is referring her to a surgeon for bx, but she does not feel that this is needed and would like to know your thoughts.

## 2024-07-22 ENCOUNTER — HOSPITAL ENCOUNTER (EMERGENCY)
Facility: HOSPITAL | Age: 54
Discharge: HOME | End: 2024-07-22
Attending: EMERGENCY MEDICINE
Payer: MEDICARE

## 2024-07-22 VITALS
HEART RATE: 73 BPM | DIASTOLIC BLOOD PRESSURE: 60 MMHG | OXYGEN SATURATION: 98 % | HEIGHT: 65 IN | TEMPERATURE: 98 F | WEIGHT: 125 LBS | RESPIRATION RATE: 16 BRPM | BODY MASS INDEX: 20.83 KG/M2 | SYSTOLIC BLOOD PRESSURE: 138 MMHG

## 2024-07-22 DIAGNOSIS — R10.31 BILATERAL GROIN PAIN: Primary | ICD-10-CM

## 2024-07-22 DIAGNOSIS — M16.0 BILATERAL HIP JOINT ARTHRITIS: ICD-10-CM

## 2024-07-22 DIAGNOSIS — R10.32 BILATERAL GROIN PAIN: Primary | ICD-10-CM

## 2024-07-22 LAB
ALBUMIN SERPL-MCNC: 4.1 G/DL (ref 3.5–5)
ALBUMIN/GLOB SERPL: 1.2 RATIO (ref 1.1–2)
ALP SERPL-CCNC: 45 UNIT/L (ref 40–150)
ALT SERPL-CCNC: 17 UNIT/L (ref 0–55)
ANION GAP SERPL CALC-SCNC: 10 MEQ/L
AST SERPL-CCNC: 19 UNIT/L (ref 5–34)
BACTERIA #/AREA URNS AUTO: ABNORMAL /HPF
BASOPHILS # BLD AUTO: 0.04 X10(3)/MCL
BASOPHILS NFR BLD AUTO: 1.5 %
BILIRUB SERPL-MCNC: 0.9 MG/DL
BILIRUB UR QL STRIP.AUTO: NEGATIVE
BUN SERPL-MCNC: 10 MG/DL (ref 9.8–20.1)
CALCIUM SERPL-MCNC: 10.6 MG/DL (ref 8.4–10.2)
CHLORIDE SERPL-SCNC: 105 MMOL/L (ref 98–107)
CLARITY UR: CLEAR
CO2 SERPL-SCNC: 24 MMOL/L (ref 22–29)
COLOR UR AUTO: YELLOW
CREAT SERPL-MCNC: 1.07 MG/DL (ref 0.55–1.02)
CREAT/UREA NIT SERPL: 9
EOSINOPHIL # BLD AUTO: 0.02 X10(3)/MCL (ref 0–0.9)
EOSINOPHIL NFR BLD AUTO: 0.7 %
ERYTHROCYTE [DISTWIDTH] IN BLOOD BY AUTOMATED COUNT: 13 % (ref 11.5–17)
GFR SERPLBLD CREATININE-BSD FMLA CKD-EPI: >60 ML/MIN/1.73/M2
GLOBULIN SER-MCNC: 3.4 GM/DL (ref 2.4–3.5)
GLUCOSE SERPL-MCNC: 86 MG/DL (ref 74–100)
GLUCOSE UR QL STRIP: NEGATIVE
HCT VFR BLD AUTO: 38 % (ref 37–47)
HGB BLD-MCNC: 12.1 G/DL (ref 12–16)
HGB UR QL STRIP: ABNORMAL
IMM GRANULOCYTES # BLD AUTO: 0.01 X10(3)/MCL (ref 0–0.04)
IMM GRANULOCYTES NFR BLD AUTO: 0.4 %
KETONES UR QL STRIP: NEGATIVE
LEUKOCYTE ESTERASE UR QL STRIP: NEGATIVE
LYMPHOCYTES # BLD AUTO: 1.26 X10(3)/MCL (ref 0.6–4.6)
LYMPHOCYTES NFR BLD AUTO: 46 %
MCH RBC QN AUTO: 29.6 PG (ref 27–31)
MCHC RBC AUTO-ENTMCNC: 31.8 G/DL (ref 33–36)
MCV RBC AUTO: 92.9 FL (ref 80–94)
MONOCYTES # BLD AUTO: 0.22 X10(3)/MCL (ref 0.1–1.3)
MONOCYTES NFR BLD AUTO: 8 %
NEUTROPHILS # BLD AUTO: 1.19 X10(3)/MCL (ref 2.1–9.2)
NEUTROPHILS NFR BLD AUTO: 43.4 %
NITRITE UR QL STRIP: NEGATIVE
PH UR STRIP: 5.5 [PH]
PLATELET # BLD AUTO: 133 X10(3)/MCL (ref 130–400)
PMV BLD AUTO: 10.9 FL (ref 7.4–10.4)
POTASSIUM SERPL-SCNC: 4.2 MMOL/L (ref 3.5–5.1)
PROT SERPL-MCNC: 7.5 GM/DL (ref 6.4–8.3)
PROT UR QL STRIP: NEGATIVE
RBC # BLD AUTO: 4.09 X10(6)/MCL (ref 4.2–5.4)
RBC #/AREA URNS AUTO: ABNORMAL /HPF
SODIUM SERPL-SCNC: 139 MMOL/L (ref 136–145)
SP GR UR STRIP.AUTO: <=1.005 (ref 1–1.03)
SQUAMOUS #/AREA URNS AUTO: ABNORMAL /HPF
UROBILINOGEN UR STRIP-ACNC: 0.2
WBC # BLD AUTO: 2.74 X10(3)/MCL (ref 4.5–11.5)
WBC #/AREA URNS AUTO: ABNORMAL /HPF

## 2024-07-22 PROCEDURE — 25000003 PHARM REV CODE 250

## 2024-07-22 PROCEDURE — 81001 URINALYSIS AUTO W/SCOPE: CPT

## 2024-07-22 PROCEDURE — 85025 COMPLETE CBC W/AUTO DIFF WBC: CPT

## 2024-07-22 PROCEDURE — 80053 COMPREHEN METABOLIC PANEL: CPT

## 2024-07-22 PROCEDURE — 81003 URINALYSIS AUTO W/O SCOPE: CPT

## 2024-07-22 PROCEDURE — 99284 EMERGENCY DEPT VISIT MOD MDM: CPT | Mod: 25

## 2024-07-22 RX ORDER — ACETAMINOPHEN 500 MG
500 TABLET ORAL
Status: COMPLETED | OUTPATIENT
Start: 2024-07-22 | End: 2024-07-22

## 2024-07-22 RX ADMIN — ACETAMINOPHEN 500 MG: 500 TABLET ORAL at 02:07

## 2024-07-22 NOTE — DISCHARGE INSTRUCTIONS
Tylenol as needed for pain. Can try Aspercream, biofreeze, tiger balm for the pain. Stay active as tolerated. Follow-up with primary care for orthopedic (bone) doctor referral. Return with new or worsening symptoms.     Dr. Silverio:  Office  [address]  [address]  [place] [address]   PHONE  [phone]

## 2024-07-22 NOTE — ED PROVIDER NOTES
Encounter Date: 7/22/2024       History     Chief Complaint   Patient presents with    Groin Pain     BL groin pain x 1 month.  Seen pcp twice and [facility] ER last week Dx with UTI,  states meds did not help.      See MDM.     The history is provided by the patient. No  was used.     Review of patient's allergies indicates:   Allergen Reactions    Ketorolac      Other reaction(s): SOB, Stops breathing    Prednisone Shortness Of Breath     Other reaction(s): SOB    Amoxicillin-pot clavulanate      Other reaction(s): Hives, RASH    Azithromycin      Other reaction(s): RASH    Ciprofloxacin      Other reaction(s): Hives, RASH    Clindamycin      Other reaction(s): Rash    Diphenoxylate-atropine      Other reaction(s): Hives, RASH    Hydrocodone-acetaminophen      Other reaction(s): Hives, hives, SWELLING, tongue swelling    Iodine      Other reaction(s): Difficulty breathing, Hives, SOB, Swelling    Penicillin      Other reaction(s): Hives, RASH    Shellfish containing products      Other reaction(s): Hives, RASH    Sulfamethoxazole-trimethoprim      Other reaction(s): Hives, RASH    Acetaminophen      Hives    Atropine      Hives    Cefuroxime      Hives    Clonazepam Hives    Diphenoxylate      Hives    Doxycycline     Famotidine      Other Reaction(s): Unknown    Hydrocodone      Hives    Povidone-iodine      Other Reaction(s): Unknown    Sulfamethoxazole      Hives    Trimethoprim      Hives    Zoloft [sertraline] Other (See Comments)     Serotonin syndrome      Past Medical History:   Diagnosis Date    Chronic ITP (idiopathic thrombocytopenia)     Crohn's disease     GERD (gastroesophageal reflux disease)     Hypertension     Leukopenia     Psychosomatic disorder     Severe anxiety      Past Surgical History:   Procedure Laterality Date    BONE MARROW BIOPSY  06/13/2016    DILATION AND CURETTAGE OF UTERUS  10/31/2018    PARTIAL HYSTERECTOMY       Family History   Problem Relation Name Age of Onset     Diabetes Mother      Hypertension Mother      Heart attack Mother      Heart disease Mother      Diabetes Father      Brain cancer Father      ALYSE disease Father      Heart disease Sister      Diabetes Brother       Social History     Tobacco Use    Smoking status: Never    Smokeless tobacco: Never   Substance Use Topics    Alcohol use: Never    Drug use: Never     Review of Systems   Constitutional:  Negative for chills and fever.   Gastrointestinal:  Positive for nausea. Negative for abdominal pain, blood in stool, constipation, diarrhea and vomiting.   Genitourinary:  Negative for dysuria, flank pain, hematuria, pelvic pain, vaginal bleeding, vaginal discharge and vaginal pain.   Musculoskeletal:  Positive for myalgias (groin pain).   Hematological:  Positive for adenopathy.   All other systems reviewed and are negative.      Physical Exam     Initial Vitals [07/22/24 1137]   BP Pulse Resp Temp SpO2   (!) 151/65 71 16 98.2 °F (36.8 °C) 100 %      MAP       --         Physical Exam    Nursing note and vitals reviewed.  Constitutional: She appears well-developed and well-nourished. She is not diaphoretic. No distress.   HENT:   Head: Normocephalic and atraumatic.   Cardiovascular:  Normal rate, regular rhythm, normal heart sounds and intact distal pulses.     Exam reveals no gallop and no friction rub.       No murmur heard.  Pulmonary/Chest: Breath sounds normal. No respiratory distress. She has no wheezes. She has no rhonchi. She has no rales.   Abdominal: Abdomen is soft. Bowel sounds are normal. She exhibits no distension and no mass. There is no abdominal tenderness. There is no rebound and no guarding.   Musculoskeletal:         General: Normal range of motion.      Comments: Reproducible groin pain bilaterally. No palpable lymphadenopathy, erythema, warmth, ecchymosis, laceration.  Ambulatory in ED.      Neurological: She is alert and oriented to person, place, and time.   Skin: Skin is warm and dry.    Psychiatric: She has a normal mood and affect. Her behavior is normal.         ED Course   Procedures  Labs Reviewed   URINALYSIS, REFLEX TO URINE CULTURE - Abnormal       Result Value    Color, UA Yellow      Appearance, UA Clear      Specific Gravity, UA <=1.005      pH, UA 5.5      Protein, UA Negative      Glucose, UA Negative      Ketones, UA Negative      Blood, UA 1+ (*)     Bilirubin, UA Negative      Urobilinogen, UA 0.2      Nitrites, UA Negative      Leukocyte Esterase, UA Negative     URINALYSIS, MICROSCOPIC - Abnormal    Bacteria, UA Moderate (*)     RBC, UA 0-2      WBC, UA 0-2      Squamous Epithelial Cells, UA Moderate (*)    COMPREHENSIVE METABOLIC PANEL - Abnormal    Sodium 139      Potassium 4.2      Chloride 105      CO2 24      Glucose 86      Blood Urea Nitrogen 10.0      Creatinine 1.07 (*)     Calcium 10.6 (*)     Protein Total 7.5      Albumin 4.1      Globulin 3.4      Albumin/Globulin Ratio 1.2      Bilirubin Total 0.9      ALP 45      ALT 17      AST 19      eGFR >60      Anion Gap 10.0      BUN/Creatinine Ratio 9     CBC WITH DIFFERENTIAL - Abnormal    WBC 2.74 (*)     RBC 4.09 (*)     Hgb 12.1      Hct 38.0      MCV 92.9      MCH 29.6      MCHC 31.8 (*)     RDW 13.0      Platelet 133      MPV 10.9 (*)     Neut % 43.4      Lymph % 46.0      Mono % 8.0      Eos % 0.7      Basophil % 1.5      Lymph # 1.26      Neut # 1.19 (*)     Mono # 0.22      Eos # 0.02      Baso # 0.04      IG# 0.01      IG% 0.4     CBC W/ AUTO DIFFERENTIAL    Narrative:     The following orders were created for panel order CBC auto differential.  Procedure                               Abnormality         Status                     ---------                               -----------         ------                     CBC with Differential[[id number]]       Abnormal            Final result                 Please view results for these tests on the individual orders.          Imaging Results              X-Ray Hips  Bilateral 2 View Incl AP Pelvis (Final result)  Result time 07/22/24 14:29:09      Final result by Xu Kim MD (07/22/24 14:29:09)                   Impression:      1. No acute osseous defect identified  2. Osteoarthritis      Electronically signed by: Xu Kim  Date:    07/22/2024  Time:    14:29               Narrative:    EXAMINATION:  XR HIPS BILATERAL 2 VIEW INCL AP PELVIS    CLINICAL HISTORY:  Right lower quadrant pain; .    COMPARISON:  None available.    FINDINGS:  AP and frogleg lateral views obtained of the hips bilaterally.  There is limited changes in position between AP and frogleg lateral views.  No fracture or dislocation is seen.  There is mild narrowing of the hip joints bilaterally.  No aggressive osteolytic or osteoblastic lesion is seen.  Multiple small round calcifications are seen within the pelvis suggesting phleboliths                                       Medications   acetaminophen tablet 500 mg (500 mg Oral Given 7/22/24 1405)     Medical Decision Making  Pt is a 52 y/o female with history of chronic ITP who presents for bilateral groin pain x3 months. States that she was recently diagnosed with UTI and was treated with antibiotics. States that the pain has gotten worse, is not waxing and waning, did not improve after treatment with antibiotics. Patient states that she is not sure what makes pain worse, tylenol improves pain slightly. Also notes that she was told that she has enlarged lymph nodes. Of note, there were no lymph nodes that were seen on CT on 7/11.     Patient is not toxic appearing, in no acute distress. No evidence of UTI or other abnormalities seen on UA. CBC, CMP unremarkable. Kidney function WNL. Reviewed workup from ED visit on 7/11 including CT abd/pelvis without significant abnormality, no evidence of hydronephrosis, stones, other pelvic pathology. Because there have been no acute changes since original presentation and sx have been the same for the past  3 months I do not believe further workout is necessary at this time. Pelvis x-ray shows arthritic changes of bilateral hips right worse than left. Pt not able to take anti-inflammatories due to ulcers. Advised patient to continue tylenol as needed for pain. Follow-up with primary care provider for referral to orthopedist for further management of arthritic changes. Educated on conservative OTC measures including voltaren cream, Aspercream. Patient expressed understanding and was in agreement with the plan.     Amount and/or Complexity of Data Reviewed  External Data Reviewed: labs, radiology and notes.     Details: Reviewed previous workup from ED visit for same concern on 7/11 as detailed. No inguinal lymphadenopathy, pelvic pathology. No evidence of UTI, kidney pathology.   Labs: ordered. Decision-making details documented in ED Course.  Radiology: ordered. Decision-making details documented in ED Course.    Risk  OTC drugs.      Additional MDM:   Differential Diagnosis:   Other: The following diagnoses were also considered and will be evaluated: UTI, ureterolithiasis and hip arthritis.            ED Course as of 07/22/24 1939 Mon Jul 22, 2024   1332 Leukocyte Esterase, UA: Negative [ME]   1332 NITRITE UA: Negative [ME]   1332 Squam Epithel, UA(!): Moderate [ME]   1332 Bacteria, UA(!): Moderate [ME]   1332 eGFR: >60 [ME]   1332 Anion Gap: 10.0 [ME]   1332 WBC(!): 2.74 [ME]      ED Course User Index  [ME] Callie Brush PA                           Clinical Impression:  Final diagnoses:  [R10.31, R10.32] Bilateral groin pain (Primary)  [M16.0] Bilateral hip joint arthritis          ED Disposition Condition    Discharge Stable          ED Prescriptions    None       Follow-up Information       Follow up With Specialties Details Why Contact Info    Primary Care Provider  Call in 1 week  [phone]             Callie rBush PA  07/22/24 1939

## 2024-07-26 ENCOUNTER — HOSPITAL ENCOUNTER (EMERGENCY)
Facility: HOSPITAL | Age: 54
Discharge: HOME | End: 2024-07-26
Attending: EMERGENCY MEDICINE
Payer: MEDICARE

## 2024-07-26 VITALS
RESPIRATION RATE: 20 BRPM | OXYGEN SATURATION: 99 % | DIASTOLIC BLOOD PRESSURE: 78 MMHG | HEIGHT: 65 IN | HEART RATE: 64 BPM | WEIGHT: 125 LBS | BODY MASS INDEX: 20.83 KG/M2 | TEMPERATURE: 99 F | SYSTOLIC BLOOD PRESSURE: 151 MMHG

## 2024-07-26 DIAGNOSIS — M79.605 BILATERAL LEG PAIN: Primary | ICD-10-CM

## 2024-07-26 DIAGNOSIS — M79.604 BILATERAL LEG PAIN: Primary | ICD-10-CM

## 2024-07-26 PROCEDURE — 99284 EMERGENCY DEPT VISIT MOD MDM: CPT | Mod: 25

## 2024-07-26 NOTE — ED TRIAGE NOTES
Pt complaint of pain to left upper leg and right groin area radiating to hip area that has worsened over the past 3 weeks. Pt relates that [facility] instructed pt to go to ED for further evaluation and care

## 2024-07-26 NOTE — ED PROVIDER NOTES
Encounter Date: 7/26/2024       History     Chief Complaint   Patient presents with    Leg Pain     Pt complaint of pain to left upper leg and right groin area radiating to hip area that has worsened over the past 3 weeks. Pt relates that [facility] instructed pt to go to ED for further evaluation and care     See MDM for details     The history is provided by the patient.     Review of patient's allergies indicates:   Allergen Reactions    Ketorolac      Other reaction(s): SOB, Stops breathing    Prednisone Shortness Of Breath     Other reaction(s): SOB    Amoxicillin-pot clavulanate      Other reaction(s): Hives, RASH    Azithromycin      Other reaction(s): RASH    Ciprofloxacin      Other reaction(s): Hives, RASH    Clindamycin      Other reaction(s): Rash    Diphenoxylate-atropine      Other reaction(s): Hives, RASH    Hydrocodone-acetaminophen      Other reaction(s): Hives, hives, SWELLING, tongue swelling    Iodine      Other reaction(s): Difficulty breathing, Hives, SOB, Swelling    Penicillin      Other reaction(s): Hives, RASH    Shellfish containing products      Other reaction(s): Hives, RASH    Sulfamethoxazole-trimethoprim      Other reaction(s): Hives, RASH    Acetaminophen      Hives    Atropine      Hives    Cefuroxime      Hives    Clonazepam Hives    Diphenoxylate      Hives    Doxycycline     Famotidine      Other Reaction(s): Unknown    Hydrocodone      Hives    Povidone-iodine      Other Reaction(s): Unknown    Sulfamethoxazole      Hives    Trimethoprim      Hives    Zoloft [sertraline] Other (See Comments)     Serotonin syndrome      Past Medical History:   Diagnosis Date    Chronic ITP (idiopathic thrombocytopenia)     Crohn's disease     GERD (gastroesophageal reflux disease)     Hypertension     Leukopenia     Psychosomatic disorder     Severe anxiety      Past Surgical History:   Procedure Laterality Date    BONE MARROW BIOPSY  06/13/2016    DILATION AND CURETTAGE OF UTERUS   10/31/2018    PARTIAL HYSTERECTOMY       Family History   Problem Relation Name Age of Onset    Diabetes Mother      Hypertension Mother      Heart attack Mother      Heart disease Mother      Diabetes Father      Brain cancer Father      ALYSE disease Father      Heart disease Sister      Diabetes Brother       Social History     Tobacco Use    Smoking status: Never    Smokeless tobacco: Never   Substance Use Topics    Alcohol use: Never    Drug use: Never     Review of Systems   Constitutional:  Positive for chills. Negative for fever.   Respiratory:  Negative for shortness of breath.    Cardiovascular:  Negative for chest pain.   Musculoskeletal:  Positive for gait problem and myalgias. Negative for arthralgias and back pain.   Skin:  Positive for rash.   All other systems reviewed and are negative.      Physical Exam     Initial Vitals [07/26/24 1518]   BP Pulse Resp Temp SpO2   (!) 178/87 (!) 57 18 98.6 °F (37 °C) 99 %      MAP       --         Physical Exam    Nursing note and vitals reviewed.  Constitutional: She appears well-developed and well-nourished. No distress.   HENT:   Head: Normocephalic and atraumatic.   Eyes: Conjunctivae and EOM are normal.   Neck:   Normal range of motion.  Cardiovascular:  Normal rate.           Pulmonary/Chest: No respiratory distress.   Musculoskeletal:         General: Normal range of motion.      Cervical back: Normal range of motion.      Comments: Lumbar spine: No bony tenderness.  Negative paraspinal muscle tenderness.  Bilateral hips with normal range of motion without discomfort.  5/5 motor strength of bilateral lower extremities while in bed.  Patient reports that she is unable to walk, does require assistance, however, with minimal assistance she was able to ambulate without difficulty.     Neurological: She is alert and oriented to person, place, and time. She has normal strength.   Skin: Skin is warm and dry.   Psychiatric: She has a normal mood and affect.   Thought  content is anxious and frequently changing the subject         ED Course   Procedures  Labs Reviewed - No data to display       Imaging Results              CT Pelvis Without Contrast (Final result)  Result time 07/26/24 16:57:23      Final result by Mir Mora MD (07/26/24 16:57:23)                   Impression:      No acute findings or significant interval change compared to earlier this month.      Electronically signed by: Mir Mora  Date:    07/26/2024  Time:    16:57               Narrative:    EXAMINATION:  CT PELVIS WITHOUT CONTRAST    CLINICAL HISTORY:  Pelvis pain, stress fracture suspected, neg xray;    TECHNIQUE:  Helical acquisition through the pelvis without IV contrast.  Three plane reconstructions were provided for review.  mGycm. Automatic exposure control, adjustment of mA/kV or iterative reconstruction technique was used to reduce radiation.    COMPARISON:  11 July 2024    FINDINGS:  There is no fracture or dislocation.  Small amount of pelvic free fluid similar to prior.  There is colonic diverticulosis without evidence of diverticulitis.                                       CT Lumbar Spine Without Contrast (Final result)  Result time 07/26/24 16:55:33      Final result by Radha Story MD (07/26/24 16:55:33)                   Impression:      No acute fracture identified.      Electronically signed by: Radha Story  Date:    07/26/2024  Time:    16:55               Narrative:    EXAMINATION:  CT LUMBAR SPINE WITHOUT CONTRAST    CLINICAL HISTORY:  Low back pain, progressive neurologic deficit;    TECHNIQUE:  Noncontrast CT images of the lumbar spine. Axial, coronal, and sagittal reformatted images were obtained. Dose length product is 452 mGycm. Automatic exposure control, adjustment of mA/kV or iterative reconstruction technique was used to limit radiation dose.    COMPARISON:  CT chest, abdomen pelvis dated 07/11/2024    FINDINGS:  There are 5 non-rib-bearing lumbar  "type vertebral bodies.  Alignment is normal.  The vertebral body heights are maintained.  There is no acute fracture identified.  There is no paraspinal hematoma.                                       Medications - No data to display  Medical Decision Making  53-year-old female with past medical history of chronic ITP, Crohn's disease, hypertension, leukopenia, psychosomatic disorder presents to the ER for evaluation of worsening bilateral medial thigh pain x4 weeks.  Patient reports that this all began after she was possibly exposed to an insect bite.  She is unsure of which insect, however, following she began to develop a rash to her bilateral lower extremities.  She reports that since then the rash has improved, however, she is now experiencing bilateral medial thigh pain.  She reports that the pain is constant and varying in character and intensity.  She reports that she has trouble walking secondary to the pain that she experiences to her medial thighs.  She still is able to ambulate.  Per patient, she has seen multiple specialists for this problem thus far and diagnosis has been inconclusive.  She was recently treated for a UTI with Macrobid which she completed.  She reports that following she continued to have bilateral thigh pain?  She shares that since then she has spoken to her hematologist, PCP, OBGYN and they have all recommended that she needs "this specific lab work testing to rule out any bacterial infection in her blood, skin, kidneys, fungus infection, etc.. " she also shares that the specialist were unable to order any of these specialized lab work for her and she was recommended to come to the ER for this.  She was seen in the ER at [facility] on 07/22/2024 where she was suspected to have arthritis.  She shares that in his "not arthritis" and she has continued to have pain to her medial thighs.  She shares that since then she has spoken with multiple specialist who have been referring her to " "different specialist, however, she shares that she does not believe that she needs to see the specialist and they they should just be able to order the lab work.  Overall, history is very confusing and involves the many specialist, phone calls, and "visits" which have not been documented.  I am able to see some phone call conversations in her chart review, however, none of them a regarding her current complaints or need for specific testing.      Given the patient's complaint, CT lumbar and pelvic imaging were ordered which revealed no acute abnormalities.  I was able to review the lab work which was completed on 07/22/2024 which revealed no abnormalities.  I reviewed the lab work and today CT imaging with the patient and I reassured her that they does not appear that she has any systemic infection.  The patient is insistent on specific" fungal labs" however, she has no indication of a rash, abnormal vital signs, abnormal lab work which I believe requires further ER workup today.  I discussed with the patient that if any of her specialist or requesting specialized lab work, that they should order of the lab work as we are unable to do so in the ER especially without indication.  Discussed with the patient that I am unsure today what is causing her bilateral thigh pain, however, she should continue to see her specialist for further evaluation as it does not appear to be emergent today.  Patient verbalized her understanding of this.  She will be discharged home.    Amount and/or Complexity of Data Reviewed  External Data Reviewed: labs, radiology and notes.     Details: ED note from 07/11/2024, ED note from 07/22/2024  Multiple telephone notes throughout July with her hematologist and OBGYN  Radiology: ordered. Decision-making details documented in ED Course.  Discussion of management or test interpretation with external provider(s): I did review the patient presentation, past medical history, previous imaging lab " work with Dr. Pepper, emergency Medicine, who agreed with no further workup in the ER today.      Additional MDM:   Differential Diagnosis:   Other: The following diagnoses were also considered and will be evaluated: psychogenic illness, neuropathy and rash.            ED Course as of 07/26/24 1818 Fri Jul 26, 2024   1704 CT pelvis: Impression:     No acute findings or significant interval change compared to earlier this month.      [LM]   1704 CT Lumbar Spine: Impression:     No acute fracture identified.   [LM]      ED Course User Index  [LM] Velia Kruse PA                           Clinical Impression:  Final diagnoses:  [M79.604, M79.605] Bilateral leg pain (Primary)          ED Disposition Condition    Discharge Stable          ED Prescriptions    None       Follow-up Information       Follow up With Specialties Details Why Contact Info    OB-GYN  Call in 1 day for follow up for pelvic swabs     Arpan Omalley II, MD Oncology, Hematology and Oncology Call  As needed [address] [facility]  [address]  [facility] [address]  [phone]               Velia Kruse PA  07/26/24 1818

## 2024-07-26 NOTE — DISCHARGE INSTRUCTIONS
Recommended he follow up with her specialist for further workup and evaluation of this problem.      Return to ER as needed.

## 2024-08-26 ENCOUNTER — TELEPHONE (OUTPATIENT)
Dept: INTERNAL MEDICINE | Facility: CLINIC | Age: 54
End: 2024-08-26
Payer: MEDICARE

## 2024-08-26 NOTE — TELEPHONE ENCOUNTER
ARE THERE ANY OUTSTANDING TASK IN PATIENT'S CHART? NP NO LAB    IS THERE ANY DOCUMENTATION OF TASK IN CHART?        PLEASE HAVE PATIENT BRING A FULL LIST OR ACTUAL MEDICATIONS TO THE OFFICE VISIT

## 2024-09-03 ENCOUNTER — OFFICE VISIT (OUTPATIENT)
Dept: INTERNAL MEDICINE | Facility: CLINIC | Age: 54
End: 2024-09-03
Payer: MEDICARE

## 2024-09-03 VITALS
BODY MASS INDEX: 20.8 KG/M2 | HEIGHT: 65 IN | DIASTOLIC BLOOD PRESSURE: 81 MMHG | OXYGEN SATURATION: 100 % | SYSTOLIC BLOOD PRESSURE: 135 MMHG | HEART RATE: 86 BPM

## 2024-09-03 DIAGNOSIS — I10 HYPERTENSION, UNSPECIFIED TYPE: Primary | ICD-10-CM

## 2024-09-03 DIAGNOSIS — M79.89 PAIN AND SWELLING OF LOWER EXTREMITY, RIGHT: ICD-10-CM

## 2024-09-03 DIAGNOSIS — D69.3 CHRONIC IDIOPATHIC THROMBOCYTOPENIC PURPURA: ICD-10-CM

## 2024-09-03 DIAGNOSIS — D72.819 LEUKOPENIA, UNSPECIFIED TYPE: ICD-10-CM

## 2024-09-03 DIAGNOSIS — K21.9 GASTROESOPHAGEAL REFLUX DISEASE, UNSPECIFIED WHETHER ESOPHAGITIS PRESENT: ICD-10-CM

## 2024-09-03 DIAGNOSIS — M79.604 PAIN AND SWELLING OF LOWER EXTREMITY, RIGHT: ICD-10-CM

## 2024-09-03 DIAGNOSIS — R63.4 WEIGHT LOSS: ICD-10-CM

## 2024-09-03 PROCEDURE — 99204 OFFICE O/P NEW MOD 45 MIN: CPT | Mod: ,,, | Performed by: STUDENT IN AN ORGANIZED HEALTH CARE EDUCATION/TRAINING PROGRAM

## 2024-09-03 NOTE — LETTER
AUTHORIZATION FOR RELEASE OF   CONFIDENTIAL INFORMATION    Dear medical records,    We are seeing Amber Martínez, date of birth 1970, in the clinic at [place] [address] [facility]. Elle Marc MD is the patient's PCP. Amber Martínez has an outstanding lab/procedure at the time we reviewed her chart. In order to help keep her health information updated, she has authorized us to request the following medical record(s):        (  )  MAMMOGRAM                                      (  )  COLONOSCOPY      (  )  PAP SMEAR                                          ( X )  OUTSIDE LAB RESULTS     (  )  DEXA SCAN                                          (  )  EYE EXAM            (  )  FOOT EXAM                                          (  )  ENTIRE RECORD     (  )  OUTSIDE IMMUNIZATIONS                 ( X ) xray, MRI, CT, notes         Please fax records to Elle Marc MD, [phone]     If you have any questions, please contact us at [phone].           Patient Name: Amber Martínez  : 1970  Patient Phone #: [phone]

## 2024-09-03 NOTE — LETTER
AUTHORIZATION FOR RELEASE OF   CONFIDENTIAL INFORMATION    Dear medical records,    We are seeing Amber Martínez, date of birth 1970, in the clinic at [place] [address] [facility]. Elle Marc MD is the patient's PCP. Amber Martínez has an outstanding lab/procedure at the time we reviewed her chart. In order to help keep her health information updated, she has authorized us to request the following medical record(s):        (  )  MAMMOGRAM                                      (  )  COLONOSCOPY      (  )  PAP SMEAR                                          (  )  OUTSIDE LAB RESULTS     (  )  DEXA SCAN                                          (  )  EYE EXAM            (  )  FOOT EXAM                                          (  )  ENTIRE RECORD     (  )  OUTSIDE IMMUNIZATIONS                 ( X ) LAST 2 YEARS OF RECORDS         Please fax records to Elle Marc MD, [phone]     If you have any questions, please contact us at [phone].           Patient Name: Amber Martínez  : 1970  Patient Phone #: [phone]

## 2024-09-03 NOTE — LETTER
AUTHORIZATION FOR RELEASE OF   CONFIDENTIAL INFORMATION    Dear medical records,    We are seeing Amber Martínez, date of birth 1970, in the clinic at [place] [address] [facility]. Elle Marc MD is the patient's PCP. Amber Martínez has an outstanding lab/procedure at the time we reviewed her chart. In order to help keep her health information updated, she has authorized us to request the following medical record(s):        (  )  MAMMOGRAM                                      ( X )  COLONOSCOPY      (  )  PAP SMEAR                                          (  )  OUTSIDE LAB RESULTS     (  )  DEXA SCAN                                          (  )  EYE EXAM            (  )  FOOT EXAM                                          (  )  ENTIRE RECORD     (  )  OUTSIDE IMMUNIZATIONS                 (  )  _______________         Please fax records to Elle Marc MD, [phone]     If you have any questions, please contact us at [phone].           Patient Name: Amber Martínez  : 1970  Patient Phone #: [phone]

## 2024-09-04 ENCOUNTER — NURSE TRIAGE (OUTPATIENT)
Dept: ADMINISTRATIVE | Facility: CLINIC | Age: 54
End: 2024-09-04
Payer: MEDICARE

## 2024-09-04 NOTE — TELEPHONE ENCOUNTER
Pt calling, states that she was recently seen by Dr. Boubacar Valentino and was told to schedule US for lower right extremity due to worsening symptoms and not being able to walk. When pt was called to schedule US, they stated there wasn't an appt available until October. Pt reports that at one point when she was speaking with Dr. Boubacar Valentino, she was told to return to ED, call for an ambulance as necessary, and tell ED staff that Dr. Boubacar Valentino wants to be called so she can admit pt to hospital. Pt is concerned that she's been to the ED already for this same issue and no resolution was met. Advised pt that she should listen to Dr. Boubacar Valentino's advice and call 911 to get to ED and explain to ED staff what Dr. Boubacar Valentino said. Pt verbalized understanding, stated she would call 911 now.    Reason for Disposition   Caller has already spoken with another triager or PCP AND has further questions AND triager able to answer questions.    Additional Information   Caller has already spoken to PCP (doctor or NP/PA) or another triager    Protocols used: Information Only Call - No Triage-A-AH, No Contact or Duplicate Contact Call-A-AH

## 2024-09-05 ENCOUNTER — TELEPHONE (OUTPATIENT)
Dept: INTERNAL MEDICINE | Facility: CLINIC | Age: 54
End: 2024-09-05
Payer: MEDICARE

## 2024-09-05 ENCOUNTER — HOSPITAL ENCOUNTER (INPATIENT)
Facility: HOSPITAL | Age: 54
LOS: 4 days | Discharge: PSYCHIATRIC OR SUBSTANCE USE FACILITY | DRG: 556 | End: 2024-09-10
Attending: STUDENT IN AN ORGANIZED HEALTH CARE EDUCATION/TRAINING PROGRAM | Admitting: STUDENT IN AN ORGANIZED HEALTH CARE EDUCATION/TRAINING PROGRAM
Payer: MEDICARE

## 2024-09-05 DIAGNOSIS — D72.819 LEUKOPENIA, UNSPECIFIED TYPE: ICD-10-CM

## 2024-09-05 DIAGNOSIS — R60.0 LEG EDEMA: ICD-10-CM

## 2024-09-05 DIAGNOSIS — I83.893 VARICOSE VEINS OF LEG WITH SWELLING, BILATERAL: ICD-10-CM

## 2024-09-05 DIAGNOSIS — R63.4 WEIGHT LOSS: ICD-10-CM

## 2024-09-05 DIAGNOSIS — D64.9 ACUTE ANEMIA: ICD-10-CM

## 2024-09-05 DIAGNOSIS — R60.0 LOWER EXTREMITY EDEMA: Primary | ICD-10-CM

## 2024-09-05 PROBLEM — M79.604 PAIN AND SWELLING OF LOWER EXTREMITY, RIGHT: Status: ACTIVE | Noted: 2024-09-05

## 2024-09-05 PROBLEM — M79.89 PAIN AND SWELLING OF LOWER EXTREMITY, RIGHT: Status: ACTIVE | Noted: 2024-09-05

## 2024-09-05 LAB
ABORH RETYPE: NORMAL
ALBUMIN SERPL-MCNC: 3.7 G/DL (ref 3.5–5)
ALBUMIN/GLOB SERPL: 1.1 RATIO (ref 1.1–2)
ALP SERPL-CCNC: 43 UNIT/L (ref 40–150)
ALT SERPL-CCNC: 10 UNIT/L (ref 0–55)
ANION GAP SERPL CALC-SCNC: 5 MEQ/L
AST SERPL-CCNC: 16 UNIT/L (ref 5–34)
BASOPHILS # BLD AUTO: 0.05 X10(3)/MCL
BASOPHILS NFR BLD AUTO: 1.2 %
BILIRUB SERPL-MCNC: 1.4 MG/DL
BNP BLD-MCNC: <10 PG/ML
BUN SERPL-MCNC: 12.8 MG/DL (ref 9.8–20.1)
CALCIUM SERPL-MCNC: 10.1 MG/DL (ref 8.4–10.2)
CHLORIDE SERPL-SCNC: 104 MMOL/L (ref 98–107)
CO2 SERPL-SCNC: 27 MMOL/L (ref 22–29)
CREAT SERPL-MCNC: 1.04 MG/DL (ref 0.55–1.02)
CREAT/UREA NIT SERPL: 12
EOSINOPHIL # BLD AUTO: 0.08 X10(3)/MCL (ref 0–0.9)
EOSINOPHIL NFR BLD AUTO: 1.9 %
ERYTHROCYTE [DISTWIDTH] IN BLOOD BY AUTOMATED COUNT: 12.5 % (ref 11.5–17)
GFR SERPLBLD CREATININE-BSD FMLA CKD-EPI: >60 ML/MIN/1.73/M2
GLOBULIN SER-MCNC: 3.4 GM/DL (ref 2.4–3.5)
GLUCOSE SERPL-MCNC: 127 MG/DL (ref 74–100)
GROUP & RH: NORMAL
HCT VFR BLD AUTO: 24 % (ref 37–47)
HGB BLD-MCNC: 7.6 G/DL (ref 12–16)
IMM GRANULOCYTES # BLD AUTO: 0.01 X10(3)/MCL (ref 0–0.04)
IMM GRANULOCYTES NFR BLD AUTO: 0.2 %
INDIRECT COOMBS: NORMAL
LYMPHOCYTES # BLD AUTO: 1 X10(3)/MCL (ref 0.6–4.6)
LYMPHOCYTES NFR BLD AUTO: 24 %
MCH RBC QN AUTO: 29.9 PG (ref 27–31)
MCHC RBC AUTO-ENTMCNC: 31.7 G/DL (ref 33–36)
MCV RBC AUTO: 94.5 FL (ref 80–94)
MONOCYTES # BLD AUTO: 0.34 X10(3)/MCL (ref 0.1–1.3)
MONOCYTES NFR BLD AUTO: 8.2 %
NEUTROPHILS # BLD AUTO: 2.69 X10(3)/MCL (ref 2.1–9.2)
NEUTROPHILS NFR BLD AUTO: 64.5 %
NRBC BLD AUTO-RTO: 0 %
PLATELET # BLD AUTO: 159 X10(3)/MCL (ref 130–400)
PMV BLD AUTO: 10.5 FL (ref 7.4–10.4)
POTASSIUM SERPL-SCNC: 4.3 MMOL/L (ref 3.5–5.1)
PROT SERPL-MCNC: 7.1 GM/DL (ref 6.4–8.3)
RBC # BLD AUTO: 2.54 X10(6)/MCL (ref 4.2–5.4)
SODIUM SERPL-SCNC: 136 MMOL/L (ref 136–145)
SPECIMEN OUTDATE: NORMAL
TROPONIN I SERPL-MCNC: <0.01 NG/ML (ref 0–0.04)
WBC # BLD AUTO: 4.17 X10(3)/MCL (ref 4.5–11.5)

## 2024-09-05 PROCEDURE — 63600175 PHARM REV CODE 636 W HCPCS: Performed by: PHYSICIAN ASSISTANT

## 2024-09-05 PROCEDURE — G0378 HOSPITAL OBSERVATION PER HR: HCPCS

## 2024-09-05 PROCEDURE — 86900 BLOOD TYPING SEROLOGIC ABO: CPT | Performed by: PHYSICIAN ASSISTANT

## 2024-09-05 PROCEDURE — 85025 COMPLETE CBC W/AUTO DIFF WBC: CPT | Performed by: PHYSICIAN ASSISTANT

## 2024-09-05 PROCEDURE — 99285 EMERGENCY DEPT VISIT HI MDM: CPT | Mod: 25

## 2024-09-05 PROCEDURE — 84484 ASSAY OF TROPONIN QUANT: CPT | Performed by: PHYSICIAN ASSISTANT

## 2024-09-05 PROCEDURE — 83880 ASSAY OF NATRIURETIC PEPTIDE: CPT | Performed by: PHYSICIAN ASSISTANT

## 2024-09-05 PROCEDURE — 80053 COMPREHEN METABOLIC PANEL: CPT | Performed by: PHYSICIAN ASSISTANT

## 2024-09-05 RX ORDER — HYDROCODONE BITARTRATE AND ACETAMINOPHEN 5; 325 MG/1; MG/1
1 TABLET ORAL EVERY 4 HOURS PRN
Status: DISCONTINUED | OUTPATIENT
Start: 2024-09-05 | End: 2024-09-05

## 2024-09-05 RX ORDER — SODIUM CHLORIDE 0.9 % (FLUSH) 0.9 %
10 SYRINGE (ML) INJECTION
Status: DISCONTINUED | OUTPATIENT
Start: 2024-09-05 | End: 2024-09-10 | Stop reason: HOSPADM

## 2024-09-05 RX ORDER — HYDROMORPHONE HYDROCHLORIDE 2 MG/ML
0.5 INJECTION, SOLUTION INTRAMUSCULAR; INTRAVENOUS; SUBCUTANEOUS
Status: COMPLETED | OUTPATIENT
Start: 2024-09-05 | End: 2024-09-05

## 2024-09-05 RX ORDER — DIPHENHYDRAMINE HYDROCHLORIDE 50 MG/ML
25 INJECTION INTRAMUSCULAR; INTRAVENOUS
Status: COMPLETED | OUTPATIENT
Start: 2024-09-05 | End: 2024-09-05

## 2024-09-05 RX ADMIN — HYDROMORPHONE HYDROCHLORIDE 0.5 MG: 2 INJECTION INTRAMUSCULAR; INTRAVENOUS; SUBCUTANEOUS at 11:09

## 2024-09-05 RX ADMIN — DIPHENHYDRAMINE HYDROCHLORIDE 25 MG: 50 INJECTION INTRAMUSCULAR; INTRAVENOUS at 11:09

## 2024-09-05 NOTE — ASSESSMENT & PLAN NOTE
Swelling is bilateral but worse on right  Will obtain venous US of right lower extremity   Advised patient if pain worsens and weakness worsens to go to ED for evaluation

## 2024-09-05 NOTE — TELEPHONE ENCOUNTER
Noted and patient was advised to go through triage and Dr. Hamlin will follow up once she gets admitted

## 2024-09-05 NOTE — TELEPHONE ENCOUNTER
----- Message from Apryl Ervin sent at 9/4/2024  4:58 PM CDT -----  .Type:  Patient Returning Call    Who Called:pt  Who Left Message for Patient:PT  Does the patient know what this is regarding?:Ultra Sound  Would the patient rather a call back or a response via [facility]?   Best Call Back Number:[phone]  Additional Information: Please call back about a ultra sound for a blood clot that [facility] can't see her until October and she is in a lot of pain. I did get her on with the on call nurse.

## 2024-09-05 NOTE — ED NOTES
Lobby Round. Pt sitting in wheelchair alert and oriented at this time. Vitals assessed. Family noted at pt side. Pt denies any changes from initial assessment at this time. Pt does not appear to be in any immediate distress at this time.

## 2024-09-05 NOTE — ED NOTES
Lobby round. Pt sitting in main lobby alert and oriented at this time. Pt family at pt side. Pt reports she called her doctor and asked for nursing staff to contact her in regards to pain management. Pt updated accordingly. Vitals assessed. Pt does not appear to be in any immediate distress at this time.

## 2024-09-05 NOTE — ASSESSMENT & PLAN NOTE
Patient is concerned about unintentional weight loss due to decreased appetite   Has been referred to Dietitian by Heme/Onc

## 2024-09-05 NOTE — PROGRESS NOTES
Subjective:      Amber Martínez  09/05/2024  [id number]      Chief Complaint: Establish Care (NP EST MD)       HPI:  Ms Clark is a 52 y/o female patient who is here to establish care. Patient has hypertension, chronic ITP, GERD. Patient is complaining of swelling and pain of bilateral lower extremities that started few months ago, patient has been seen by Rheumatology and states had extensive work up done then but have not received results yet. Patient states she can feel what she believes are enlarged lymph nodes on her lower extremities. Patient has been seen in ED several times but still is not clear what is causing pain and swelling. Patient is not able to walk due to pain of lower extremities.     Past Medical History:   Diagnosis Date    Chronic ITP (idiopathic thrombocytopenia)     Crohn's disease     GERD (gastroesophageal reflux disease)     Hypertension     Leukopenia     Psychosomatic disorder     Severe anxiety      Past Surgical History:   Procedure Laterality Date    BONE MARROW BIOPSY  06/13/2016    DILATION AND CURETTAGE OF UTERUS  10/31/2018    PARTIAL HYSTERECTOMY       Family History   Problem Relation Name Age of Onset    Diabetes Mother      Hypertension Mother      Heart attack Mother      Heart disease Mother      Diabetes Father      Brain cancer Father      ALYSE disease Father      Heart disease Sister      Diabetes Brother       Social History     Tobacco Use    Smoking status: Never    Smokeless tobacco: Never   Substance and Sexual Activity    Alcohol use: Never    Drug use: Never    Sexual activity: Not on file     Review of patient's allergies indicates:   Allergen Reactions    Ketorolac      Other reaction(s): SOB, Stops breathing    Prednisone Shortness Of Breath     Other reaction(s): SOB    Amoxicillin-pot clavulanate      Other reaction(s): Hives, RASH    Azithromycin      Other reaction(s): RASH    Ciprofloxacin      Other reaction(s): Hives, RASH    Clindamycin      Other  reaction(s): Rash    Diphenoxylate-atropine      Other reaction(s): Hives, RASH    Hydrocodone-acetaminophen      Other reaction(s): Hives, hives, SWELLING, tongue swelling    Iodine      Other reaction(s): Difficulty breathing, Hives, SOB, Swelling    Penicillin      Other reaction(s): Hives, RASH    Shellfish containing products      Other reaction(s): Hives, RASH    Sulfamethoxazole-trimethoprim      Other reaction(s): Hives, RASH    Acetaminophen      Hives    Atropine      Hives    Cefuroxime      Hives    Clonazepam Hives    Diphenoxylate      Hives    Doxycycline     Famotidine      Other Reaction(s): Unknown    Hydrocodone      Hives    Povidone-iodine      Other Reaction(s): Unknown    Sulfamethoxazole      Hives    Trimethoprim      Hives    Zoloft [sertraline] Other (See Comments)     Serotonin syndrome        The following were reviewed at this visit: active problem list, medication list, allergies, family history, social history, and health maintenance.    Medications:    Current Outpatient Medications:     amLODIPine (NORVASC) 5 MG tablet, Take 5 mg by mouth once daily., Disp: , Rfl:     clonazePAM (KLONOPIN) 0.5 MG tablet, Take 0.5 mg by mouth 2 (two) times daily. BRAND NAME ONLY, Disp: , Rfl:     estradioL (VAGIFEM) 10 mcg Tab, Place 1 tablet vaginally. 2 times weekly, Disp: , Rfl:     pantoprazole (PROTONIX) 40 MG tablet, Take 1 tablet (40 mg total) by mouth once daily., Disp: 30 tablet, Rfl: 0      Medications have been reviewed and reconciled with patient at this visit.  Barriers to medications reviewed with patient.    Adverse reactions to current medications reviewed with patient..    Over the counter medications reviewed and reconciled with patient.  Review of Systems   Constitutional:  Negative for chills, fever, malaise/fatigue and weight loss.   HENT:  Negative for congestion, ear discharge, ear pain, hearing loss, sinus pain and sore throat.    Eyes:  Negative for photophobia, pain,  "discharge and redness.   Respiratory:  Negative for cough, shortness of breath and wheezing.    Cardiovascular:  Positive for leg swelling. Negative for chest pain and palpitations.   Gastrointestinal:  Negative for constipation, diarrhea, heartburn, nausea and vomiting.   Genitourinary:  Negative for dysuria, frequency and urgency.   Musculoskeletal:  Negative for falls, joint pain and myalgias.   Skin:  Negative for itching and rash.   Neurological:  Negative for dizziness, focal weakness, weakness and headaches.   Psychiatric/Behavioral:  Negative for depression and memory loss. The patient is not nervous/anxious and does not have insomnia.            Objective:      Vitals:    09/03/24 0915   BP: 135/81   BP Location: Right arm   Pulse: 86   SpO2: 100%   Height: 5' 5" (1.651 m)       Physical Exam  Constitutional:       General: She is not in acute distress.     Appearance: Normal appearance.   HENT:      Head: Normocephalic and atraumatic.   Eyes:      Extraocular Movements: Extraocular movements intact.      Pupils: Pupils are equal, round, and reactive to light.   Cardiovascular:      Rate and Rhythm: Normal rate and regular rhythm.      Pulses: Normal pulses.      Heart sounds: Normal heart sounds.   Pulmonary:      Effort: Pulmonary effort is normal.      Breath sounds: Normal breath sounds.   Abdominal:      General: Abdomen is flat. Bowel sounds are normal. There is no distension.      Palpations: Abdomen is soft.      Tenderness: There is no abdominal tenderness.   Musculoskeletal:         General: No swelling.      Cervical back: Normal range of motion and neck supple.      Right lower leg: Edema present.      Left lower leg: Edema present.   Lymphadenopathy:      Cervical: No cervical adenopathy.   Skin:     Findings: No lesion or rash.   Neurological:      General: No focal deficit present.      Mental Status: She is alert and oriented to person, place, and time.               Assessment and Plan:     "   1. Hypertension, unspecified type  Assessment & Plan:  Controlled  Continue amlodipine 5 mg daily       2. Leukopenia, unspecified type  Assessment & Plan:  Stable  Follows with Hematology     Orders:  -     Ambulatory referral/consult to Internal Medicine    3. Pain and swelling of lower extremity, right  Assessment & Plan:  Swelling is bilateral but worse on right  Will obtain venous US of right lower extremity   Advised patient if pain worsens and weakness worsens to go to ED for evaluation     Orders:  -     Cancel: US Lower Extremity Veins Right; Future; Expected date: 09/03/2024  -     US Lower Extremity Veins Right; Future; Expected date: 09/03/2024    4. Chronic idiopathic thrombocytopenic purpura  Assessment & Plan:  Stable per latest labs in July with platelets of 133  Follows with Hematology      5. Weight loss  Assessment & Plan:  Patient is concerned about unintentional weight loss due to decreased appetite   Has been referred to Dietitian by Heme/Onc       6. Gastroesophageal reflux disease, unspecified whether esophagitis present  Assessment & Plan:  Stable   Continue pantoprazole              Follow up: Follow up in about 1 week (around 9/10/2024) for f/u of lower extremity swelling.

## 2024-09-05 NOTE — TELEPHONE ENCOUNTER
Atc patient twice, no answer, lvm to cb, patient called back and she is on her way to [facility] now. Advised her to let triage know the problem and that Dr. Hamlin is aware she is going into the ED and that an US I needing to be done. If they have any questions they can call the office but she has to go through triage prior to anything being done.

## 2024-09-05 NOTE — TELEPHONE ENCOUNTER
I do not see patient went to the ED. Please call [facility] to have US lower extremity done today

## 2024-09-05 NOTE — TELEPHONE ENCOUNTER
----- Message from Apryl Ervin sent at 9/5/2024 12:10 PM CDT -----  .Type:  Patient Returning Call    Who Called:PT  Who Left Message for Patient:PT  Does the patient know what this is regarding?:call back  Would the patient rather a call back or a response via [facility]?   Best Call Back Number:[phone]  Additional Information: Please call back patient is in [facility]. Asking for a call back

## 2024-09-05 NOTE — TELEPHONE ENCOUNTER
Spoke to patient this morning, she did not go to ED last night but is waiting on ambulance to take her now. She states that the left leg is worse than the right, in a lot of pain.  She stated that she called the ED last night and whomever she spoke to was very rude and unprofessional, they told her that they do not know who Dr. Mackay is and that she is wasting her time going there.   I advised that Dr. Hamlin will be advised that she is on her way to the ED and if they have any questions regarding her illnesses they can call the office. Patient verbalized understanding

## 2024-09-06 ENCOUNTER — TELEPHONE (OUTPATIENT)
Dept: INTERNAL MEDICINE | Facility: CLINIC | Age: 54
End: 2024-09-06
Payer: MEDICARE

## 2024-09-06 ENCOUNTER — TELEPHONE (OUTPATIENT)
Dept: HEMATOLOGY/ONCOLOGY | Facility: CLINIC | Age: 54
End: 2024-09-06
Payer: MEDICARE

## 2024-09-06 PROBLEM — M79.605 BILATERAL LOWER EXTREMITY PAIN: Status: ACTIVE | Noted: 2024-09-05

## 2024-09-06 PROBLEM — D64.9 ANEMIA: Status: ACTIVE | Noted: 2024-09-06

## 2024-09-06 LAB
ABO + RH BLD: NORMAL
ABO + RH BLD: NORMAL
ALBUMIN SERPL-MCNC: 3.1 G/DL (ref 3.5–5)
ALBUMIN/GLOB SERPL: 0.9 RATIO (ref 1.1–2)
ALP SERPL-CCNC: 36 UNIT/L (ref 40–150)
ALT SERPL-CCNC: 10 UNIT/L (ref 0–55)
ANION GAP SERPL CALC-SCNC: 5 MEQ/L
AST SERPL-CCNC: 31 UNIT/L (ref 5–34)
BASOPHILS # BLD AUTO: 0.03 X10(3)/MCL
BASOPHILS # BLD AUTO: 0.04 X10(3)/MCL
BASOPHILS NFR BLD AUTO: 1 %
BASOPHILS NFR BLD AUTO: 1.1 %
BILIRUB SERPL-MCNC: 1.3 MG/DL
BLD PROD TYP BPU: NORMAL
BLD PROD TYP BPU: NORMAL
BLOOD UNIT EXPIRATION DATE: NORMAL
BLOOD UNIT EXPIRATION DATE: NORMAL
BLOOD UNIT TYPE CODE: 6200
BLOOD UNIT TYPE CODE: 6200
BUN SERPL-MCNC: 10.9 MG/DL (ref 9.8–20.1)
CALCIUM SERPL-MCNC: 9.6 MG/DL (ref 8.4–10.2)
CHLORIDE SERPL-SCNC: 104 MMOL/L (ref 98–107)
CO2 SERPL-SCNC: 27 MMOL/L (ref 22–29)
CREAT SERPL-MCNC: 0.99 MG/DL (ref 0.55–1.02)
CREAT/UREA NIT SERPL: 11
CROSSMATCH INTERPRETATION: NORMAL
CROSSMATCH INTERPRETATION: NORMAL
CRP SERPL-MCNC: 53.4 MG/L
DISPENSE STATUS: NORMAL
DISPENSE STATUS: NORMAL
EOSINOPHIL # BLD AUTO: 0.08 X10(3)/MCL (ref 0–0.9)
EOSINOPHIL # BLD AUTO: 0.09 X10(3)/MCL (ref 0–0.9)
EOSINOPHIL NFR BLD AUTO: 2.2 %
EOSINOPHIL NFR BLD AUTO: 2.8 %
ERYTHROCYTE [DISTWIDTH] IN BLOOD BY AUTOMATED COUNT: 12.8 % (ref 11.5–17)
ERYTHROCYTE [DISTWIDTH] IN BLOOD BY AUTOMATED COUNT: 13 % (ref 11.5–17)
ERYTHROCYTE [SEDIMENTATION RATE] IN BLOOD: 33 MM/HR (ref 0–20)
GFR SERPLBLD CREATININE-BSD FMLA CKD-EPI: >60 ML/MIN/1.73/M2
GLOBULIN SER-MCNC: 3.6 GM/DL (ref 2.4–3.5)
GLUCOSE SERPL-MCNC: 97 MG/DL (ref 74–100)
HCT VFR BLD AUTO: 20.4 % (ref 37–47)
HCT VFR BLD AUTO: 24.4 % (ref 37–47)
HGB BLD-MCNC: 6.5 G/DL (ref 12–16)
HGB BLD-MCNC: 7.9 G/DL (ref 12–16)
IMM GRANULOCYTES # BLD AUTO: 0.02 X10(3)/MCL (ref 0–0.04)
IMM GRANULOCYTES # BLD AUTO: 0.02 X10(3)/MCL (ref 0–0.04)
IMM GRANULOCYTES NFR BLD AUTO: 0.5 %
IMM GRANULOCYTES NFR BLD AUTO: 0.7 %
LYMPHOCYTES # BLD AUTO: 0.89 X10(3)/MCL (ref 0.6–4.6)
LYMPHOCYTES # BLD AUTO: 1.1 X10(3)/MCL (ref 0.6–4.6)
LYMPHOCYTES NFR BLD AUTO: 21.9 %
LYMPHOCYTES NFR BLD AUTO: 39 %
MCH RBC QN AUTO: 30 PG (ref 27–31)
MCH RBC QN AUTO: 30.2 PG (ref 27–31)
MCHC RBC AUTO-ENTMCNC: 31.9 G/DL (ref 33–36)
MCHC RBC AUTO-ENTMCNC: 32.4 G/DL (ref 33–36)
MCV RBC AUTO: 92.8 FL (ref 80–94)
MCV RBC AUTO: 94.9 FL (ref 80–94)
MONOCYTES # BLD AUTO: 0.27 X10(3)/MCL (ref 0.1–1.3)
MONOCYTES # BLD AUTO: 0.35 X10(3)/MCL (ref 0.1–1.3)
MONOCYTES NFR BLD AUTO: 8.6 %
MONOCYTES NFR BLD AUTO: 9.6 %
NEUTROPHILS # BLD AUTO: 1.32 X10(3)/MCL (ref 2.1–9.2)
NEUTROPHILS # BLD AUTO: 2.68 X10(3)/MCL (ref 2.1–9.2)
NEUTROPHILS NFR BLD AUTO: 46.8 %
NEUTROPHILS NFR BLD AUTO: 65.8 %
NRBC BLD AUTO-RTO: 0 %
NRBC BLD AUTO-RTO: 0 %
PLATELET # BLD AUTO: 148 X10(3)/MCL (ref 130–400)
PLATELET # BLD AUTO: 149 X10(3)/MCL (ref 130–400)
PLATELETS.RETICULATED NFR BLD AUTO: 3.7 % (ref 0.9–11.2)
PMV BLD AUTO: 10.6 FL (ref 7.4–10.4)
PMV BLD AUTO: 11 FL (ref 7.4–10.4)
POTASSIUM SERPL-SCNC: 4.9 MMOL/L (ref 3.5–5.1)
PROT SERPL-MCNC: 6.7 GM/DL (ref 6.4–8.3)
RBC # BLD AUTO: 2.15 X10(6)/MCL (ref 4.2–5.4)
RBC # BLD AUTO: 2.63 X10(6)/MCL (ref 4.2–5.4)
SODIUM SERPL-SCNC: 136 MMOL/L (ref 136–145)
UNIT NUMBER: NORMAL
UNIT NUMBER: NORMAL
WBC # BLD AUTO: 2.82 X10(3)/MCL (ref 4.5–11.5)
WBC # BLD AUTO: 4.07 X10(3)/MCL (ref 4.5–11.5)

## 2024-09-06 PROCEDURE — 86235 NUCLEAR ANTIGEN ANTIBODY: CPT | Performed by: STUDENT IN AN ORGANIZED HEALTH CARE EDUCATION/TRAINING PROGRAM

## 2024-09-06 PROCEDURE — 11000001 HC ACUTE MED/SURG PRIVATE ROOM

## 2024-09-06 PROCEDURE — 86140 C-REACTIVE PROTEIN: CPT | Performed by: STUDENT IN AN ORGANIZED HEALTH CARE EDUCATION/TRAINING PROGRAM

## 2024-09-06 PROCEDURE — 85025 COMPLETE CBC W/AUTO DIFF WBC: CPT | Performed by: STUDENT IN AN ORGANIZED HEALTH CARE EDUCATION/TRAINING PROGRAM

## 2024-09-06 PROCEDURE — 85025 COMPLETE CBC W/AUTO DIFF WBC: CPT | Performed by: PHYSICIAN ASSISTANT

## 2024-09-06 PROCEDURE — 86225 DNA ANTIBODY NATIVE: CPT | Performed by: STUDENT IN AN ORGANIZED HEALTH CARE EDUCATION/TRAINING PROGRAM

## 2024-09-06 PROCEDURE — 36430 TRANSFUSION BLD/BLD COMPNT: CPT

## 2024-09-06 PROCEDURE — 85652 RBC SED RATE AUTOMATED: CPT | Performed by: STUDENT IN AN ORGANIZED HEALTH CARE EDUCATION/TRAINING PROGRAM

## 2024-09-06 PROCEDURE — 30233N1 TRANSFUSION OF NONAUTOLOGOUS RED BLOOD CELLS INTO PERIPHERAL VEIN, PERCUTANEOUS APPROACH: ICD-10-PCS | Performed by: STUDENT IN AN ORGANIZED HEALTH CARE EDUCATION/TRAINING PROGRAM

## 2024-09-06 PROCEDURE — 99223 1ST HOSP IP/OBS HIGH 75: CPT | Mod: AI,,, | Performed by: STUDENT IN AN ORGANIZED HEALTH CARE EDUCATION/TRAINING PROGRAM

## 2024-09-06 PROCEDURE — 86036 ANCA SCREEN EACH ANTIBODY: CPT | Performed by: STUDENT IN AN ORGANIZED HEALTH CARE EDUCATION/TRAINING PROGRAM

## 2024-09-06 PROCEDURE — 86923 COMPATIBILITY TEST ELECTRIC: CPT | Performed by: STUDENT IN AN ORGANIZED HEALTH CARE EDUCATION/TRAINING PROGRAM

## 2024-09-06 PROCEDURE — 86039 ANTINUCLEAR ANTIBODIES (ANA): CPT | Performed by: STUDENT IN AN ORGANIZED HEALTH CARE EDUCATION/TRAINING PROGRAM

## 2024-09-06 PROCEDURE — P9016 RBC LEUKOCYTES REDUCED: HCPCS | Performed by: STUDENT IN AN ORGANIZED HEALTH CARE EDUCATION/TRAINING PROGRAM

## 2024-09-06 PROCEDURE — 36415 COLL VENOUS BLD VENIPUNCTURE: CPT | Performed by: STUDENT IN AN ORGANIZED HEALTH CARE EDUCATION/TRAINING PROGRAM

## 2024-09-06 PROCEDURE — 80053 COMPREHEN METABOLIC PANEL: CPT | Performed by: PHYSICIAN ASSISTANT

## 2024-09-06 RX ORDER — CLONAZEPAM 0.5 MG/1
0.5 TABLET ORAL 2 TIMES DAILY PRN
Status: DISCONTINUED | OUTPATIENT
Start: 2024-09-06 | End: 2024-09-06

## 2024-09-06 RX ORDER — AMLODIPINE BESYLATE 5 MG/1
5 TABLET ORAL DAILY
Status: DISCONTINUED | OUTPATIENT
Start: 2024-09-06 | End: 2024-09-10 | Stop reason: HOSPADM

## 2024-09-06 RX ORDER — CLONAZEPAM 0.5 MG/1
0.5 TABLET ORAL 2 TIMES DAILY
Status: DISCONTINUED | OUTPATIENT
Start: 2024-09-06 | End: 2024-09-10 | Stop reason: HOSPADM

## 2024-09-06 RX ORDER — HYDROCODONE BITARTRATE AND ACETAMINOPHEN 500; 5 MG/1; MG/1
TABLET ORAL
Status: DISCONTINUED | OUTPATIENT
Start: 2024-09-06 | End: 2024-09-10 | Stop reason: HOSPADM

## 2024-09-06 RX ORDER — AMLODIPINE BESYLATE 5 MG/1
5 TABLET ORAL DAILY PRN
Status: DISCONTINUED | OUTPATIENT
Start: 2024-09-06 | End: 2024-09-06

## 2024-09-06 RX ORDER — CLONAZEPAM 0.5 MG/1
0.5 TABLET ORAL 2 TIMES DAILY
Status: DISCONTINUED | OUTPATIENT
Start: 2024-09-06 | End: 2024-09-06

## 2024-09-06 NOTE — ASSESSMENT & PLAN NOTE
This is chronic  Follows with Hematology- seems have fluctuated between 2-3 in the last 3 months   Stable

## 2024-09-06 NOTE — TELEPHONE ENCOUNTER
"Patient wanted you to be aware that she is currently admitted @ main campus with a "leg infection".  "

## 2024-09-06 NOTE — NURSING
Nurses Note -- 4 Eyes      9/6/2024   09:00AM      Skin assessed during: Admit      [] No Altered Skin Integrity Present    []Prevention Measures Documented      [x] Yes- Altered Skin Integrity Present or Discovered   [x] LDA Added if Not in Epic (Describe Wound)   [x] New Altered Skin Integrity was Present on Admit and Documented in LDA   [x] Wound Image Taken    Wound Care Consulted? No    Attending Nurse:  Jeffrey Almanzar RN/Staff Member:   RAMONITA Gomez

## 2024-09-06 NOTE — ASSESSMENT & PLAN NOTE
Significant drop in Hb as compared to 1 month ago  Hb of 7.6 on admission, dropped to 6.5 this morning  Attempt to type and screen in ED but patient declined  Type and screen done this morning, 2 units PRBCs ordered, patient informed of plan and reason for transfusion and agrees to this  Will check CBC post transfusion   Occult blood in stool ordered, pending collection   Denies hematochezia or melena

## 2024-09-06 NOTE — ASSESSMENT & PLAN NOTE
This is chronic and has progressively worsened over the past 3 months- unable to ambulate due to pain  Unclear etiology- suspecting autoimmune, vasculitis process. Venous doppler US of bilateral lower extremities negative for DVT and venous insufficiency   CRP and ESR elevated   CHANTELL, DS DNA, ANCA pending   Patient has extensive medication allergy- would not take anything in addition to her home medications  Will need evaluation by PT

## 2024-09-06 NOTE — ED PROVIDER NOTES
Encounter Date: 9/5/2024       History     Chief Complaint   Patient presents with    Leg Swelling     Presents POV with c/o swelling to the bilateral LE and feet x2 months. Sent by [facility] for admission.      53-year-old  female with history of chronic idiopathic thrombocytopenia, GERD, HTN, leukopenia, psychosomatic disorder, severe anxiety, significant allergy profile, presents to the emergency room with 3 month history of swelling of bilateral lower extremities up to the groin with pain.  Patient states she has had swelling that has caused her to not be able to ambulate as well.  Patient is following up with Dr. Boubacar Valentino for this, who sent patient to the emergency room for further evaluation today.  Denies nausea, vomiting, fever, chills, chest pain, or difficulty breathing.  Patient says the pain takes her breath away.    The history is provided by the patient and a friend. No  was used.     Review of patient's allergies indicates:   Allergen Reactions    Ketorolac      Other reaction(s): SOB, Stops breathing    Prednisone Shortness Of Breath     Other reaction(s): SOB    Amoxicillin-pot clavulanate      Other reaction(s): Hives, RASH    Azithromycin      Other reaction(s): RASH    Ciprofloxacin      Other reaction(s): Hives, RASH    Clindamycin      Other reaction(s): Rash    Diphenoxylate-atropine      Other reaction(s): Hives, RASH    Hydrocodone-acetaminophen      Other reaction(s): Hives, hives, SWELLING, tongue swelling    Iodine      Other reaction(s): Difficulty breathing, Hives, SOB, Swelling    Penicillin      Other reaction(s): Hives, RASH    Shellfish containing products      Other reaction(s): Hives, RASH    Sulfamethoxazole-trimethoprim      Other reaction(s): Hives, RASH    Acetaminophen      Hives    Atropine      Hives    Cefuroxime      Hives    Clonazepam Hives    Diphenoxylate      Hives    Doxycycline     Famotidine      Other Reaction(s): Unknown     Hydrocodone      Hives    Povidone-iodine      Other Reaction(s): Unknown    Sulfamethoxazole      Hives    Trimethoprim      Hives    Zoloft [sertraline] Other (See Comments)     Serotonin syndrome      Past Medical History:   Diagnosis Date    Chronic ITP (idiopathic thrombocytopenia)     Crohn's disease     GERD (gastroesophageal reflux disease)     Hypertension     Leukopenia     Psychosomatic disorder     Severe anxiety      Past Surgical History:   Procedure Laterality Date    BONE MARROW BIOPSY  06/13/2016    DILATION AND CURETTAGE OF UTERUS  10/31/2018    PARTIAL HYSTERECTOMY       Family History   Problem Relation Name Age of Onset    Diabetes Mother      Hypertension Mother      Heart attack Mother      Heart disease Mother      Diabetes Father      Brain cancer Father      ALYSE disease Father      Heart disease Sister      Diabetes Brother       Social History     Tobacco Use    Smoking status: Never    Smokeless tobacco: Never   Substance Use Topics    Alcohol use: Never    Drug use: Never     Review of Systems   Constitutional: Negative.    HENT: Negative.     Eyes: Negative.    Respiratory: Negative.     Cardiovascular: Negative.    Gastrointestinal: Negative.    Genitourinary: Negative.    Musculoskeletal:  Positive for gait problem, joint swelling and myalgias.       Physical Exam     Initial Vitals [09/05/24 1319]   BP Pulse Resp Temp SpO2   139/75 84 19 99.1 °F (37.3 °C) 100 %      MAP       --         Physical Exam    Nursing note and vitals reviewed.  Constitutional: She appears well-developed and well-nourished. No distress.   HENT:   Head: Normocephalic and atraumatic.   Eyes: Conjunctivae, EOM and lids are normal. Pupils are equal, round, and reactive to light.   Neck: Neck supple.   Normal range of motion.  Cardiovascular:  Normal rate and regular rhythm.           No pitting edema with erythema to midthigh   Pulmonary/Chest: Effort normal and breath sounds normal.   Abdominal: Abdomen is  soft and flat. Bowel sounds are normal.   Musculoskeletal:      Cervical back: Normal range of motion and neck supple.     Neurological: She is alert and oriented to person, place, and time. She has normal strength. She is not disoriented. No cranial nerve deficit or sensory deficit. GCS eye subscore is 4. GCS verbal subscore is 5. GCS motor subscore is 6.   Skin: Skin is warm and intact.   Psychiatric: Her speech is normal and behavior is normal. Judgment and thought content normal. Her mood appears anxious. Cognition and memory are normal.         ED Course   Procedures  Labs Reviewed   COMPREHENSIVE METABOLIC PANEL - Abnormal       Result Value    Sodium 136      Potassium 4.3      Chloride 104      CO2 27      Glucose 127 (*)     Blood Urea Nitrogen 12.8      Creatinine 1.04 (*)     Calcium 10.1      Protein Total 7.1      Albumin 3.7      Globulin 3.4      Albumin/Globulin Ratio 1.1      Bilirubin Total 1.4      ALP 43      ALT 10      AST 16      eGFR >60      Anion Gap 5.0      BUN/Creatinine Ratio 12     CBC WITH DIFFERENTIAL - Abnormal    WBC 4.17 (*)     RBC 2.54 (*)     Hgb 7.6 (*)     Hct 24.0 (*)     MCV 94.5 (*)     MCH 29.9      MCHC 31.7 (*)     RDW 12.5      Platelet 159      MPV 10.5 (*)     Neut % 64.5      Lymph % 24.0      Mono % 8.2      Eos % 1.9      Basophil % 1.2      Lymph # 1.00      Neut # 2.69      Mono # 0.34      Eos # 0.08      Baso # 0.05      IG# 0.01      IG% 0.2      NRBC% 0.0     TROPONIN I - Normal    Troponin-I <0.010     B-TYPE NATRIURETIC PEPTIDE - Normal    Natriuretic Peptide <10.0     CBC W/ AUTO DIFFERENTIAL    Narrative:     The following orders were created for panel order CBC auto differential.  Procedure                               Abnormality         Status                     ---------                               -----------         ------                     CBC with Differential[[id number]]       Abnormal            Final result                 Please view  results for these tests on the individual orders.   OCCULT BLOOD, STOOL 1ST SPECIMEN   TYPE & SCREEN          Imaging Results    None          Medications - No data to display  Medical Decision Making  53-year-old  female with history of chronic idiopathic thrombocytopenia, GERD, HTN, leukopenia, psychosomatic disorder, severe anxiety, significant allergy profile, presents to the emergency room with 3 month history of swelling of bilateral lower extremities up to the groin with pain.  Patient states she has had swelling that has caused her to not be able to ambulate as well.  Patient is following up with Dr. Boubacar Valentino for this, who sent patient to the emergency room for further evaluation today.  Denies nausea, vomiting, fever, chills, chest pain, or difficulty breathing.  Patient says the pain takes her breath away.    Problems Addressed:  Lower extremity edema: chronic illness or injury with exacerbation, progression, or side effects of treatment     Details: Differential diagnosis included but not limited to:  Vasculitis, chronic edema, deep vein thrombosis, dependent edema, thrombocytopenia related side effects, CHF, MI    Shared decision making with patient and Dr. Otoole, who is on-call for Dr. Valnetino.    Amount and/or Complexity of Data Reviewed  Labs: ordered. Decision-making details documented in ED Course.  Radiology: ordered. Decision-making details documented in ED Course.               ED Course as of 09/05/24 2111   Thu Sep 05, 2024   2107 Explained to the patient that rectal exam recommended at this time to rule out blood coming from the GI tract.  Explained that her H and H is low.  Patient argued stating that she has chronic thrombocytopenia.  I explained however that her H&H was 12 and 38 in end of July of this year.  Today her numbers were 7 and 24 which can be significant.  Patient also refused type and screen.  I explained the type and screen was for possible transfusion if  needed.  Patient refused.  Patient again refused rectal exam. [ST]   2111 Hemoglobin(!): 7.6 [ST]   2111 Hematocrit(!): 24.0 [ST]   2111 BNP: <10.0 [ST]   2111 Troponin I: <0.010 [ST]      ED Course User Index  [ST] Michelle Rubi PA                           Clinical Impression:  Final diagnoses:  [I83.893] Varicose veins of leg with swelling, bilateral  [R60.0] Lower extremity edema (Primary)  [R60.0] Leg edema            This note was typed partially using voice recognition software.  Please be reminded that not all corrections/addendums to grammar may have been made prior to closing of this chart.       Michelle Rubi PA  09/05/24 2110       Michelle Rubi PA  09/05/24 2111

## 2024-09-06 NOTE — HPI
New Patient Office Visit      Subjective      Chief Complaint:  Rectal Bleeding (Blood in stool and patient est care with a new pcp)      History of Present Illness: Tj Ledezma is a 34 y.o. male who presents for a new patient visit.     Patient has rectal bleeding started 6 months. It increased recently. The blood is throughout the stool. Both dark red and bright red. He has blood in stool daily now. +blood in toilet. No weight loss or fevers. No known FH of colon cancer.     He is felt down lately but this started ever since his rectal bleeding.  He has had some fatigue.     PHQ-9 Depression Screening  Little interest or pleasure in doing things? 1-->several days   Feeling down, depressed, or hopeless? 2-->more than half the days   Trouble falling or staying asleep, or sleeping too much? 3-->nearly every day   Feeling tired or having little energy? 3-->nearly every day   Poor appetite or overeating? 3-->nearly every day   Feeling bad about yourself - or that you are a failure or have let yourself or your family down? 2-->more than half the days   Trouble concentrating on things, such as reading the newspaper or watching television? 1-->several days   Moving or speaking so slowly that other people could have noticed? Or the opposite - being so fidgety or restless that you have been moving around a lot more than usual? 0-->not at all   Thoughts that you would be better off dead, or of hurting yourself in some way? 0-->not at all   PHQ-9 Total Score 15   If you checked off any problems, how difficult have these problems made it for you to do your work, take care of things at home, or get along with other people? somewhat difficult         History reviewed. No pertinent past medical history.    Past Surgical History:   Procedure Laterality Date   • FRACTURE SURGERY  1/2000    surgery for leg curvature not fracture   • LYMPH NODE BIOPSY  1/2017    negative       Family History   Problem Relation Age of Onset   •  Ms Clark is a 54 y/o female patient with hypertension, GERD,  ITP, anxiety and chronic lower extremity pain and swelling that presented to ED with complaints of worsening lower extremity pain and weakness. Patient states she has not been able to ambulate due to pain of lower extremities. Patient recently established care with me, states symptoms started approximately 2 months ago with progressively worsened swelling and of lower extremities, has been seeing a Ortho physician and has hadextensive workup but does not have results for this, records were requested this week when patient was seen in office but have not been received yet. Patient states since office visit on Tuesday lower extremity pain and swelling continued to worsen where she could not stand due to pain, states right lower extremity swelling was worse than left. This morning swelling has improved, patient presents with petechial rash and erythema of bilateral lower extremities. In ED venous US of bilateral lower extremities was obtained which was negative for DVT or venous insufficiency. Admission labs significant for hemoglobin of 7.6 and hematocrit of 24.0. Platelets of 159. Patient has been admitted for anemia work up. This morning patient continues to complain of lower extremity pain, worse on right groin, states swelling has improved but still present and petechial rash and erythema still present.    "Hypertension Mother    • Migraines Mother    • Hypertension Maternal Grandmother    • Diabetes Maternal Grandmother        Social History     Socioeconomic History   • Marital status: Single   Tobacco Use   • Smoking status: Current Every Day Smoker     Packs/day: 0.25     Years: 5.00     Pack years: 1.25     Types: Cigarettes   • Smokeless tobacco: Never Used   Vaping Use   • Vaping Use: Never used   Substance and Sexual Activity   • Alcohol use: Yes     Alcohol/week: 4.0 standard drinks     Types: 4 Shots of liquor per week     Comment: ocassional   • Drug use: Never   • Sexual activity: Yes     Partners: Female         Current Outpatient Medications:   •  nicotine (Nicoderm CQ) 14 MG/24HR patch, Place 1 patch on the skin as directed by provider Daily., Disp: 28 each, Rfl: 1  •  nicotine (Nicoderm CQ) 7 MG/24HR patch, Place 1 patch on the skin as directed by provider Daily., Disp: 28 each, Rfl: 2    No Known Allergies    Objective     Physical Exam:  Vital Signs:  /94 (BP Location: Left arm, Patient Position: Sitting, Cuff Size: Adult)   Pulse 82   Temp 98.4 °F (36.9 °C) (Temporal)   Resp 18   Ht 185.4 cm (73\")   Wt 125 kg (276 lb)   SpO2 99%   BMI 36.41 kg/m²      Physical Exam  Constitutional:       General: He is not in acute distress.     Appearance: He is not ill-appearing.   Eyes:      Extraocular Movements: Extraocular movements intact.   Cardiovascular:      Rate and Rhythm: Normal rate and regular rhythm.      Heart sounds: No murmur heard.   Pulmonary:      Effort: Pulmonary effort is normal.      Breath sounds: Normal breath sounds.   Abdominal:      Palpations: Abdomen is soft. Non tender no mass   Skin:     General: Skin is warm and dry.   Neurological:      Mental Status: He is alert.   Psychiatric:         Thought Content: Thought content normal.     CHARLEEN wNL normal rectal tone no mass normal external exam. No blood on glove.          Assessment / Plan      Assessment/Plan:   Diagnoses " and all orders for this visit:    1. Rectal bleeding  -     CBC & Differential  -     Comprehensive Metabolic Panel  -     C-reactive protein  -     Iron Profile; Future  -     Cancel: Celiac Disease Panel; Future  -     Celiac Disease Panel  -     Ambulatory Referral to Gastroenterology  -     Patient with 6 months of bleeding now worsening.  He has some associated fatigue.  Check labs above refer to gastroenterology for colonoscopy.    2. Continuous dependence on cigarette smoking  -     nicotine (Nicoderm CQ) 14 MG/24HR patch; Place 1 patch on the skin as directed by provider Daily.  Dispense: 28 each; Refill: 1  -     nicotine (Nicoderm CQ) 7 MG/24HR patch; Place 1 patch on the skin as directed by provider Daily.  Dispense: 28 each; Refill: 2  -     I have educated the patient on the risk of diseases from using tobacco products including lung disease, cancers, and vascular disease. I advised the patient to quit. Smoking cessation handout provided.  We discussed cessation options including cold turkey, taper, nicotine replacement versus prescription options.  Patient quit date is  Sept 10th.  Patient will return in less than 6 months to evaluate progress. I spent 4 minutes counseling the patient.  Nicotine patches prescribed    3. Screening, lipid  -     Lipid Panel    4. Weight gain  -     TSH Rfx On Abnormal To Free T4    5. Increased thirst  -     Cancel: Hemoglobin A1c; Future  -     Hemoglobin A1c    6. Elevated blood pressure reading  -Monitor at home.  DASH diet.  Follow-up in 3 months    7. Obesity (BMI 30.0-34.9)  -Discussed healthy diet and exercise. Hand out given.     8.  Depressed mood  -I think is likely has physiologic cause and may have anemia.  Follow-up in 3 months.  No indication for medication call if worsening.          Follow Up:   Return in about 3 months (around 11/26/2022) for Wellness visit.    MDM: New problem uncertain prognosis requiring further evaluation.  Labs.    Amarjit Hua  MD  Family Medicine - [facility] [place]

## 2024-09-06 NOTE — TELEPHONE ENCOUNTER
----- [facility] 9[facility]6/2024  9:36 AM CDT -----  .Type:  Needs Medical Advice    Who Called:  Georgie nurse on 5th floor     Symptoms (please be specific):  no     How long has patient had these symptoms:   no    Pharmacy name and phone #:   no    Would the patient rather a call back or a response via [facility]?      Best Call Back Number:  [phone]    Additional Information:  pt is in the hospital on 5th floor at [facility] the nurse is asking for someone to call and say pt can resume her medication she needs the ok to resume medication

## 2024-09-06 NOTE — TELEPHONE ENCOUNTER
----- Message from Flower Lundy sent at 9/6/2024  9:00 AM CDT -----  Who Called: Amber Martínez    What order is the patient requesting: labs   When does the expect the orders to be performed? [address]         Preferred Method of Contact: Phone Call  Patient's Preferred Phone Number on File: [phone]   Best Call Back Number, if different:  Additional Information:   lab request, Deirdre [address] [phone]  pt (RM # [phone]) called to request labs ASAP, ABL twice no answer, please advise, thanks

## 2024-09-06 NOTE — H&P
[facility] - 5th Floor [facility] Medicine  History & Physical    Patient Name: Amber Martínez  MRN: [id number]  Patient Class: OP- Observation  Admission Date: 9/5/2024  Attending Physician: Elle Marc, *   Primary Care Provider: Elle Marc MD         Patient information was obtained from patient and ER records.     Subjective:     Principal Problem:Bilateral lower extremity pain    Chief Complaint:   Chief Complaint   Patient presents with    Leg Swelling     Presents POV with c/o swelling to the bilateral LE and feet x2 months. Sent by Codie for admission.         HPI: Ms Clark is a 54 y/o female patient with hypertension, GERD,  ITP, anxiety and chronic lower extremity pain and swelling that presented to ED with complaints of worsening lower extremity pain and weakness. Patient states she has not been able to ambulate due to pain of lower extremities. Patient recently established care with me, states symptoms started approximately 2 months ago with progressively worsened swelling and of lower extremities, has been seeing a Ortho physician and has hadextensive workup but does not have results for this, records were requested this week when patient was seen in office but have not been received yet. Patient states since office visit on Tuesday lower extremity pain and swelling continued to worsen where she could not stand due to pain, states right lower extremity swelling was worse than left. This morning swelling has improved, patient presents with petechial rash and erythema of bilateral lower extremities. In ED venous US of bilateral lower extremities was obtained which was negative for DVT or venous insufficiency. Admission labs significant for hemoglobin of 7.6 and hematocrit of 24.0. Platelets of 159. Patient has been admitted for anemia work up. This morning patient continues to complain of lower extremity pain, worse on right groin, states swelling  has improved but still present and petechial rash and erythema still present.     Past Medical History:   Diagnosis Date    Chronic ITP (idiopathic thrombocytopenia)     Crohn's disease     GERD (gastroesophageal reflux disease)     Hypertension     Leukopenia     Psychosomatic disorder     Severe anxiety        Past Surgical History:   Procedure Laterality Date    BONE MARROW BIOPSY  06/13/2016    DILATION AND CURETTAGE OF UTERUS  10/31/2018    PARTIAL HYSTERECTOMY         Review of patient's allergies indicates:   Allergen Reactions    Ketorolac      Other reaction(s): SOB, Stops breathing    Prednisone Shortness Of Breath     Other reaction(s): SOB    Amoxicillin-pot clavulanate      Other reaction(s): Hives, RASH    Azithromycin      Other reaction(s): RASH    Ciprofloxacin      Other reaction(s): Hives, RASH    Clindamycin      Other reaction(s): Rash    Diphenoxylate-atropine      Other reaction(s): Hives, RASH    Hydrocodone-acetaminophen      Home med--known to tolerate  Ho    Iodine      Other reaction(s): Difficulty breathing, Hives, SOB, Swelling    Penicillin      Other reaction(s): Hives, RASH    Shellfish containing products      Other reaction(s): Hives, RASH    Sulfamethoxazole-trimethoprim      Other reaction(s): Hives, RASH    Acetaminophen      Hives    Atropine      Hives    Cefuroxime      Hives    Clonazepam Hives    Diphenoxylate      Hives    Doxycycline     Famotidine      Other Reaction(s): Unknown    Hydrocodone      Hives    Povidone-iodine      Other Reaction(s): Unknown    Sulfamethoxazole      Hives    Trimethoprim      Hives    Zoloft [sertraline] Other (See Comments)     Serotonin syndrome        No current facility-administered medications on file prior to encounter.     Current Outpatient Medications on File Prior to Encounter   Medication Sig    clonazePAM (KLONOPIN) 0.5 MG tablet Take 0.5 mg by mouth 2 (two) times daily. BRAND NAME ONLY  Second dose not taken on Tuesdays and  Fridays    amLODIPine (NORVASC) 5 MG tablet Take 5 mg by mouth daily as needed (taken if the pressure is high).    estradioL (VAGIFEM) 10 mcg Tab Place 1 tablet vaginally twice a week. Tuesdays and Friday at 9:00 pm    pantoprazole (PROTONIX) 40 MG tablet Take 1 tablet (40 mg total) by mouth once daily. (Patient taking differently: Take 40 mg by mouth once daily. At 4:30 am)     Family History       Problem Relation (Age of Onset)    Brain cancer Father    Diabetes Mother, Father, Brother    ALSYE disease Father    Heart attack Mother    Heart disease Mother, Sister    Hypertension Mother          Tobacco Use    Smoking status: Never    Smokeless tobacco: Never   Substance and Sexual Activity    Alcohol use: Never    Drug use: Never    Sexual activity: Not on file     Review of Systems   Constitutional:  Negative for chills, fatigue and fever.   HENT:  Negative for congestion, rhinorrhea and sore throat.    Respiratory:  Negative for cough, chest tightness and shortness of breath.    Cardiovascular:  Positive for leg swelling. Negative for chest pain and palpitations.   Gastrointestinal:  Negative for abdominal distention, abdominal pain, constipation, diarrhea, nausea and vomiting.   Genitourinary:  Negative for dysuria.   Musculoskeletal:  Negative for arthralgias, back pain and joint swelling.   Neurological:  Negative for dizziness and headaches.   Psychiatric/Behavioral:  Negative for agitation and confusion.      Objective:     Vital Signs (Most Recent):  Temp: 98.1 °F (36.7 °C) (09/06/24 1100)  Pulse: 82 (09/06/24 1100)  Resp: 18 (09/06/24 1100)  BP: 130/74 (09/06/24 1100)  SpO2: 100 % (09/06/24 1100) Vital Signs (24h Range):  Temp:  [97.7 °F (36.5 °C)-98.2 °F (36.8 °C)] 98.1 °F (36.7 °C)  Pulse:  [65-94] 82  Resp:  [14-25] 18  SpO2:  [95 %-100 %] 100 %  BP: (113-155)/(50-95) 130/74     Weight: 56.7 kg (125 lb)  Body mass index is 20.8 kg/m².     Physical Exam  Constitutional:       General: She is not in acute  distress.     Appearance: Normal appearance.   HENT:      Head: Normocephalic and atraumatic.   Eyes:      Extraocular Movements: Extraocular movements intact.      Pupils: Pupils are equal, round, and reactive to light.   Cardiovascular:      Rate and Rhythm: Normal rate and regular rhythm.      Pulses: Normal pulses.      Heart sounds: Normal heart sounds.   Pulmonary:      Effort: Pulmonary effort is normal.      Breath sounds: Normal breath sounds.   Abdominal:      General: Abdomen is flat. Bowel sounds are normal. There is no distension.      Palpations: Abdomen is soft.      Tenderness: There is no abdominal tenderness.   Musculoskeletal:      Cervical back: Normal range of motion and neck supple.      Right lower leg: Edema present.      Left lower leg: Edema present.      Comments: Petechial rash and erythema of bilateral lower extremities    Skin:     Findings: No lesion or rash.   Neurological:      General: No focal deficit present.      Mental Status: She is alert and oriented to person, place, and time.              CRANIAL NERVES     CN III, IV, VI   Pupils are equal, round, and reactive to light.       Significant Labs: All pertinent labs within the past 24 hours have been reviewed.    Significant Imaging: I have reviewed all pertinent imaging results/findings within the past 24 hours.  Assessment/Plan:     * Bilateral lower extremity pain  This is chronic and has progressively worsened over the past 3 months- unable to ambulate due to pain  Unclear etiology- suspecting autoimmune, vasculitis process. Venous doppler US of bilateral lower extremities negative for DVT and venous insufficiency   CRP and ESR elevated   CHANTELL, DS DNA, ANCA pending   Patient has extensive medication allergy- would not take anything in addition to her home medications  Will need evaluation by PT        Anemia  Significant drop in Hb as compared to 1 month ago  Hb of 7.6 on admission, dropped to 6.5 this morning  Attempt to  type and screen in ED but patient declined  Type and screen done this morning, 2 units PRBCs ordered, patient informed of plan and reason for transfusion and agrees to this  Will check CBC post transfusion   Occult blood in stool ordered, pending collection   Denies hematochezia or melena     Leukopenia  This is chronic  Follows with Hematology- seems have fluctuated between 2-3 in the last 3 months   Stable       Hypertension  Continue amlodipine 5 mg daily     Chronic idiopathic thrombocytopenic purpura  Platelets of 159, will monitor  No signs of bleeding  Follows with Hematology as outpatient       VTE Risk Mitigation (From admission, onward)           Ordered     IP VTE HIGH RISK PATIENT  Once         09/05/24 2115                              Elle Mackay MD  Department of Hospital Medicine  [facility] - 5th Floor Med Surg

## 2024-09-06 NOTE — ED NOTES
Called and spoke with pharmacist Cem to review allergies and get recommendations for pain medications. Recommends giving hydromorphone but could be cross allergens. Discussed pre-medicating with an antihistamine and monitoring for reactions.

## 2024-09-06 NOTE — SUBJECTIVE & OBJECTIVE
Past Medical History:   Diagnosis Date    Chronic ITP (idiopathic thrombocytopenia)     Crohn's disease     GERD (gastroesophageal reflux disease)     Hypertension     Leukopenia     Psychosomatic disorder     Severe anxiety        Past Surgical History:   Procedure Laterality Date    BONE MARROW BIOPSY  06/13/2016    DILATION AND CURETTAGE OF UTERUS  10/31/2018    PARTIAL HYSTERECTOMY         Review of patient's allergies indicates:   Allergen Reactions    Ketorolac      Other reaction(s): SOB, Stops breathing    Prednisone Shortness Of Breath     Other reaction(s): SOB    Amoxicillin-pot clavulanate      Other reaction(s): Hives, RASH    Azithromycin      Other reaction(s): RASH    Ciprofloxacin      Other reaction(s): Hives, RASH    Clindamycin      Other reaction(s): Rash    Diphenoxylate-atropine      Other reaction(s): Hives, RASH    Hydrocodone-acetaminophen      Home med--known to tolerate  Ho    Iodine      Other reaction(s): Difficulty breathing, Hives, SOB, Swelling    Penicillin      Other reaction(s): Hives, RASH    Shellfish containing products      Other reaction(s): Hives, RASH    Sulfamethoxazole-trimethoprim      Other reaction(s): Hives, RASH    Acetaminophen      Hives    Atropine      Hives    Cefuroxime      Hives    Clonazepam Hives    Diphenoxylate      Hives    Doxycycline     Famotidine      Other Reaction(s): Unknown    Hydrocodone      Hives    Povidone-iodine      Other Reaction(s): Unknown    Sulfamethoxazole      Hives    Trimethoprim      Hives    Zoloft [sertraline] Other (See Comments)     Serotonin syndrome        No current facility-administered medications on file prior to encounter.     Current Outpatient Medications on File Prior to Encounter   Medication Sig    clonazePAM (KLONOPIN) 0.5 MG tablet Take 0.5 mg by mouth 2 (two) times daily. BRAND NAME ONLY  Second dose not taken on Tuesdays and Fridays    amLODIPine (NORVASC) 5 MG tablet Take 5 mg by mouth daily as needed (taken  if the pressure is high).    estradioL (VAGIFEM) 10 mcg Tab Place 1 tablet vaginally twice a week. Tuesdays and Friday at 9:00 pm    pantoprazole (PROTONIX) 40 MG tablet Take 1 tablet (40 mg total) by mouth once daily. (Patient taking differently: Take 40 mg by mouth once daily. At 4:30 am)     Family History       Problem Relation (Age of Onset)    Brain cancer Father    Diabetes Mother, Father, Brother    ALYSE disease Father    Heart attack Mother    Heart disease Mother, Sister    Hypertension Mother          Tobacco Use    Smoking status: Never    Smokeless tobacco: Never   Substance and Sexual Activity    Alcohol use: Never    Drug use: Never    Sexual activity: Not on file     Review of Systems   Constitutional:  Negative for chills, fatigue and fever.   HENT:  Negative for congestion, rhinorrhea and sore throat.    Respiratory:  Negative for cough, chest tightness and shortness of breath.    Cardiovascular:  Positive for leg swelling. Negative for chest pain and palpitations.   Gastrointestinal:  Negative for abdominal distention, abdominal pain, constipation, diarrhea, nausea and vomiting.   Genitourinary:  Negative for dysuria.   Musculoskeletal:  Negative for arthralgias, back pain and joint swelling.   Neurological:  Negative for dizziness and headaches.   Psychiatric/Behavioral:  Negative for agitation and confusion.      Objective:     Vital Signs (Most Recent):  Temp: 98.1 °F (36.7 °C) (09/06/24 1100)  Pulse: 82 (09/06/24 1100)  Resp: 18 (09/06/24 1100)  BP: 130/74 (09/06/24 1100)  SpO2: 100 % (09/06/24 1100) Vital Signs (24h Range):  Temp:  [97.7 °F (36.5 °C)-98.2 °F (36.8 °C)] 98.1 °F (36.7 °C)  Pulse:  [65-94] 82  Resp:  [14-25] 18  SpO2:  [95 %-100 %] 100 %  BP: (113-155)/(50-95) 130/74     Weight: 56.7 kg (125 lb)  Body mass index is 20.8 kg/m².     Physical Exam  Constitutional:       General: She is not in acute distress.     Appearance: Normal appearance.   HENT:      Head: Normocephalic and  atraumatic.   Eyes:      Extraocular Movements: Extraocular movements intact.      Pupils: Pupils are equal, round, and reactive to light.   Cardiovascular:      Rate and Rhythm: Normal rate and regular rhythm.      Pulses: Normal pulses.      Heart sounds: Normal heart sounds.   Pulmonary:      Effort: Pulmonary effort is normal.      Breath sounds: Normal breath sounds.   Abdominal:      General: Abdomen is flat. Bowel sounds are normal. There is no distension.      Palpations: Abdomen is soft.      Tenderness: There is no abdominal tenderness.   Musculoskeletal:      Cervical back: Normal range of motion and neck supple.      Right lower leg: Edema present.      Left lower leg: Edema present.      Comments: Petechial rash and erythema of bilateral lower extremities    Skin:     Findings: No lesion or rash.   Neurological:      General: No focal deficit present.      Mental Status: She is alert and oriented to person, place, and time.              CRANIAL NERVES     CN III, IV, VI   Pupils are equal, round, and reactive to light.       Significant Labs: All pertinent labs within the past 24 hours have been reviewed.    Significant Imaging: I have reviewed all pertinent imaging results/findings within the past 24 hours.

## 2024-09-06 NOTE — TELEPHONE ENCOUNTER
----- Message from Apryl Ervin sent at 9/6/2024  8:57 AM CDT -----  .Type:  Patient Returning Call    Who Called:PT  Who Left Message for Patient:PT  Does the patient know what this is regarding?:medication  Would the patient rather a call back or a response via [facility]? MANOJ  Best Call Back Number:[phone]  Additional Information: Please call back about her taking her medication at the same time as at home. Hospital not doing that and she is asking Dr to call them and advise. Just need an ok from .

## 2024-09-07 LAB
ALBUMIN SERPL-MCNC: 3 G/DL (ref 3.5–5)
ALBUMIN/GLOB SERPL: 1.1 RATIO (ref 1.1–2)
ALP SERPL-CCNC: 39 UNIT/L (ref 40–150)
ALT SERPL-CCNC: 10 UNIT/L (ref 0–55)
ANION GAP SERPL CALC-SCNC: 4 MEQ/L
AST SERPL-CCNC: 15 UNIT/L (ref 5–34)
BASOPHILS # BLD AUTO: 0.04 X10(3)/MCL
BASOPHILS NFR BLD AUTO: 1.4 %
BILIRUB SERPL-MCNC: 1.7 MG/DL
BUN SERPL-MCNC: 17.4 MG/DL (ref 9.8–20.1)
CALCIUM SERPL-MCNC: 9.5 MG/DL (ref 8.4–10.2)
CHLORIDE SERPL-SCNC: 105 MMOL/L (ref 98–107)
CO2 SERPL-SCNC: 27 MMOL/L (ref 22–29)
COLOR STL: NORMAL
CONSISTENCY STL: NORMAL
CREAT SERPL-MCNC: 0.91 MG/DL (ref 0.55–1.02)
CREAT/UREA NIT SERPL: 19
EOSINOPHIL # BLD AUTO: 0.09 X10(3)/MCL (ref 0–0.9)
EOSINOPHIL NFR BLD AUTO: 3.2 %
ERYTHROCYTE [DISTWIDTH] IN BLOOD BY AUTOMATED COUNT: 13.5 % (ref 11.5–17)
GFR SERPLBLD CREATININE-BSD FMLA CKD-EPI: >60 ML/MIN/1.73/M2
GLOBULIN SER-MCNC: 2.7 GM/DL (ref 2.4–3.5)
GLUCOSE SERPL-MCNC: 94 MG/DL (ref 74–100)
HCT VFR BLD AUTO: 28.5 % (ref 37–47)
HEMOCCULT SP1 STL QL: NEGATIVE
HGB BLD-MCNC: 9.3 G/DL (ref 12–16)
IMM GRANULOCYTES # BLD AUTO: 0.02 X10(3)/MCL (ref 0–0.04)
IMM GRANULOCYTES NFR BLD AUTO: 0.7 %
LYMPHOCYTES # BLD AUTO: 1.03 X10(3)/MCL (ref 0.6–4.6)
LYMPHOCYTES NFR BLD AUTO: 36.8 %
MCH RBC QN AUTO: 30.4 PG (ref 27–31)
MCHC RBC AUTO-ENTMCNC: 32.6 G/DL (ref 33–36)
MCV RBC AUTO: 93.1 FL (ref 80–94)
MONOCYTES # BLD AUTO: 0.32 X10(3)/MCL (ref 0.1–1.3)
MONOCYTES NFR BLD AUTO: 11.4 %
NEUTROPHILS # BLD AUTO: 1.3 X10(3)/MCL (ref 2.1–9.2)
NEUTROPHILS NFR BLD AUTO: 46.5 %
NRBC BLD AUTO-RTO: 0 %
PLATELET # BLD AUTO: 140 X10(3)/MCL (ref 130–400)
PLATELETS.RETICULATED NFR BLD AUTO: 4.1 % (ref 0.9–11.2)
PMV BLD AUTO: 10.1 FL (ref 7.4–10.4)
POTASSIUM SERPL-SCNC: 4.5 MMOL/L (ref 3.5–5.1)
PROT SERPL-MCNC: 5.7 GM/DL (ref 6.4–8.3)
RBC # BLD AUTO: 3.06 X10(6)/MCL (ref 4.2–5.4)
RET# (OHS): 0.08 X10E6/UL (ref 0.02–0.08)
RETICULOCYTE COUNT AUTOMATED (OLG): 2.66 % (ref 1.1–2.1)
SODIUM SERPL-SCNC: 136 MMOL/L (ref 136–145)
WBC # BLD AUTO: 2.8 X10(3)/MCL (ref 4.5–11.5)

## 2024-09-07 PROCEDURE — 11000001 HC ACUTE MED/SURG PRIVATE ROOM

## 2024-09-07 PROCEDURE — 25000003 PHARM REV CODE 250: Performed by: INTERNAL MEDICINE

## 2024-09-07 PROCEDURE — 80053 COMPREHEN METABOLIC PANEL: CPT | Performed by: STUDENT IN AN ORGANIZED HEALTH CARE EDUCATION/TRAINING PROGRAM

## 2024-09-07 PROCEDURE — 25000003 PHARM REV CODE 250: Performed by: STUDENT IN AN ORGANIZED HEALTH CARE EDUCATION/TRAINING PROGRAM

## 2024-09-07 PROCEDURE — 36415 COLL VENOUS BLD VENIPUNCTURE: CPT | Performed by: STUDENT IN AN ORGANIZED HEALTH CARE EDUCATION/TRAINING PROGRAM

## 2024-09-07 PROCEDURE — 82272 OCCULT BLD FECES 1-3 TESTS: CPT | Performed by: PHYSICIAN ASSISTANT

## 2024-09-07 PROCEDURE — 85045 AUTOMATED RETICULOCYTE COUNT: CPT | Performed by: STUDENT IN AN ORGANIZED HEALTH CARE EDUCATION/TRAINING PROGRAM

## 2024-09-07 PROCEDURE — 97162 PT EVAL MOD COMPLEX 30 MIN: CPT

## 2024-09-07 PROCEDURE — 85025 COMPLETE CBC W/AUTO DIFF WBC: CPT | Performed by: STUDENT IN AN ORGANIZED HEALTH CARE EDUCATION/TRAINING PROGRAM

## 2024-09-07 PROCEDURE — 99232 SBSQ HOSP IP/OBS MODERATE 35: CPT | Mod: ,,, | Performed by: INTERNAL MEDICINE

## 2024-09-07 RX ORDER — PANTOPRAZOLE SODIUM 40 MG/1
40 TABLET, DELAYED RELEASE ORAL
Status: DISCONTINUED | OUTPATIENT
Start: 2024-09-08 | End: 2024-09-10 | Stop reason: HOSPADM

## 2024-09-07 RX ORDER — LOPERAMIDE HYDROCHLORIDE 2 MG/1
2 CAPSULE ORAL EVERY 8 HOURS PRN
Status: DISCONTINUED | OUTPATIENT
Start: 2024-09-07 | End: 2024-09-10 | Stop reason: HOSPADM

## 2024-09-07 RX ADMIN — LOPERAMIDE HYDROCHLORIDE 2 MG: 2 CAPSULE ORAL at 02:09

## 2024-09-07 RX ADMIN — CLONAZEPAM 0.5 MG: 0.5 TABLET ORAL at 08:09

## 2024-09-07 NOTE — ASSESSMENT & PLAN NOTE
Significant drop in Hb as compared to 1 month ago  Hb of 7.6 on admission, dropped to 6.5 this morning  Attempt to type and screen in ED but patient declined  Type and screen done this morning, 2 units PRBCs ordered, patient informed of plan and reason for transfusion and agrees to this  Will check CBC post transfusion   Occult blood in stool ordered, pending collection   Denies hematochezia or melena   FOBT negative, status post transfusion H&H responded appropriately.

## 2024-09-07 NOTE — NURSING
Patient refused to be turned throughout shift. Patient was educated on the importance of turning q 2h to prevent skin break down.

## 2024-09-07 NOTE — ASSESSMENT & PLAN NOTE
This is chronic and has progressively worsened over the past 3 months- unable to ambulate due to pain  Unclear etiology- suspecting autoimmune, vasculitis process. Venous doppler US of bilateral lower extremities negative for DVT and venous insufficiency   CRP and ESR elevated   CHANTELL, DS DNA, ANCA pending   Patient has extensive medication allergy- would not take anything in addition to her home medications  Will need evaluation by PT  Labs are still pending, sed rate is okay, CRP is elevated at 53.  ANCA and CHANTELL still pending

## 2024-09-07 NOTE — SUBJECTIVE & OBJECTIVE
Interval History:  Patient reports lower extremity still very painful.  Vasculitis workup is under way.  Overall stable    Review of Systems   Constitutional: Negative.  Negative for chills and fever.   HENT: Negative.     Eyes: Negative.    Respiratory: Negative.  Negative for cough and shortness of breath.    Cardiovascular: Negative.  Negative for chest pain, palpitations and leg swelling.   Gastrointestinal: Negative.  Negative for abdominal distention, abdominal pain, constipation, diarrhea, nausea and vomiting.   Endocrine: Negative.    Genitourinary: Negative.  Negative for dysuria and hematuria.   Musculoskeletal:  Positive for arthralgias and myalgias. Negative for back pain and joint swelling.   Skin: Negative.  Negative for rash.   Allergic/Immunologic: Negative.    Neurological: Negative.  Negative for dizziness, seizures and headaches.   Hematological: Negative.    Psychiatric/Behavioral: Negative.       Objective:     Vital Signs (Most Recent):  Temp: 98 °F (36.7 °C) (09/07/24 1529)  Pulse: 81 (09/07/24 1529)  Resp: 18 (09/07/24 1529)  BP: 135/66 (09/07/24 1529)  SpO2: 100 % (09/07/24 1529) Vital Signs (24h Range):  Temp:  [97.7 °F (36.5 °C)-99.4 °F (37.4 °C)] 98 °F (36.7 °C)  Pulse:  [55-81] 81  Resp:  [16-18] 18  SpO2:  [98 %-100 %] 100 %  BP: (117-153)/(66-80) 135/66     Weight: 56.7 kg (125 lb)  Body mass index is 20.8 kg/m².    Intake/Output Summary (Last 24 hours) at 9/7/2024 1606  Last data filed at 9/7/2024 1300  Gross per 24 hour   Intake 906.25 ml   Output --   Net 906.25 ml         Physical Exam  Eyes:      Pupils: Pupils are equal, round, and reactive to light.   Cardiovascular:      Rate and Rhythm: Normal rate.      Pulses: Normal pulses.      Heart sounds: No murmur heard.  Pulmonary:      Effort: Pulmonary effort is normal.      Breath sounds: Normal breath sounds.   Abdominal:      General: Abdomen is flat. Bowel sounds are normal. There is no distension.      Palpations: Abdomen is  soft.      Tenderness: There is no guarding.   Musculoskeletal:         General: Normal range of motion.      Cervical back: Normal range of motion and neck supple.   Skin:     General: Skin is warm.   Neurological:      General: No focal deficit present.      Mental Status: She is alert.   Psychiatric:         Mood and Affect: Mood normal.         Behavior: Behavior normal.             Significant Labs: All pertinent labs within the past 24 hours have been reviewed.  Recent Lab Results         09/07/24  0717   09/07/24  0234   09/06/24  1813        Immature Platelet Fraction   4.1   3.7       Albumin/Globulin Ratio   1.1         Albumin   3.0         ALP   39         ALT   10         Anion Gap   4.0         AST   15         Baso #   0.04   0.04       Basophil %   1.4   1.0       BILIRUBIN TOTAL   1.7         BUN   17.4         BUN/CREAT RATIO   19         Calcium   9.5         Chloride   105         CO2   27         Creatinine   0.91         eGFR   >60         Eos #   0.09   0.09       Eos %   3.2   2.2       Globulin, Total   2.7         Glucose   94         Hematocrit   28.5   24.4       Hemoglobin   9.3   7.9       Immature Grans (Abs)   0.02   0.02       Immature Granulocytes   0.7   0.5       Lymph #   1.03   0.89       LYMPH %   36.8   21.9       MCH   30.4   30.0       MCHC   32.6   32.4       MCV   93.1   92.8       Mono #   0.32   0.35       Mono %   11.4   8.6       MPV   10.1   10.6       Neut #   1.30   2.68       Neut %   46.5   65.8       nRBC   0.0   0.0       Occult Blood Negative           Platelet Count   140   148       Potassium   4.5         PROTEIN TOTAL   5.7         RBC   3.06   2.63       RDW   13.5   13.0       Retic   2.66         Retic Count Abs   0.0814         Sodium   136         Stool Color 1 Brown           Stool Consistancy 1 formed           WBC   2.80   4.07               Significant Imaging: I have reviewed all pertinent imaging results/findings within the past 24 hours.

## 2024-09-07 NOTE — PROGRESS NOTES
[facility] - 5th Floor [facility] Medicine  Progress Note    Patient Name: Amber Martínez  MRN: [id number]  Patient Class: IP- Inpatient   Admission Date: 9/5/2024  Length of Stay: 1 days  Attending Physician: Elle Marc, *  Primary Care Provider: Elle Marc MD        Subjective:     Principal Problem:Bilateral lower extremity pain        HPI:  Ms Clark is a 54 y/o female patient with hypertension, GERD,  ITP, anxiety and chronic lower extremity pain and swelling that presented to ED with complaints of worsening lower extremity pain and weakness. Patient states she has not been able to ambulate due to pain of lower extremities. Patient recently established care with me, states symptoms started approximately 2 months ago with progressively worsened swelling and of lower extremities, has been seeing a Ortho physician and has hadextensive workup but does not have results for this, records were requested this week when patient was seen in office but have not been received yet. Patient states since office visit on Tuesday lower extremity pain and swelling continued to worsen where she could not stand due to pain, states right lower extremity swelling was worse than left. This morning swelling has improved, patient presents with petechial rash and erythema of bilateral lower extremities. In ED venous US of bilateral lower extremities was obtained which was negative for DVT or venous insufficiency. Admission labs significant for hemoglobin of 7.6 and hematocrit of 24.0. Platelets of 159. Patient has been admitted for anemia work up. This morning patient continues to complain of lower extremity pain, worse on right groin, states swelling has improved but still present and petechial rash and erythema still present.     Overview/Hospital Course:  No notes on file    Interval History:  Patient reports lower extremity still very painful.  Vasculitis workup is under way.   Overall stable    Review of Systems   Constitutional: Negative.  Negative for chills and fever.   HENT: Negative.     Eyes: Negative.    Respiratory: Negative.  Negative for cough and shortness of breath.    Cardiovascular: Negative.  Negative for chest pain, palpitations and leg swelling.   Gastrointestinal: Negative.  Negative for abdominal distention, abdominal pain, constipation, diarrhea, nausea and vomiting.   Endocrine: Negative.    Genitourinary: Negative.  Negative for dysuria and hematuria.   Musculoskeletal:  Positive for arthralgias and myalgias. Negative for back pain and joint swelling.   Skin: Negative.  Negative for rash.   Allergic/Immunologic: Negative.    Neurological: Negative.  Negative for dizziness, seizures and headaches.   Hematological: Negative.    Psychiatric/Behavioral: Negative.       Objective:     Vital Signs (Most Recent):  Temp: 98 °F (36.7 °C) (09/07/24 1529)  Pulse: 81 (09/07/24 1529)  Resp: 18 (09/07/24 1529)  BP: 135/66 (09/07/24 1529)  SpO2: 100 % (09/07/24 1529) Vital Signs (24h Range):  Temp:  [97.7 °F (36.5 °C)-99.4 °F (37.4 °C)] 98 °F (36.7 °C)  Pulse:  [55-81] 81  Resp:  [16-18] 18  SpO2:  [98 %-100 %] 100 %  BP: (117-153)/(66-80) 135/66     Weight: 56.7 kg (125 lb)  Body mass index is 20.8 kg/m².    Intake/Output Summary (Last 24 hours) at 9/7/2024 1606  Last data filed at 9/7/2024 1300  Gross per 24 hour   Intake 906.25 ml   Output --   Net 906.25 ml         Physical Exam  Eyes:      Pupils: Pupils are equal, round, and reactive to light.   Cardiovascular:      Rate and Rhythm: Normal rate.      Pulses: Normal pulses.      Heart sounds: No murmur heard.  Pulmonary:      Effort: Pulmonary effort is normal.      Breath sounds: Normal breath sounds.   Abdominal:      General: Abdomen is flat. Bowel sounds are normal. There is no distension.      Palpations: Abdomen is soft.      Tenderness: There is no guarding.   Musculoskeletal:         General: Normal range of motion.       Cervical back: Normal range of motion and neck supple.   Skin:     General: Skin is warm.   Neurological:      General: No focal deficit present.      Mental Status: She is alert.   Psychiatric:         Mood and Affect: Mood normal.         Behavior: Behavior normal.             Significant Labs: All pertinent labs within the past 24 hours have been reviewed.  Recent Lab Results         09/07/24  0717   09/07/24  0234   09/06/24  1813        Immature Platelet Fraction   4.1   3.7       Albumin/Globulin Ratio   1.1         Albumin   3.0         ALP   39         ALT   10         Anion Gap   4.0         AST   15         Baso #   0.04   0.04       Basophil %   1.4   1.0       BILIRUBIN TOTAL   1.7         BUN   17.4         BUN/CREAT RATIO   19         Calcium   9.5         Chloride   105         CO2   27         Creatinine   0.91         eGFR   >60         Eos #   0.09   0.09       Eos %   3.2   2.2       Globulin, Total   2.7         Glucose   94         Hematocrit   28.5   24.4       Hemoglobin   9.3   7.9       Immature Grans (Abs)   0.02   0.02       Immature Granulocytes   0.7   0.5       Lymph #   1.03   0.89       LYMPH %   36.8   21.9       MCH   30.4   30.0       MCHC   32.6   32.4       MCV   93.1   92.8       Mono #   0.32   0.35       Mono %   11.4   8.6       MPV   10.1   10.6       Neut #   1.30   2.68       Neut %   46.5   65.8       nRBC   0.0   0.0       Occult Blood Negative           Platelet Count   140   148       Potassium   4.5         PROTEIN TOTAL   5.7         RBC   3.06   2.63       RDW   13.5   13.0       Retic   2.66         Retic Count Abs   0.0814         Sodium   136         Stool Color 1 Brown           Stool Consistancy 1 formed           WBC   2.80   4.07               Significant Imaging: I have reviewed all pertinent imaging results/findings within the past 24 hours.    Assessment/Plan:      * Bilateral lower extremity pain  This is chronic and has progressively worsened over the  past 3 months- unable to ambulate due to pain  Unclear etiology- suspecting autoimmune, vasculitis process. Venous doppler US of bilateral lower extremities negative for DVT and venous insufficiency   CRP and ESR elevated   CHANTELL, DS DNA, ANCA pending   Patient has extensive medication allergy- would not take anything in addition to her home medications  Will need evaluation by PT  Labs are still pending, sed rate is okay, CRP is elevated at 53.  ANCA and CHANTELL still pending      Anemia  Significant drop in Hb as compared to 1 month ago  Hb of 7.6 on admission, dropped to 6.5 this morning  Attempt to type and screen in ED but patient declined  Type and screen done this morning, 2 units PRBCs ordered, patient informed of plan and reason for transfusion and agrees to this  Will check CBC post transfusion   Occult blood in stool ordered, pending collection   Denies hematochezia or melena   FOBT negative, status post transfusion H&H responded appropriately.    Leukopenia  This is chronic  Follows with Hematology- seems have fluctuated between 2-3 in the last 3 months   Stable       Hypertension  Continue amlodipine 5 mg daily     Chronic idiopathic thrombocytopenic purpura  Platelets of 159, will monitor  No signs of bleeding  Follows with Hematology as outpatient       VTE Risk Mitigation (From admission, onward)           Ordered     IP VTE HIGH RISK PATIENT  Once         09/05/24 2115                    Discharge Planning   ENDY:      Code Status: Full Code   Is the patient medically ready for discharge?:     Reason for patient still in hospital (select all that apply): Treatment                     ROMEO BAUTISTA MD  Department of Hospital Medicine   [facility] - 5th Floor Med Surg

## 2024-09-07 NOTE — PT/OT/SLP EVAL
Physical Therapy Evaluation    Patient Name:  Amber Martínez   MRN:  [id number]    Recommendations:     Discharge therapy intensity: Moderate Intensity Therapy   Discharge Equipment Recommendations:  (TBD)   Barriers to discharge: Decreased caregiver support, Impaired mobility, and Ongoing medical needs    Assessment:     Amber Martínez is a 53 y.o. female admitted with a medical diagnosis of LE weakness/pain, anemia, leukopenia. She presents with the following impairments/functional limitations: weakness, impaired functional mobility, decreased safety awareness, impaired endurance, gait instability, impaired balance, decreased lower extremity function. Pt tolerated session fair, limited 2/2 R sided groin pain, TTP. Pt with significant weakness BLE during MMT; however, MMT inconsistent with functional performance in standing. Pt unable to perform full ROM against gravity RLE and unable to tolerate overpressure LLE; however, able to stand and take side steps without buckling/LOB. Inconsistencies could be due to pain reported in BLE. Pt lives with 2 sons (one is disabled) and has a friend who comes and checks on her. 3 month decline requiring friend to pick her up and put her on a rolling chair, which is how she gets around home throughout the day. Recommending moderate intensity therapy to improve mobility to assist in return to PLOF.     Rehab Prognosis: Fair; patient would benefit from acute skilled PT services to address these deficits and reach maximum level of function.    Recent Surgery: * No surgery found *      Plan:     During this hospitalization, patient would benefit from acute PT services 5 x/week to address the identified rehab impairments via therapeutic activities, gait training, therapeutic exercises and progress toward the following goals:    Plan of Care Expires:  10/12/24    Subjective     Chief Complaint: pain R groin  Patient/Family Comments/goals: to get stronger/walk  Pain/Comfort:  Pain  Rating 1: 7/10  Location - Side 1: Right  Location 1: groin    Patients cultural, spiritual, Jain conflicts given the current situation:      Living Environment:  Pt lives with sons in a SLH with 4 steps to enter without railings.   Prior to admission, patients level of function was decline over the past 3 months, non-ambulatory, pts friend who t/f her to a rolling chair in the morning and that's how she would get around home.  Equipment used at home:  (rolling chair).  DME owned (not currently used): none.  Upon discharge, patient will have assistance from TBD.    Objective:     Communicated with RN prior to session.  Patient found HOB elevated with peripheral IV  upon PT entry to room.    General Precautions: Standard, fall  Orthopedic Precautions:N/A   Braces: N/A  Respiratory Status: Room air      Exams:  Sensation:    -       Intact  RLE Strength: Deficits: 2+ hip flexion, 3- knee extension, 4- DF  LLE Strength: Deficits: 3- hip flexion, 3 knee extension, 4- DF  Skin integrity:  scattered bruising/rash noted to BLE distal to knee, redness distal to shin      Functional Mobility:  Bed Mobility:     Supine to Sit: minimum assistance to bring RLE to EOB, pt able to scoot and transition trunk without assist  Sit to Supine: minimum assistance to bring RLE into bedside  Transfers:     Sit to Stand:  minimum assistance with rolling walker x 2 reps, cuing for AD management during transition.   Pre-gait: side steps towards HOB x 3' with RW Rob, Able to weight shift and facilitate step B. No buckling present. Decreased foot clearance when advancing RLE, pt pivoting foot to advance.       AM-PAC 6 CLICK MOBILITY  Total Score:15       Treatment & Education:    Patient provided with verbal education education regarding PT role/goals/POC, fall prevention, and safety awareness.  Understanding was verbalized.     Patient left HOB elevated with all lines intact, call button in reach, and CNA/RN notified.    GOALS:    Multidisciplinary Problems       Physical Therapy Goals          Problem: Physical Therapy    Goal Priority Disciplines Outcome Goal Variances Interventions   Physical Therapy Goal     PT, PT/OT Progressing     Description: Goals to be met by: d/c     Patient will increase functional independence with mobility by performin. Supine to sit with Stand-by Assistance  2. Sit to supine with Stand-by Assistance  3. Sit to stand transfer with Stand-by Assistance  4. Bed to chair transfer with Stand-by Assistance using Rolling Walker  5. Gait  x 100 feet with Stand-by Assistance using Rolling Walker.   6. Ascend/descend 4 stair without Handrails Minimal Assistance using Least Restrictive Assistive Device.                          History:     Past Medical History:   Diagnosis Date    Chronic ITP (idiopathic thrombocytopenia)     Crohn's disease     GERD (gastroesophageal reflux disease)     Hypertension     Leukopenia     Psychosomatic disorder     Severe anxiety        Past Surgical History:   Procedure Laterality Date    BONE MARROW BIOPSY  2016    DILATION AND CURETTAGE OF UTERUS  10/31/2018    PARTIAL HYSTERECTOMY         Time Tracking:     PT Received On: 24  PT Start Time: 0947     PT Stop Time: 1019  PT Total Time (min): 32 min     Billable Minutes: Evaluation 20 and Therapeutic Activity 2024

## 2024-09-07 NOTE — PLAN OF CARE
Problem: Adult Inpatient Plan of Care  Goal: Plan of Care Review  Outcome: Progressing  Goal: Absence of Hospital-Acquired Illness or Injury  Outcome: Progressing  Goal: Optimal Comfort and Wellbeing  Outcome: Progressing  Goal: Readiness for Transition of Care  Outcome: Progressing     Problem: Skin Injury Risk Increased  Goal: Skin Health and Integrity  Outcome: Progressing     Problem: Wound  Goal: Optimal Coping  Outcome: Progressing  Goal: Optimal Pain Control and Function  Outcome: Progressing     Problem: Pain Acute  Goal: Optimal Pain Control and Function  Outcome: Progressing     Problem: Fall Injury Risk  Goal: Absence of Fall and Fall-Related Injury  Outcome: Progressing

## 2024-09-07 NOTE — NURSING
"The patient has refused to be turned throughout shift. She states that she is in too much pain. The patient has also refused her heel boots. Educated patient on pressure ulcer prevention and the importance of turning in bed and wearing heel boots. The patients states "I don't care. I don't want to turn and I don't want your heel boots".   "

## 2024-09-07 NOTE — PLAN OF CARE
Problem: Physical Therapy  Goal: Physical Therapy Goal  Description: Goals to be met by: d/c     Patient will increase functional independence with mobility by performin. Supine to sit with Stand-by Assistance  2. Sit to supine with Stand-by Assistance  3. Sit to stand transfer with Stand-by Assistance  4. Bed to chair transfer with Stand-by Assistance using Rolling Walker  5. Gait  x 100 feet with Stand-by Assistance using Rolling Walker.   6. Ascend/descend 4 stair without Handrails Minimal Assistance using Least Restrictive Assistive Device.     Outcome: Progressing

## 2024-09-08 LAB
ALBUMIN SERPL-MCNC: 2.9 G/DL (ref 3.5–5)
ALBUMIN/GLOB SERPL: 1 RATIO (ref 1.1–2)
ALP SERPL-CCNC: 41 UNIT/L (ref 40–150)
ALT SERPL-CCNC: 10 UNIT/L (ref 0–55)
ANION GAP SERPL CALC-SCNC: 5 MEQ/L
AST SERPL-CCNC: 15 UNIT/L (ref 5–34)
BASOPHILS # BLD AUTO: 0.04 X10(3)/MCL
BASOPHILS NFR BLD AUTO: 1.3 %
BILIRUB SERPL-MCNC: 1.6 MG/DL
BUN SERPL-MCNC: 16.5 MG/DL (ref 9.8–20.1)
CALCIUM SERPL-MCNC: 9.4 MG/DL (ref 8.4–10.2)
CHLORIDE SERPL-SCNC: 106 MMOL/L (ref 98–107)
CO2 SERPL-SCNC: 24 MMOL/L (ref 22–29)
CREAT SERPL-MCNC: 0.83 MG/DL (ref 0.55–1.02)
CREAT/UREA NIT SERPL: 20
EOSINOPHIL # BLD AUTO: 0.08 X10(3)/MCL (ref 0–0.9)
EOSINOPHIL NFR BLD AUTO: 2.6 %
ERYTHROCYTE [DISTWIDTH] IN BLOOD BY AUTOMATED COUNT: 13.4 % (ref 11.5–17)
GFR SERPLBLD CREATININE-BSD FMLA CKD-EPI: >60 ML/MIN/1.73/M2
GLOBULIN SER-MCNC: 2.9 GM/DL (ref 2.4–3.5)
GLUCOSE SERPL-MCNC: 96 MG/DL (ref 74–100)
HCT VFR BLD AUTO: 30.8 % (ref 37–47)
HGB BLD-MCNC: 9.7 G/DL (ref 12–16)
IMM GRANULOCYTES # BLD AUTO: 0.01 X10(3)/MCL (ref 0–0.04)
IMM GRANULOCYTES NFR BLD AUTO: 0.3 %
LYMPHOCYTES # BLD AUTO: 0.94 X10(3)/MCL (ref 0.6–4.6)
LYMPHOCYTES NFR BLD AUTO: 31.1 %
MCH RBC QN AUTO: 29.8 PG (ref 27–31)
MCHC RBC AUTO-ENTMCNC: 31.5 G/DL (ref 33–36)
MCV RBC AUTO: 94.5 FL (ref 80–94)
MONOCYTES # BLD AUTO: 0.29 X10(3)/MCL (ref 0.1–1.3)
MONOCYTES NFR BLD AUTO: 9.6 %
NEUTROPHILS # BLD AUTO: 1.66 X10(3)/MCL (ref 2.1–9.2)
NEUTROPHILS NFR BLD AUTO: 55.1 %
NRBC BLD AUTO-RTO: 0 %
PLATELET # BLD AUTO: 152 X10(3)/MCL (ref 130–400)
PMV BLD AUTO: 10.5 FL (ref 7.4–10.4)
POTASSIUM SERPL-SCNC: 4 MMOL/L (ref 3.5–5.1)
PROT SERPL-MCNC: 5.8 GM/DL (ref 6.4–8.3)
RBC # BLD AUTO: 3.26 X10(6)/MCL (ref 4.2–5.4)
SODIUM SERPL-SCNC: 135 MMOL/L (ref 136–145)
WBC # BLD AUTO: 3.02 X10(3)/MCL (ref 4.5–11.5)

## 2024-09-08 PROCEDURE — 85025 COMPLETE CBC W/AUTO DIFF WBC: CPT | Performed by: STUDENT IN AN ORGANIZED HEALTH CARE EDUCATION/TRAINING PROGRAM

## 2024-09-08 PROCEDURE — 25000003 PHARM REV CODE 250: Performed by: STUDENT IN AN ORGANIZED HEALTH CARE EDUCATION/TRAINING PROGRAM

## 2024-09-08 PROCEDURE — 36415 COLL VENOUS BLD VENIPUNCTURE: CPT | Performed by: STUDENT IN AN ORGANIZED HEALTH CARE EDUCATION/TRAINING PROGRAM

## 2024-09-08 PROCEDURE — 11000001 HC ACUTE MED/SURG PRIVATE ROOM

## 2024-09-08 PROCEDURE — 80053 COMPREHEN METABOLIC PANEL: CPT | Performed by: STUDENT IN AN ORGANIZED HEALTH CARE EDUCATION/TRAINING PROGRAM

## 2024-09-08 PROCEDURE — 99232 SBSQ HOSP IP/OBS MODERATE 35: CPT | Mod: ,,, | Performed by: INTERNAL MEDICINE

## 2024-09-08 RX ADMIN — CLONAZEPAM 0.5 MG: 0.5 TABLET ORAL at 08:09

## 2024-09-08 NOTE — PLAN OF CARE
Problem: Adult Inpatient Plan of Care  Goal: Plan of Care Review  Outcome: Progressing  Goal: Patient-Specific Goal (Individualized)  Outcome: Progressing  Goal: Absence of Hospital-Acquired Illness or Injury  Outcome: Progressing  Goal: Optimal Comfort and Wellbeing  Outcome: Progressing  Goal: Readiness for Transition of Care  Outcome: Progressing     Problem: Skin Injury Risk Increased  Goal: Skin Health and Integrity  Outcome: Progressing     Problem: Wound  Goal: Optimal Coping  Outcome: Progressing  Goal: Optimal Functional Ability  Outcome: Progressing  Goal: Absence of Infection Signs and Symptoms  Outcome: Progressing  Goal: Improved Oral Intake  Outcome: Progressing  Goal: Optimal Pain Control and Function  Outcome: Progressing  Goal: Skin Health and Integrity  Outcome: Progressing  Goal: Optimal Wound Healing  Outcome: Progressing     Problem: Pain Acute  Goal: Optimal Pain Control and Function  Outcome: Progressing     Problem: Fall Injury Risk  Goal: Absence of Fall and Fall-Related Injury  Outcome: Progressing

## 2024-09-08 NOTE — PROGRESS NOTES
[facility] - 5th Floor [facility] Medicine  Progress Note    Patient Name: Amber Martínez  MRN: [id number]  Patient Class: IP- Inpatient   Admission Date: 9/5/2024  Length of Stay: 2 days  Attending Physician: Elle Marc, *  Primary Care Provider: Elle Marc MD        Subjective:     Principal Problem:Bilateral lower extremity pain        HPI:  Ms Clark is a 54 y/o female patient with hypertension, GERD,  ITP, anxiety and chronic lower extremity pain and swelling that presented to ED with complaints of worsening lower extremity pain and weakness. Patient states she has not been able to ambulate due to pain of lower extremities. Patient recently established care with me, states symptoms started approximately 2 months ago with progressively worsened swelling and of lower extremities, has been seeing a Ortho physician and has hadextensive workup but does not have results for this, records were requested this week when patient was seen in office but have not been received yet. Patient states since office visit on Tuesday lower extremity pain and swelling continued to worsen where she could not stand due to pain, states right lower extremity swelling was worse than left. This morning swelling has improved, patient presents with petechial rash and erythema of bilateral lower extremities. In ED venous US of bilateral lower extremities was obtained which was negative for DVT or venous insufficiency. Admission labs significant for hemoglobin of 7.6 and hematocrit of 24.0. Platelets of 159. Patient has been admitted for anemia work up. This morning patient continues to complain of lower extremity pain, worse on right groin, states swelling has improved but still present and petechial rash and erythema still present.     Overview/Hospital Course:  No notes on file    Interval History:  No new complaints.  Still complaining of lower extremity weakness and pain.  Vasculitis  workup underway.  Otherwise stable    Review of Systems   Constitutional: Negative.  Negative for chills and fever.   HENT: Negative.     Eyes: Negative.    Respiratory: Negative.  Negative for cough and shortness of breath.    Cardiovascular: Negative.  Negative for chest pain, palpitations and leg swelling.   Gastrointestinal: Negative.  Negative for abdominal distention, abdominal pain, constipation, diarrhea, nausea and vomiting.   Endocrine: Negative.    Genitourinary: Negative.  Negative for dysuria and hematuria.   Musculoskeletal:  Positive for arthralgias and myalgias. Negative for back pain and joint swelling.   Skin: Negative.  Negative for rash.   Allergic/Immunologic: Negative.    Neurological: Negative.  Negative for dizziness, seizures and headaches.   Hematological: Negative.    Psychiatric/Behavioral: Negative.       Objective:     Vital Signs (Most Recent):  Temp: 97.5 °F (36.4 °C) (09/08/24 1100)  Pulse: 67 (09/08/24 1100)  Resp: 18 (09/08/24 1100)  BP: (!) 146/68 (09/08/24 1100)  SpO2: 100 % (09/08/24 1100) Vital Signs (24h Range):  Temp:  [97.5 °F (36.4 °C)-98.5 °F (36.9 °C)] 97.5 °F (36.4 °C)  Pulse:  [67-81] 67  Resp:  [18] 18  SpO2:  [99 %-100 %] 100 %  BP: (118-152)/(65-74) 146/68     Weight: 56.7 kg (125 lb)  Body mass index is 20.8 kg/m².    Intake/Output Summary (Last 24 hours) at 9/8/2024 1433  Last data filed at 9/8/2024 1300  Gross per 24 hour   Intake 480 ml   Output 1700 ml   Net -1220 ml         Physical Exam  Eyes:      Pupils: Pupils are equal, round, and reactive to light.   Cardiovascular:      Rate and Rhythm: Normal rate.      Pulses: Normal pulses.      Heart sounds: No murmur heard.  Pulmonary:      Effort: Pulmonary effort is normal.      Breath sounds: Normal breath sounds.   Abdominal:      General: Abdomen is flat. Bowel sounds are normal. There is no distension.      Palpations: Abdomen is soft.      Tenderness: There is no guarding.   Musculoskeletal:         General:  Normal range of motion.      Cervical back: Normal range of motion and neck supple.   Skin:     General: Skin is warm.   Neurological:      General: No focal deficit present.      Mental Status: She is alert.   Psychiatric:         Mood and Affect: Mood normal.         Behavior: Behavior normal.             Significant Labs: All pertinent labs within the past 24 hours have been reviewed.  Recent Lab Results         09/08/24  0423        Albumin/Globulin Ratio 1.0       Albumin 2.9       ALP 41       ALT 10       Anion Gap 5.0       AST 15       Baso # 0.04       Basophil % 1.3       BILIRUBIN TOTAL 1.6       BUN 16.5       BUN/CREAT RATIO 20       Calcium 9.4       Chloride 106       CO2 24       Creatinine 0.83       eGFR >60       Eos # 0.08       Eos % 2.6       Globulin, Total 2.9       Glucose 96       Hematocrit 30.8       Hemoglobin 9.7       Immature Grans (Abs) 0.01       Immature Granulocytes 0.3       Lymph # 0.94       LYMPH % 31.1       MCH 29.8       MCHC 31.5       MCV 94.5       Mono # 0.29       Mono % 9.6       MPV 10.5       Neut # 1.66       Neut % 55.1       nRBC 0.0       Platelet Count 152       Potassium 4.0       PROTEIN TOTAL 5.8       RBC 3.26       RDW 13.4       Sodium 135       WBC 3.02               Significant Imaging: I have reviewed all pertinent imaging results/findings within the past 24 hours.    Assessment/Plan:      * Bilateral lower extremity pain  This is chronic and has progressively worsened over the past 3 months- unable to ambulate due to pain  Unclear etiology- suspecting autoimmune, vasculitis process. Venous doppler US of bilateral lower extremities negative for DVT and venous insufficiency   CRP and ESR elevated   CHANTELL, DS DNA, ANCA pending   Patient has extensive medication allergy- would not take anything in addition to her home medications  Will need evaluation by PT  Labs are still pending, sed rate is okay, CRP is elevated at 53.  ANCA and CHANTELL still  pending      Anemia  Significant drop in Hb as compared to 1 month ago  Hb of 7.6 on admission, dropped to 6.5 this morning  Attempt to type and screen in ED but patient declined  Type and screen done this morning, 2 units PRBCs ordered, patient informed of plan and reason for transfusion and agrees to this  Will check CBC post transfusion   Occult blood in stool ordered, pending collection   Denies hematochezia or melena   FOBT negative, status post transfusion H&H responded appropriately.  H&H remained stable continue to monitor repeat blood work in the morning    Leukopenia  This is chronic  Follows with Hematology- seems have fluctuated between 2-3 in the last 3 months   Stable       Hypertension  Continue amlodipine 5 mg daily     Chronic idiopathic thrombocytopenic purpura  Platelets of 159, will monitor  No signs of bleeding  Follows with Hematology as outpatient     Arthritis  Vasculitis workup underway.  CHANTELL Anca etc. her still pending   Sed rate and CRP are equivocally elevated.        VTE Risk Mitigation (From admission, onward)           Ordered     IP VTE HIGH RISK PATIENT  Once         09/05/24 2115                    Discharge Planning   ENDY:      Code Status: Full Code   Is the patient medically ready for discharge?:     Reason for patient still in hospital (select all that apply): Treatment                     ROMEO BAUTISTA MD  Department of Hospital Medicine   [facility] - 5th Floor Med Surg

## 2024-09-08 NOTE — ASSESSMENT & PLAN NOTE
Vasculitis workup underway.  CHANTELL Anca etc. her still pending   Sed rate and CRP are equivocally elevated.

## 2024-09-08 NOTE — SUBJECTIVE & OBJECTIVE
Interval History:  No new complaints.  Still complaining of lower extremity weakness and pain.  Vasculitis workup underway.  Otherwise stable    Review of Systems   Constitutional: Negative.  Negative for chills and fever.   HENT: Negative.     Eyes: Negative.    Respiratory: Negative.  Negative for cough and shortness of breath.    Cardiovascular: Negative.  Negative for chest pain, palpitations and leg swelling.   Gastrointestinal: Negative.  Negative for abdominal distention, abdominal pain, constipation, diarrhea, nausea and vomiting.   Endocrine: Negative.    Genitourinary: Negative.  Negative for dysuria and hematuria.   Musculoskeletal:  Positive for arthralgias and myalgias. Negative for back pain and joint swelling.   Skin: Negative.  Negative for rash.   Allergic/Immunologic: Negative.    Neurological: Negative.  Negative for dizziness, seizures and headaches.   Hematological: Negative.    Psychiatric/Behavioral: Negative.       Objective:     Vital Signs (Most Recent):  Temp: 97.5 °F (36.4 °C) (09/08/24 1100)  Pulse: 67 (09/08/24 1100)  Resp: 18 (09/08/24 1100)  BP: (!) 146/68 (09/08/24 1100)  SpO2: 100 % (09/08/24 1100) Vital Signs (24h Range):  Temp:  [97.5 °F (36.4 °C)-98.5 °F (36.9 °C)] 97.5 °F (36.4 °C)  Pulse:  [67-81] 67  Resp:  [18] 18  SpO2:  [99 %-100 %] 100 %  BP: (118-152)/(65-74) 146/68     Weight: 56.7 kg (125 lb)  Body mass index is 20.8 kg/m².    Intake/Output Summary (Last 24 hours) at 9/8/2024 1433  Last data filed at 9/8/2024 1300  Gross per 24 hour   Intake 480 ml   Output 1700 ml   Net -1220 ml         Physical Exam  Eyes:      Pupils: Pupils are equal, round, and reactive to light.   Cardiovascular:      Rate and Rhythm: Normal rate.      Pulses: Normal pulses.      Heart sounds: No murmur heard.  Pulmonary:      Effort: Pulmonary effort is normal.      Breath sounds: Normal breath sounds.   Abdominal:      General: Abdomen is flat. Bowel sounds are normal. There is no distension.       Palpations: Abdomen is soft.      Tenderness: There is no guarding.   Musculoskeletal:         General: Normal range of motion.      Cervical back: Normal range of motion and neck supple.   Skin:     General: Skin is warm.   Neurological:      General: No focal deficit present.      Mental Status: She is alert.   Psychiatric:         Mood and Affect: Mood normal.         Behavior: Behavior normal.             Significant Labs: All pertinent labs within the past 24 hours have been reviewed.  Recent Lab Results         09/08/24  0423        Albumin/Globulin Ratio 1.0       Albumin 2.9       ALP 41       ALT 10       Anion Gap 5.0       AST 15       Baso # 0.04       Basophil % 1.3       BILIRUBIN TOTAL 1.6       BUN 16.5       BUN/CREAT RATIO 20       Calcium 9.4       Chloride 106       CO2 24       Creatinine 0.83       eGFR >60       Eos # 0.08       Eos % 2.6       Globulin, Total 2.9       Glucose 96       Hematocrit 30.8       Hemoglobin 9.7       Immature Grans (Abs) 0.01       Immature Granulocytes 0.3       Lymph # 0.94       LYMPH % 31.1       MCH 29.8       MCHC 31.5       MCV 94.5       Mono # 0.29       Mono % 9.6       MPV 10.5       Neut # 1.66       Neut % 55.1       nRBC 0.0       Platelet Count 152       Potassium 4.0       PROTEIN TOTAL 5.8       RBC 3.26       RDW 13.4       Sodium 135       WBC 3.02               Significant Imaging: I have reviewed all pertinent imaging results/findings within the past 24 hours.

## 2024-09-08 NOTE — ASSESSMENT & PLAN NOTE
Significant drop in Hb as compared to 1 month ago  Hb of 7.6 on admission, dropped to 6.5 this morning  Attempt to type and screen in ED but patient declined  Type and screen done this morning, 2 units PRBCs ordered, patient informed of plan and reason for transfusion and agrees to this  Will check CBC post transfusion   Occult blood in stool ordered, pending collection   Denies hematochezia or melena   FOBT negative, status post transfusion H&H responded appropriately.  H&H remained stable continue to monitor repeat blood work in the morning

## 2024-09-08 NOTE — NURSING
Nurses Note -- 4 Eyes      9/8/2024   1:24 AM      Skin assessed during: Q Shift Change      [] No Altered Skin Integrity Present    []Prevention Measures Documented      [x] Yes- Altered Skin Integrity Present or Discovered   [] LDA Added if Not in Epic (Describe Wound)   [] New Altered Skin Integrity was Present on Admit and Documented in LDA   [] Wound Image Taken    Wound Care Consulted? No    Attending Nurse:  Chanad Almanzar RN/Staff Member:   Georgie

## 2024-09-09 PROBLEM — F99 INAPPROPRIATE BEHAVIOR: Status: ACTIVE | Noted: 2024-09-09

## 2024-09-09 LAB
ALBUMIN SERPL-MCNC: 3 G/DL (ref 3.5–5)
ALBUMIN/GLOB SERPL: 1 RATIO (ref 1.1–2)
ALP SERPL-CCNC: 42 UNIT/L (ref 40–150)
ALT SERPL-CCNC: 10 UNIT/L (ref 0–55)
ANION GAP SERPL CALC-SCNC: 5 MEQ/L
AST SERPL-CCNC: 14 UNIT/L (ref 5–34)
BASOPHILS # BLD AUTO: 0.03 X10(3)/MCL
BASOPHILS NFR BLD AUTO: 1.1 %
BILIRUB SERPL-MCNC: 1.3 MG/DL
BUN SERPL-MCNC: 16.6 MG/DL (ref 9.8–20.1)
C-ANCA TITR SER IF: NEGATIVE {TITER}
CALCIUM SERPL-MCNC: 9.4 MG/DL (ref 8.4–10.2)
CHLORIDE SERPL-SCNC: 103 MMOL/L (ref 98–107)
CO2 SERPL-SCNC: 28 MMOL/L (ref 22–29)
CREAT SERPL-MCNC: 0.87 MG/DL (ref 0.55–1.02)
CREAT/UREA NIT SERPL: 19
DSDNA AB QUANT (OHS): 1.1 IU/ML
EOSINOPHIL # BLD AUTO: 0.08 X10(3)/MCL (ref 0–0.9)
EOSINOPHIL NFR BLD AUTO: 3 %
ERYTHROCYTE [DISTWIDTH] IN BLOOD BY AUTOMATED COUNT: 13.3 % (ref 11.5–17)
GBM QUANT (OHS): <1.5 U/ML
GFR SERPLBLD CREATININE-BSD FMLA CKD-EPI: >60 ML/MIN/1.73/M2
GLOBULIN SER-MCNC: 2.9 GM/DL (ref 2.4–3.5)
GLUCOSE SERPL-MCNC: 89 MG/DL (ref 74–100)
HCT VFR BLD AUTO: 30.4 % (ref 37–47)
HGB BLD-MCNC: 9.6 G/DL (ref 12–16)
IMM GRANULOCYTES # BLD AUTO: 0.01 X10(3)/MCL (ref 0–0.04)
IMM GRANULOCYTES NFR BLD AUTO: 0.4 %
JO-1 AB QUANT (OHS): <0.3 U/ML
LYMPHOCYTES # BLD AUTO: 0.87 X10(3)/MCL (ref 0.6–4.6)
LYMPHOCYTES NFR BLD AUTO: 33.1 %
MCH RBC QN AUTO: 29.6 PG (ref 27–31)
MCHC RBC AUTO-ENTMCNC: 31.6 G/DL (ref 33–36)
MCV RBC AUTO: 93.8 FL (ref 80–94)
MONOCYTES # BLD AUTO: 0.3 X10(3)/MCL (ref 0.1–1.3)
MONOCYTES NFR BLD AUTO: 11.4 %
MPO QUANT (OLG): 0.3 U/ML
NEUTROPHILS # BLD AUTO: 1.34 X10(3)/MCL (ref 2.1–9.2)
NEUTROPHILS NFR BLD AUTO: 51 %
NRBC BLD AUTO-RTO: 0 %
P-ANCA SER QL IF: NEGATIVE
PLATELET # BLD AUTO: 175 X10(3)/MCL (ref 130–400)
PMV BLD AUTO: 10.1 FL (ref 7.4–10.4)
POTASSIUM SERPL-SCNC: 4.3 MMOL/L (ref 3.5–5.1)
PR3 QUANT (OHS): <0.6 U/ML
PROT SERPL-MCNC: 5.9 GM/DL (ref 6.4–8.3)
RBC # BLD AUTO: 3.24 X10(6)/MCL (ref 4.2–5.4)
SODIUM SERPL-SCNC: 136 MMOL/L (ref 136–145)
WBC # BLD AUTO: 2.63 X10(3)/MCL (ref 4.5–11.5)

## 2024-09-09 PROCEDURE — 99223 1ST HOSP IP/OBS HIGH 75: CPT | Mod: ,,, | Performed by: ORTHOPAEDIC SURGERY

## 2024-09-09 PROCEDURE — 25000003 PHARM REV CODE 250: Performed by: STUDENT IN AN ORGANIZED HEALTH CARE EDUCATION/TRAINING PROGRAM

## 2024-09-09 PROCEDURE — 11000001 HC ACUTE MED/SURG PRIVATE ROOM

## 2024-09-09 PROCEDURE — 99233 SBSQ HOSP IP/OBS HIGH 50: CPT | Mod: ,,, | Performed by: STUDENT IN AN ORGANIZED HEALTH CARE EDUCATION/TRAINING PROGRAM

## 2024-09-09 PROCEDURE — 85025 COMPLETE CBC W/AUTO DIFF WBC: CPT | Performed by: STUDENT IN AN ORGANIZED HEALTH CARE EDUCATION/TRAINING PROGRAM

## 2024-09-09 PROCEDURE — 80053 COMPREHEN METABOLIC PANEL: CPT | Performed by: STUDENT IN AN ORGANIZED HEALTH CARE EDUCATION/TRAINING PROGRAM

## 2024-09-09 PROCEDURE — 99223 1ST HOSP IP/OBS HIGH 75: CPT | Mod: ,,, | Performed by: NURSE PRACTITIONER

## 2024-09-09 PROCEDURE — 36415 COLL VENOUS BLD VENIPUNCTURE: CPT | Performed by: STUDENT IN AN ORGANIZED HEALTH CARE EDUCATION/TRAINING PROGRAM

## 2024-09-09 RX ADMIN — CLONAZEPAM 0.5 MG: 0.5 TABLET ORAL at 08:09

## 2024-09-09 NOTE — PROGRESS NOTES
[facility] - 5th Floor [facility] Medicine  Progress Note    Patient Name: Amber Martínez  MRN: [id number]  Patient Class: IP- Inpatient   Admission Date: 9/5/2024  Length of Stay: 3 days  Attending Physician: Elle Marc, *  Primary Care Provider: Elle Marc MD        Subjective:     Principal Problem:Bilateral lower extremity pain        HPI:  Ms Clark is a 54 y/o female patient with hypertension, GERD,  ITP, anxiety and chronic lower extremity pain and swelling that presented to ED with complaints of worsening lower extremity pain and weakness. Patient states she has not been able to ambulate due to pain of lower extremities. Patient recently established care with me, states symptoms started approximately 2 months ago with progressively worsened swelling and of lower extremities, has been seeing a Ortho physician and has hadextensive workup but does not have results for this, records were requested this week when patient was seen in office but have not been received yet. Patient states since office visit on Tuesday lower extremity pain and swelling continued to worsen where she could not stand due to pain, states right lower extremity swelling was worse than left. This morning swelling has improved, patient presents with petechial rash and erythema of bilateral lower extremities. In ED venous US of bilateral lower extremities was obtained which was negative for DVT or venous insufficiency. Admission labs significant for hemoglobin of 7.6 and hematocrit of 24.0. Platelets of 159. Patient has been admitted for anemia work up. This morning patient continues to complain of lower extremity pain, worse on right groin, states swelling has improved but still present and petechial rash and erythema still present.     Overview/Hospital Course:  No notes on file    Interval History: Patient continues to complain of bilateral lower extremity pain, today she states it is worse  around left knee with swelling. Declined PT due to pain but states only medication she can take is Tylenol due to allergies. Patient is very anxious, keeps repeating she believes she has cellulitis and that she has talked with previous physicians that have treated her, explained I have requested records from them but still have not received them. Explained autoimmune work up is pending. Discussed possible knee arthrocentesis due to worsening swelling of left knee    Review of Systems   Constitutional:  Negative for chills, fatigue and fever.   HENT:  Negative for congestion, rhinorrhea and sore throat.    Respiratory:  Negative for cough, chest tightness and shortness of breath.    Cardiovascular:  Negative for chest pain, palpitations and leg swelling.   Gastrointestinal:  Negative for abdominal distention, abdominal pain, constipation, diarrhea, nausea and vomiting.   Genitourinary:  Negative for dysuria.   Musculoskeletal:  Positive for arthralgias and joint swelling. Negative for back pain.   Neurological:  Negative for dizziness and headaches.   Psychiatric/Behavioral:  Negative for agitation and confusion.      Objective:     Vital Signs (Most Recent):  Temp: 98.2 °F (36.8 °C) (09/09/24 1514)  Pulse: 68 (09/09/24 1514)  Resp: 18 (09/08/24 1931)  BP: (!) 126/56 (09/09/24 1514)  SpO2: 100 % (09/09/24 1514) Vital Signs (24h Range):  Temp:  [97.4 °F (36.3 °C)-98.6 °F (37 °C)] 98.2 °F (36.8 °C)  Pulse:  [62-74] 68  Resp:  [18] 18  SpO2:  [99 %-100 %] 100 %  BP: (104-126)/(47-69) 126/56     Weight: 56.7 kg (125 lb)  Body mass index is 20.8 kg/m².    Intake/Output Summary (Last 24 hours) at 9/9/2024 1742  Last data filed at 9/9/2024 1300  Gross per 24 hour   Intake 360 ml   Output 1000 ml   Net -640 ml         Physical Exam  Constitutional:       General: She is not in acute distress.     Appearance: Normal appearance.   HENT:      Head: Normocephalic and atraumatic.   Eyes:      Extraocular Movements: Extraocular  movements intact.      Pupils: Pupils are equal, round, and reactive to light.   Cardiovascular:      Rate and Rhythm: Normal rate and regular rhythm.      Pulses: Normal pulses.      Heart sounds: Normal heart sounds.   Pulmonary:      Effort: Pulmonary effort is normal.      Breath sounds: Normal breath sounds.   Abdominal:      General: Abdomen is flat. Bowel sounds are normal. There is no distension.      Palpations: Abdomen is soft.      Tenderness: There is no abdominal tenderness.   Musculoskeletal:         General: Swelling and tenderness present.      Cervical back: Normal range of motion and neck supple.      Right lower leg: Edema present.      Left lower leg: Edema present.      Comments: Petechial rash and erythema of bilateral lower extremities still present    Neurological:      General: No focal deficit present.      Mental Status: She is alert and oriented to person, place, and time.   Psychiatric:         Mood and Affect: Mood is anxious.         Speech: Speech is rapid and pressured.         Behavior: Behavior is not agitated.             Significant Labs: All pertinent labs within the past 24 hours have been reviewed.    Significant Imaging: I have reviewed all pertinent imaging results/findings within the past 24 hours.    Assessment/Plan:      * Bilateral lower extremity pain  This is chronic and has progressively worsened over the past 3 months- unable to ambulate due to pain  Unclear etiology. Venous doppler US of bilateral lower extremities negative for DVT and venous insufficiency   CRP and ESR elevated   CHANTELL, DS DNA, ANCA negative ruling out autoimmune vasculitis process   Patient has extensive medication allergy- would not take anything in addition to her home medications  Declining to participate with PT due to pain but states she is not able to take any medications besides Tylenol for pain        Anemia  Significant drop in Hb as compared to 1 month ago  Hb of 7.6 on admission, dropped  to 6.5 09/06, now 9.6 after 2 units of PRBCs on 09/06 and has remained stable since   Occult blood in stool negative   Denies hematochezia or melena       Leukopenia  This is chronic  Follows with Hematology- seems have fluctuated between 2-3 in the last 3 months   Stable       Hypertension  Continue amlodipine 5 mg daily     Chronic idiopathic thrombocytopenic purpura  Platelets of 175, will monitor  No signs of bleeding  Follows with Hematology as outpatient     Arthritis  CHANTELL, Anca negative ruling out vasculitis   Sed rate and CRP are elevated.  Presents with effusion of left knee, Ortho consulted for possible arthrocentesis in hopes of obtaining fluid analyses but not enough to be aspirated         VTE Risk Mitigation (From admission, onward)           Ordered     IP VTE HIGH RISK PATIENT  Once         09/05/24 2115                    Discharge Planning   ENDY:      Code Status: Full Code   Is the patient medically ready for discharge?:     Reason for patient still in hospital (select all that apply): Treatment  Discharge Plan A: Home                  Elle Mackay MD  Department of Hospital Medicine   [facility] - 5th Floor Med Surg

## 2024-09-09 NOTE — PLAN OF CARE
09/09/24 1452   Discharge Assessment   Assessment Type Discharge Planning Assessment   Confirmed/corrected address, phone number and insurance Yes   Confirmed Demographics Correct on Facesheet   Source of Information patient;family   Communicated ENDY with patient/caregiver Date not available/Unable to determine   Reason For Admission lower extremity edema   People in Home child(maliha), adult   Do you expect to return to your current living situation? Yes   Do you have help at home or someone to help you manage your care at home? No   Prior to hospitilization cognitive status: Alert/Oriented   Current cognitive status: Alert/Oriented   Walking or Climbing Stairs Difficulty yes   Walking or Climbing Stairs ambulation difficulty, assistance 1 person;stair climbing difficulty, assistance 1 person;transferring difficulty, assistance 1 person   Mobility Management recent change in status   Dressing/Bathing Difficulty yes   Dressing/Bathing bathing difficulty, assistance 1 person;dressing difficulty, assistance 1 person   Home Accessibility stairs to enter home   Equipment Currently Used at Home none   Readmission within 30 days? No   Patient currently being followed by outpatient case management? No   Do you currently have service(s) that help you manage your care at home? No   Do you take prescription medications? Yes   Do you have prescription coverage? Yes   Coverage Medicare   Do you have any problems affording any of your prescribed medications? No   Is the patient taking medications as prescribed? yes   Who is going to help you get home at discharge? children   How do you get to doctors appointments? family or friend will provide   Are you on dialysis? No   Do you take coumadin? No   Discharge Plan A Home   Discharge Plan B Home Health   DME Needed Upon Discharge  none   Discharge Plan discussed with: Patient;Adult children   Transition of Care Barriers None

## 2024-09-09 NOTE — PLAN OF CARE
Discharge planning with patient with son present in room. Patient exhibiting manic behaviors. Disheveled appearance pressured rambling speech.  Fixated on MD her and trying to discharge her so she can go on vacation. States that consulting Doctors do not have any clue what the MD is trying to do or why they were consulted. Tearful and loud stated that [place] and that she is being treated like trash while her brother who has no insurance was here for 4 months fixing his issues and was treated well. Explained to her that insurance or lack of will not alter the care she is given. She is adamant that MD needs to keep her here until all test are resulted and until she can walk. She is fixated on her uninsured brother staying here for 4 months. She states that she cannot walk and cannot go home like that. Asked her if she wants placement she states not for months Patient is adamant that she is can sit in hospital and get leg massages and whirlpool and regain function

## 2024-09-09 NOTE — PROGRESS NOTES
Patient presented with inappropriate behavior when CM tried to discuss discharge planning with her. Presented with pressured speech, agitated, having fixated ideas. Will consult Psychiatry for evaluation.

## 2024-09-09 NOTE — PT/OT/SLP PROGRESS
Physical Therapy Treatment    Patient Name:  Amber Martínez   MRN:  [id number]    Pt refused and there was no persuading her.       09/09/2024

## 2024-09-09 NOTE — ASSESSMENT & PLAN NOTE
This is chronic and has progressively worsened over the past 3 months- unable to ambulate due to pain  Unclear etiology. Venous doppler US of bilateral lower extremities negative for DVT and venous insufficiency   CRP and ESR elevated   CHANTELL, DS DNA, ANCA negative ruling out autoimmune vasculitis process   Patient has extensive medication allergy- would not take anything in addition to her home medications  Declining to participate with PT due to pain but states she is not able to take any medications besides Tylenol for pain

## 2024-09-09 NOTE — CONSULTS
[facility] - 5th Floor Med Surg  Orthopedics  Consult Note    Patient Name: Amber Martínez  MRN: [id number]  Admission Date: 9/5/2024  Hospital Length of Stay: 3 days  Attending Provider: Elle Marc, *  Primary Care Provider: Elle Marc MD    Subjective:     Principal Problem:Bilateral lower extremity pain    Chief Complaint:   Chief Complaint   Patient presents with    Leg Swelling     Presents POV with c/o swelling to the bilateral LE and feet x2 months. Sent by Codie for admission.         HPI:52YO F here for bilateral lower extremity swelling and inability to mobilize due to leg pain. She has had progressive swelling and pain to her legs over the past weeks and was seen in her providers office Tuesday of last week and sent home but her family had to lift her in/out of the car. She has been in significant pain from her groin to her feet but states the edema to her legs has improved significantly, however, the pain has not. Currently states her right leg is more painful than left. Adamant that she is not and has not been refusing therapy. PMH includes hypertension, GERD,  ITP, anxiety. Ortho was consulted for a left knee aspiration.     Past Medical History:   Diagnosis Date    Chronic ITP (idiopathic thrombocytopenia)     Crohn's disease     GERD (gastroesophageal reflux disease)     Hypertension     Leukopenia     Psychosomatic disorder     Severe anxiety        Past Surgical History:   Procedure Laterality Date    BONE MARROW BIOPSY  06/13/2016    DILATION AND CURETTAGE OF UTERUS  10/31/2018    PARTIAL HYSTERECTOMY         Review of patient's allergies indicates:   Allergen Reactions    Ketorolac      Other reaction(s): SOB, Stops breathing    Prednisone Shortness Of Breath     Other reaction(s): SOB    Amoxicillin-pot clavulanate      Other reaction(s): Hives, RASH    Azithromycin      Other reaction(s): RASH    Ciprofloxacin      Other reaction(s): Hives, RASH     Clindamycin      Other reaction(s): Rash    Diphenoxylate-atropine      Other reaction(s): Hives, RASH    Hydrocodone-acetaminophen      Hives, swelling    Iodine      Other reaction(s): Difficulty breathing, Hives, SOB, Swelling    Penicillin      Other reaction(s): Hives, RASH    Shellfish containing products      Other reaction(s): Hives, RASH    Sulfamethoxazole-trimethoprim      Other reaction(s): Hives, RASH    Acetaminophen      Hives    Atropine      Hives    Cefuroxime      Hives    Clonazepam Hives    Diphenoxylate      Hives    Doxycycline     Famotidine      Other Reaction(s): Unknown    Hydrocodone      Hives    Povidone-iodine      Other Reaction(s): Unknown    Sulfamethoxazole      Hives    Trimethoprim      Hives    Zoloft [sertraline] Other (See Comments)     Serotonin syndrome        Current Facility-Administered Medications   Medication    0.9%  NaCl infusion (for blood administration)    amLODIPine tablet 5 mg    clonazePAM tablet 0.5 mg    estradioL InPk 10 mcg    loperamide capsule 2 mg    pantoprazole EC tablet 40 mg    sodium chloride 0.9% flush 10 mL     Family History       Problem Relation (Age of Onset)    Brain cancer Father    Diabetes Mother, Father, Brother    ALYSE disease Father    Heart attack Mother    Heart disease Mother, Sister    Hypertension Mother          Tobacco Use    Smoking status: Never    Smokeless tobacco: Never   Substance and Sexual Activity    Alcohol use: Never    Drug use: Never    Sexual activity: Not on file     ROS:  Constitutional: Denies fever chills  Eyes: No change in vision  ENT: No ringing or current infections  CV: No chest pain  Resp: No labored breathing  MSK: Pain evident at site of injury located in HPI,   Integ: No signs of abrasions or lacerations  Neuro: No numbness or tingling  Lymphatic: swelling to BLE  Objective:     Vital Signs (Most Recent):  Temp: 97.4 °F (36.3 °C) (09/09/24 1113)  Pulse: 62 (09/09/24 1113)  Resp: 18 (09/08/24 1931)  BP:  "(!) 111/47 (09/09/24 1113)  SpO2: 99 % (09/09/24 1113) Vital Signs (24h Range):  Temp:  [97.3 °F (36.3 °C)-98.6 °F (37 °C)] 97.4 °F (36.3 °C)  Pulse:  [57-74] 62  Resp:  [18] 18  SpO2:  [99 %-100 %] 99 %  BP: (104-129)/(47-76) 111/47     Weight: 56.7 kg (125 lb)  Height: 5' 5" (165.1 cm)  Body mass index is 20.8 kg/m².      Intake/Output Summary (Last 24 hours) at 9/9/2024 1431  Last data filed at 9/9/2024 1300  Gross per 24 hour   Intake 360 ml   Output 1000 ml   Net -640 ml     General the patient is alert and oriented x3     Constitutional: Vital signs are examined and stable.  Resp: No signs of labored breathing               LLE: -Skin:  No signs of new abrasions or lacerations, no scars, small knee effusion palpated           -MSK: active DF/PF, knee taken through passive ROM           -Neuro:  Sensation intact to light touch L3-S1 dermatomes           -CV: Capillary refill is less than 2 seconds. 2+ Compartments soft and compressible                      RLE: -Skin: No signs of new abrasions or lacerations, no scars, some ecchymosis at ankle           -MSK: : active DF/PF; does not allow for me to range her knee           -Neuro:  Sensation intact to light touch L3-S1 dermatomes           -CV: Capillary refill is less than 2 seconds. Compartments soft and compressible.      Significant Labs: CBC:   Recent Labs   Lab 09/08/24  0423 09/09/24  0400   WBC 3.02* 2.63*   HGB 9.7* 9.6*   HCT 30.8* 30.4*    175     All pertinent labs within the past 24 hours have been reviewed.  Recent Lab Results  (Last 5 results in the past 72 hours)        09/09/24  0400   09/08/24  0423   09/07/24  0717   09/07/24  0234   09/06/24  1813        Immature Platelet Fraction       4.1   3.7       Albumin/Globulin Ratio 1.0   1.0     1.1         Albumin 3.0   2.9     3.0         ALP 42   41     39         ALT 10   10     10         Anion Gap 5.0   5.0     4.0         AST 14   15     15         Baso # 0.03   0.04     0.04   " 0.04       Basophil % 1.1   1.3     1.4   1.0       BILIRUBIN TOTAL 1.3   1.6     1.7         BUN 16.6   16.5     17.4         BUN/CREAT RATIO 19   20     19         Calcium 9.4   9.4     9.5         Chloride 103   106     105         CO2 28   24     27         Creatinine 0.87   0.83     0.91         eGFR >60   >60     >60         Eos # 0.08   0.08     0.09   0.09       Eos % 3.0   2.6     3.2   2.2       Globulin, Total 2.9   2.9     2.7         Glucose 89   96     94         Hematocrit 30.4   30.8     28.5   24.4       Hemoglobin 9.6   9.7     9.3   7.9       Immature Grans (Abs) 0.01   0.01     0.02   0.02       Immature Granulocytes 0.4   0.3     0.7   0.5       Lymph # 0.87   0.94     1.03   0.89       LYMPH % 33.1   31.1     36.8   21.9       MCH 29.6   29.8     30.4   30.0       MCHC 31.6   31.5     32.6   32.4       MCV 93.8   94.5     93.1   92.8       Mono # 0.30   0.29     0.32   0.35       Mono % 11.4   9.6     11.4   8.6       MPV 10.1   10.5     10.1   10.6       Neut # 1.34   1.66     1.30   2.68       Neut % 51.0   55.1     46.5   65.8       nRBC 0.0   0.0     0.0   0.0       Occult Blood     Negative           Platelet Count 175   152     140   148       Potassium 4.3   4.0     4.5         PROTEIN TOTAL 5.9   5.8     5.7         RBC 3.24   3.26     3.06   2.63       RDW 13.3   13.4     13.5   13.0       Retic       2.66         Retic Count Abs       0.0814         Sodium 136   135     136         Stool Color 1     Brown           Stool Consistancy 1     formed           WBC 2.63   3.02     2.80   4.07                               Significant Imaging: I have reviewed all pertinent imaging results/findings.  CV Ultrasound doppler venous legs bilat    Result Date: 9/7/2024  The right lower extremity venous system is patent with no evidence of superficial or deep vein thrombosis. The left lower extremity venous system is patent with no evidence of superficial or deep vein thrombosis.     X-Ray Hip 2 or  3 views Right with Pelvis when performed    Result Date: 9/6/2024  EXAMINATION: XR HIP WITH PELVIS WHEN PERFORMED 2 OR 3 VIEWS RIGHT CLINICAL HISTORY: right hip pain; TECHNIQUE: AP view of the pelvis with frontal and frog leg lateral views of the right hip COMPARISON: Radiographs 07/22/2024 FINDINGS: No acute fracture identified.  Right hip aligned with underlying mild to moderate osteoarthritis.     No acute osseous process appreciated. Electronically signed by: Jaylen Benitez Date:    09/06/2024 Time:    16:10    X-Ray Chest AP Portable    Result Date: 9/6/2024  EXAMINATION: XR CHEST AP PORTABLE CLINICAL HISTORY: Localized edema COMPARISON: 01/21/2024 FINDINGS: Single view of the chest shows no focal consolidation, pneumothorax or pleural effusion.  Cardiac silhouette and pulmonary vasculature are normal.  Aorta is partially calcified.     No acute findings in the chest Electronically signed by: Johnson Quintanilla MD Date:    09/06/2024 Time:    06:17       Assessment/Plan:     Active Diagnoses:    Diagnosis Date Noted POA    PRINCIPAL PROBLEM:  Bilateral lower extremity pain [M79.604, M79.605] 09/05/2024 Yes    Anemia [D64.9] 09/06/2024 Yes    Leukopenia [D72.819] 09/05/2024 Yes    Chronic idiopathic thrombocytopenic purpura [D69.3] 09/28/2022 Yes    Hypertension [I10] 09/28/2022 Yes    Arthritis [M19.90] 09/28/2022 Yes      Problems Resolved During this Admission:   54YO Female here for bilateral leg swelling and pain  Ortho was consulted for a left knee aspiration.  Her effusion on exam is minimal and do not feel an aspiration would offer much relief as her pain is not localized to her knee  low suspicion for septic process in this knee with minimal pain at the knee itself and allows for ROM  Today her pain is more significant to her RLE vs LLE.   She has not had any trauma to her L knee recently; no xrays ordered  DVT workup is negative  Workup for bilateral leg pain and weakness ongoing  No orthopedic intervention  at this time    The above findings, diagnostics, and treatment plan were discussed with Dr. Diaz who is in agreement with the plan of care except as stated in additional documentation.     RAHEL Layton  Orthopedic Trauma Surgery  [facility] - 5th Floor Med Surg        Yes

## 2024-09-09 NOTE — SUBJECTIVE & OBJECTIVE
Interval History: Patient continues to complain of bilateral lower extremity pain, today she states it is worse around left knee with swelling. Declined PT due to pain but states only medication she can take is Tylenol due to allergies. Patient is very anxious, keeps repeating she believes she has cellulitis and that she has talked with previous physicians that have treated her, explained I have requested records from them but still have not received them. Explained autoimmune work up is pending. Discussed possible knee arthrocentesis due to worsening swelling of left knee    Review of Systems   Constitutional:  Negative for chills, fatigue and fever.   HENT:  Negative for congestion, rhinorrhea and sore throat.    Respiratory:  Negative for cough, chest tightness and shortness of breath.    Cardiovascular:  Negative for chest pain, palpitations and leg swelling.   Gastrointestinal:  Negative for abdominal distention, abdominal pain, constipation, diarrhea, nausea and vomiting.   Genitourinary:  Negative for dysuria.   Musculoskeletal:  Positive for arthralgias and joint swelling. Negative for back pain.   Neurological:  Negative for dizziness and headaches.   Psychiatric/Behavioral:  Negative for agitation and confusion.      Objective:     Vital Signs (Most Recent):  Temp: 98.2 °F (36.8 °C) (09/09/24 1514)  Pulse: 68 (09/09/24 1514)  Resp: 18 (09/08/24 1931)  BP: (!) 126/56 (09/09/24 1514)  SpO2: 100 % (09/09/24 1514) Vital Signs (24h Range):  Temp:  [97.4 °F (36.3 °C)-98.6 °F (37 °C)] 98.2 °F (36.8 °C)  Pulse:  [62-74] 68  Resp:  [18] 18  SpO2:  [99 %-100 %] 100 %  BP: (104-126)/(47-69) 126/56     Weight: 56.7 kg (125 lb)  Body mass index is 20.8 kg/m².    Intake/Output Summary (Last 24 hours) at 9/9/2024 1742  Last data filed at 9/9/2024 1300  Gross per 24 hour   Intake 360 ml   Output 1000 ml   Net -640 ml         Physical Exam  Constitutional:       General: She is not in acute distress.     Appearance: Normal  appearance.   HENT:      Head: Normocephalic and atraumatic.   Eyes:      Extraocular Movements: Extraocular movements intact.      Pupils: Pupils are equal, round, and reactive to light.   Cardiovascular:      Rate and Rhythm: Normal rate and regular rhythm.      Pulses: Normal pulses.      Heart sounds: Normal heart sounds.   Pulmonary:      Effort: Pulmonary effort is normal.      Breath sounds: Normal breath sounds.   Abdominal:      General: Abdomen is flat. Bowel sounds are normal. There is no distension.      Palpations: Abdomen is soft.      Tenderness: There is no abdominal tenderness.   Musculoskeletal:         General: Swelling and tenderness present.      Cervical back: Normal range of motion and neck supple.      Right lower leg: Edema present.      Left lower leg: Edema present.      Comments: Petechial rash and erythema of bilateral lower extremities still present    Neurological:      General: No focal deficit present.      Mental Status: She is alert and oriented to person, place, and time.   Psychiatric:         Mood and Affect: Mood is anxious.         Speech: Speech is rapid and pressured.         Behavior: Behavior is not agitated.             Significant Labs: All pertinent labs within the past 24 hours have been reviewed.    Significant Imaging: I have reviewed all pertinent imaging results/findings within the past 24 hours.

## 2024-09-09 NOTE — ASSESSMENT & PLAN NOTE
CHANTELL, Anca negative ruling out vasculitis   Sed rate and CRP are elevated.  Presents with effusion of left knee, Ortho consulted for possible arthrocentesis in hopes of obtaining fluid analyses but not enough to be aspirated

## 2024-09-09 NOTE — NURSING
Nurses Note -- 4 Eyes      9/8/2024   11:38 PM      Skin assessed during: Q Shift Change      [] No Altered Skin Integrity Present    []Prevention Measures Documented      [x] Yes- Altered Skin Integrity Present or Discovered   [] LDA Added if Not in Epic (Describe Wound)   [] New Altered Skin Integrity was Present on Admit and Documented in LDA   [] Wound Image Taken    Wound Care Consulted? No    Attending Nurse:  Chanda Almanzar RN/Staff Member:  Sugey

## 2024-09-09 NOTE — ASSESSMENT & PLAN NOTE
Significant drop in Hb as compared to 1 month ago  Hb of 7.6 on admission, dropped to 6.5 09/06, now 9.6 after 2 units of PRBCs on 09/06 and has remained stable since   Occult blood in stool negative   Denies hematochezia or melena

## 2024-09-09 NOTE — ASSESSMENT & PLAN NOTE
Patient is exhibiting pressured speech, agitated when  tried to discuss discharge planing with her, exhibiting manic behaviors  Will consult Psychiatry for evaluation

## 2024-09-10 VITALS
OXYGEN SATURATION: 99 % | RESPIRATION RATE: 18 BRPM | HEIGHT: 65 IN | SYSTOLIC BLOOD PRESSURE: 147 MMHG | BODY MASS INDEX: 20.83 KG/M2 | WEIGHT: 125 LBS | DIASTOLIC BLOOD PRESSURE: 79 MMHG | HEART RATE: 87 BPM | TEMPERATURE: 98 F

## 2024-09-10 PROCEDURE — 99232 SBSQ HOSP IP/OBS MODERATE 35: CPT | Mod: ,,, | Performed by: INTERNAL MEDICINE

## 2024-09-10 PROCEDURE — 97116 GAIT TRAINING THERAPY: CPT | Mod: CQ

## 2024-09-10 PROCEDURE — 25000003 PHARM REV CODE 250: Performed by: STUDENT IN AN ORGANIZED HEALTH CARE EDUCATION/TRAINING PROGRAM

## 2024-09-10 RX ORDER — RISPERIDONE 0.25 MG/1
0.5 TABLET ORAL 2 TIMES DAILY
Status: DISCONTINUED | OUTPATIENT
Start: 2024-09-10 | End: 2024-09-10 | Stop reason: HOSPADM

## 2024-09-10 RX ORDER — RISPERIDONE 0.5 MG/1
0.5 TABLET ORAL 2 TIMES DAILY
Qty: 60 TABLET | Refills: 11 | Status: SHIPPED | OUTPATIENT
Start: 2024-09-10 | End: 2025-09-10

## 2024-09-10 RX ADMIN — CLONAZEPAM 0.5 MG: 0.5 TABLET ORAL at 09:09

## 2024-09-10 NOTE — PLAN OF CARE
Spoke to Kait at [facility] she states that the consult was called in yesterday afternoon and the NP has 24 hours to see the patient

## 2024-09-10 NOTE — ASSESSMENT & PLAN NOTE
CHANTELL, Anca negative ruling out vasculitis   Sed rate and CRP are elevated.  Presents with effusion of left knee, not enough fluid for drainage.

## 2024-09-10 NOTE — DISCHARGE SUMMARY
[facility] - 5th Floor [facility] Surg  [facility] Medicine  Discharge Summary      Patient Name: Amber Martínez  MRN: [id number]  Admission Date: 9/5/2024  Hospital Length of Stay: 4 days  Discharge Date and Time:  09/10/2024 4:38 PM  Attending Physician: Elle Marc, *   Discharging Provider: ROMEO BAUTISTA MD  Discharge Provider Team: Networked reference to record PCT   Primary Care Provider: Elle Marc MD        HPI:  Ms Clark is a 52 y/o female patient with hypertension, GERD,  ITP, anxiety and chronic lower extremity pain and swelling that presented to ED with complaints of worsening lower extremity pain and weakness. Patient states she has not been able to ambulate due to pain of lower extremities. Patient recently established care with me, states symptoms started approximately 2 months ago with progressively worsened swelling and of lower extremities, has been seeing a Ortho physician and has hadextensive workup but does not have results for this, records were requested this week when patient was seen in office but have not been received yet. Patient states since office visit on Tuesday lower extremity pain and swelling continued to worsen where she could not stand due to pain, states right lower extremity swelling was worse than left. This morning swelling has improved, patient presents with petechial rash and erythema of bilateral lower extremities. In ED venous US of bilateral lower extremities was obtained which was negative for DVT or venous insufficiency. Admission labs significant for hemoglobin of 7.6 and hematocrit of 24.0. Platelets of 159. Patient has been admitted for anemia work up. This morning patient continues to complain of lower extremity pain, worse on right groin, states swelling has improved but still present and petechial rash and erythema still present.     * No surgery found *      Hospital Course:  Vasculitis workup during her hospital stay was  negative.  She was refusing to work with physical therapy states legs were still hurting her right leg worse in the left leg.  Full workup was negative.  She became very I rate irritated and quite manic.  Psychiatry was consulted and PEC'd the patient and eventually was transferred to a psych hospital.  Orthopedics was consulted for her or arthralgias and deemed nothing was warranted from their part.  Otherwise she is stable vital signs stable she was afebrile no chest pain.  She was medically cleared for discharge to the psych unit.  Please see previous notes for any further details medicine reconciliation was completed added risperidone per recommendation from Psychiatry.  She was in stable condition in his ready for discharge.    Consults:   Consults (From admission, onward)          Status Ordering Provider     Inpatient consult to Psychiatry  Once        Provider:  [facility]    Completed IGGY GRIFFITH     Inpatient consult to Orthopedic Surgery  Once        Provider:  Brian Diaz MD    Acknowledged IGGY GRIFFITH     Inpatient consult to Social Work/Case Management  Once        Provider:  (Not yet assigned)    Completed IGGY GRIFFITH            Final Active Diagnoses:    Diagnosis Date Noted POA    PRINCIPAL PROBLEM:  Bilateral lower extremity pain [M79.604, M79.605] 09/05/2024 Yes    Anemia [D64.9] 09/06/2024 Yes    Leukopenia [D72.819] 09/05/2024 Yes    Chronic idiopathic thrombocytopenic purpura [D69.3] 09/28/2022 Yes    Hypertension [I10] 09/28/2022 Yes    Arthritis [M19.90] 09/28/2022 Yes      Problems Resolved During this Admission:      Discharged Condition: stable    Disposition: Psychiatric Hospital    Follow Up:    Patient Instructions:   No discharge procedures on file.  Medications:  Reconciled Home Medications:      Medication List        START taking these medications      risperiDONE 0.5 MG Tab  Commonly known as: RISPERDAL  Take 1 tablet (0.5 mg  total) by mouth 2 (two) times daily.            CHANGE how you take these medications      pantoprazole 40 MG tablet  Commonly known as: PROTONIX  Take 1 tablet (40 mg total) by mouth once daily.  What changed: additional instructions            CONTINUE taking these medications      amLODIPine 5 MG tablet  Commonly known as: NORVASC  Take 5 mg by mouth daily as needed (taken if the pressure is high).     clonazePAM 0.5 MG tablet  Commonly known as: KlonoPIN  Take 0.5 mg by mouth 2 (two) times daily. BRAND NAME ONLY  Second dose not taken on Tuesdays and Fridays     estradioL 10 mcg Tab  Commonly known as: VAGIFEM  Place 1 tablet vaginally twice a week. Tuesdays and Friday at 9:00 pm              Significant Diagnostic Studies: N/A    Pending Diagnostic Studies:       Procedure Component Value Units Date/Time    CHANTELL IgG by IFA [[id number]] Collected: 09/06/24 1342    Order Status: Sent Lab Status: In process Updated: 09/06/24 1421    Specimen: Blood           Indwelling Lines/Drains at time of discharge:   Lines/Drains/Airways       None                   Time spent on the discharge of patient: 33 minutes         ROMEO BAUTISTA MD  Department of Hospital Medicine  [facility] - 5th Floor Med Surg

## 2024-09-10 NOTE — PLAN OF CARE
Spoke to Velia at [facility] notified of escalating behaviors and we would like to have a provider see patient

## 2024-09-10 NOTE — NURSING
Notified patient friend, Pam Harper, that patient is in route to be transported to [facility] in [place]. Patient's secured medication was given to [facility].

## 2024-09-10 NOTE — PLAN OF CARE
09/10/24 1444   Final Note   Assessment Type Final Discharge Note   Anticipated Discharge Disposition Psych  (enedina Stoll)   Post-Acute Status   Post-Acute Authorization Placement   Post-Acute Placement Status Set-up Complete/Auth obtained   Coverage Medicare

## 2024-09-10 NOTE — NURSING
Nurses Note -- 4 Eyes      9/10/2024   0830      Skin assessed during: Q Shift Change      [x] No Altered Skin Integrity Present    [x]Prevention Measures Documented      [] Yes- Altered Skin Integrity Present or Discovered   [] LDA Added if Not in Epic (Describe Wound)   [] New Altered Skin Integrity was Present on Admit and Documented in LDA   [] Wound Image Taken    Wound Care Consulted? No    Attending Nurse:  Brando Almanzar RN/Staff Member:   michael
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
## 2024-09-10 NOTE — SUBJECTIVE & OBJECTIVE
Interval History:  on call for Dr Hamlin.  Vasculitis workup was normal.  She was walking down the hallway today with physical therapy.  However yesterday she did require security she was expressing signs of jem and was very accusatory towards the staff.  She was seen by psych here and has been PEC'd.  She was medically cleared for transfer to facility.      Review of Systems   Constitutional: Negative.  Negative for chills and fever.   HENT: Negative.     Eyes: Negative.    Respiratory: Negative.  Negative for cough and shortness of breath.    Cardiovascular: Negative.  Negative for chest pain, palpitations and leg swelling.   Gastrointestinal: Negative.  Negative for abdominal distention, abdominal pain, constipation, diarrhea, nausea and vomiting.   Endocrine: Negative.    Genitourinary: Negative.  Negative for dysuria and hematuria.   Musculoskeletal:  Positive for arthralgias and myalgias. Negative for back pain and joint swelling.   Skin: Negative.  Negative for rash.   Allergic/Immunologic: Negative.    Neurological: Negative.  Negative for dizziness, seizures and headaches.   Hematological: Negative.    Psychiatric/Behavioral: Negative.       Objective:     Vital Signs (Most Recent):  Temp: 97.4 °F (36.3 °C) (09/10/24 1100)  Pulse: 76 (09/10/24 1100)  Resp: 18 (09/08/24 1931)  BP: (!) 142/71 (09/10/24 1100)  SpO2: 99 % (09/10/24 1100) Vital Signs (24h Range):  Temp:  [97.4 °F (36.3 °C)-98.8 °F (37.1 °C)] 97.4 °F (36.3 °C)  Pulse:  [65-76] 76  SpO2:  [98 %-100 %] 99 %  BP: ()/(56-71) 142/71     Weight: 56.7 kg (125 lb)  Body mass index is 20.8 kg/m².    Intake/Output Summary (Last 24 hours) at 9/10/2024 1230  Last data filed at 9/10/2024 0258  Gross per 24 hour   Intake 840 ml   Output 1100 ml   Net -260 ml         Physical Exam  Eyes:      Pupils: Pupils are equal, round, and reactive to light.   Cardiovascular:      Rate and Rhythm: Normal rate.      Pulses: Normal pulses.      Heart sounds: No murmur  heard.  Pulmonary:      Effort: Pulmonary effort is normal.      Breath sounds: Normal breath sounds.   Abdominal:      General: Abdomen is flat. Bowel sounds are normal. There is no distension.      Palpations: Abdomen is soft.      Tenderness: There is no guarding.   Musculoskeletal:         General: Normal range of motion.      Cervical back: Normal range of motion and neck supple.   Skin:     General: Skin is warm.   Neurological:      General: No focal deficit present.      Mental Status: She is alert.   Psychiatric:         Mood and Affect: Mood normal.         Behavior: Behavior normal.             Significant Labs: All pertinent labs within the past 24 hours have been reviewed.  Recent Lab Results       None            Significant Imaging: I have reviewed all pertinent imaging results/findings within the past 24 hours.

## 2024-09-10 NOTE — PT/OT/SLP PROGRESS
Physical Therapy Treatment    Patient Name:  Amber Martínze   MRN:  [id number]    Recommendations:     Discharge therapy intensity: Moderate Intensity Therapy   Discharge Equipment Recommendations:  (TBD)  Barriers to discharge: Decreased caregiver support, Impaired mobility, and Ongoing medical needs    Assessment:     Amber Martínez is a 53 y.o. female.  She presents with the following impairments/functional limitations: weakness, impaired functional mobility, decreased safety awareness, impaired endurance, gait instability, impaired balance, decreased lower extremity function.    Rehab Prognosis: Good; patient would benefit from acute skilled PT services to address these deficits and reach maximum level of function.    Recent Surgery: * No surgery found *      Plan:     During this hospitalization, patient would benefit from acute PT services 5 x/week to address the identified rehab impairments via therapeutic activities, gait training, therapeutic exercises and progress toward the following goals:    Plan of Care Expires:  10/12/24    Subjective     Chief Complaint: minimal groin pain    Objective:     Communicated with nurse prior to session.  Patient found supine with peripheral IV upon PT entry to room.     General Precautions: Standard, fall  Orthopedic Precautions: N/A  Braces: N/A  Respiratory Status: Room air  Blood Pressure:    Skin Integrity: Visible skin intact      Functional Mobility:  SBA to get EOB and min assist STS  Gait 100 ft x 2 RW min assist.     Education Provided:  Role and goals of PT, transfer training, bed mobility, gait training, balance training, safety awareness, assistive device, strengthening exercises, and importance of participating in PT to return to PLOF.     Patient left up in chair with call button in reach    GOALS:   Multidisciplinary Problems       Physical Therapy Goals          Problem: Physical Therapy    Goal Priority Disciplines Outcome Goal Variances Interventions    Physical Therapy Goal     PT, PT/OT Progressing     Description: Goals to be met by: d/c     Patient will increase functional independence with mobility by performin. Supine to sit with Stand-by Assistance  2. Sit to supine with Stand-by Assistance  3. Sit to stand transfer with Stand-by Assistance  4. Bed to chair transfer with Stand-by Assistance using Rolling Walker  5. Gait  x 100 feet with Stand-by Assistance using Rolling Walker.   6. Ascend/descend 4 stair without Handrails Minimal Assistance using Least Restrictive Assistive Device.                          Time Tracking:     Billable Minutes: Gait Training 23    Treatment Type: Treatment  PT/PTA: PTA     Number of PTA visits since last PT visit: 1     09/10/2024

## 2024-09-10 NOTE — PROGRESS NOTES
[facility] - 5th Floor [facility] Medicine  Progress Note    Patient Name: Amber Martínez  MRN: [id number]  Patient Class: IP- Inpatient   Admission Date: 9/5/2024  Length of Stay: 4 days  Attending Physician: Elle Marc, *  Primary Care Provider: Elle Marc MD        Subjective:     Principal Problem:Bilateral lower extremity pain        HPI:  Ms Clark is a 52 y/o female patient with hypertension, GERD,  ITP, anxiety and chronic lower extremity pain and swelling that presented to ED with complaints of worsening lower extremity pain and weakness. Patient states she has not been able to ambulate due to pain of lower extremities. Patient recently established care with me, states symptoms started approximately 2 months ago with progressively worsened swelling and of lower extremities, has been seeing a Ortho physician and has hadextensive workup but does not have results for this, records were requested this week when patient was seen in office but have not been received yet. Patient states since office visit on Tuesday lower extremity pain and swelling continued to worsen where she could not stand due to pain, states right lower extremity swelling was worse than left. This morning swelling has improved, patient presents with petechial rash and erythema of bilateral lower extremities. In ED venous US of bilateral lower extremities was obtained which was negative for DVT or venous insufficiency. Admission labs significant for hemoglobin of 7.6 and hematocrit of 24.0. Platelets of 159. Patient has been admitted for anemia work up. This morning patient continues to complain of lower extremity pain, worse on right groin, states swelling has improved but still present and petechial rash and erythema still present.     Overview/Hospital Course:  No notes on file    Interval History:  on call for Dr Hamlin.  Vasculitis workup was normal.  She was walking down the hallway today  with physical therapy.  However yesterday she did require security she was expressing signs of jem and was very accusatory towards the staff.  She was seen by psych here and has been PEC'd.  She was medically cleared for transfer to facility.      Review of Systems   Constitutional: Negative.  Negative for chills and fever.   HENT: Negative.     Eyes: Negative.    Respiratory: Negative.  Negative for cough and shortness of breath.    Cardiovascular: Negative.  Negative for chest pain, palpitations and leg swelling.   Gastrointestinal: Negative.  Negative for abdominal distention, abdominal pain, constipation, diarrhea, nausea and vomiting.   Endocrine: Negative.    Genitourinary: Negative.  Negative for dysuria and hematuria.   Musculoskeletal:  Positive for arthralgias and myalgias. Negative for back pain and joint swelling.   Skin: Negative.  Negative for rash.   Allergic/Immunologic: Negative.    Neurological: Negative.  Negative for dizziness, seizures and headaches.   Hematological: Negative.    Psychiatric/Behavioral: Negative.       Objective:     Vital Signs (Most Recent):  Temp: 97.4 °F (36.3 °C) (09/10/24 1100)  Pulse: 76 (09/10/24 1100)  Resp: 18 (09/08/24 1931)  BP: (!) 142/71 (09/10/24 1100)  SpO2: 99 % (09/10/24 1100) Vital Signs (24h Range):  Temp:  [97.4 °F (36.3 °C)-98.8 °F (37.1 °C)] 97.4 °F (36.3 °C)  Pulse:  [65-76] 76  SpO2:  [98 %-100 %] 99 %  BP: ()/(56-71) 142/71     Weight: 56.7 kg (125 lb)  Body mass index is 20.8 kg/m².    Intake/Output Summary (Last 24 hours) at 9/10/2024 1230  Last data filed at 9/10/2024 0258  Gross per 24 hour   Intake 840 ml   Output 1100 ml   Net -260 ml         Physical Exam  Eyes:      Pupils: Pupils are equal, round, and reactive to light.   Cardiovascular:      Rate and Rhythm: Normal rate.      Pulses: Normal pulses.      Heart sounds: No murmur heard.  Pulmonary:      Effort: Pulmonary effort is normal.      Breath sounds: Normal breath sounds.    Abdominal:      General: Abdomen is flat. Bowel sounds are normal. There is no distension.      Palpations: Abdomen is soft.      Tenderness: There is no guarding.   Musculoskeletal:         General: Normal range of motion.      Cervical back: Normal range of motion and neck supple.   Skin:     General: Skin is warm.   Neurological:      General: No focal deficit present.      Mental Status: She is alert.   Psychiatric:         Mood and Affect: Mood normal.         Behavior: Behavior normal.             Significant Labs: All pertinent labs within the past 24 hours have been reviewed.  Recent Lab Results       None            Significant Imaging: I have reviewed all pertinent imaging results/findings within the past 24 hours.    Assessment/Plan:      * Bilateral lower extremity pain  This is chronic and has progressively worsened over the past 3 months- unable to ambulate due to pain  Unclear etiology. Venous doppler US of bilateral lower extremities negative for DVT and venous insufficiency   CRP and ESR elevated   CHANTELL, DS DNA, ANCA negative ruling out autoimmune vasculitis process   Patient has extensive medication allergy- would not take anything in addition to her home medications  Declining to participate with PT due to pain but states she is not able to take any medications besides Tylenol for pain        Anemia  Significant drop in Hb as compared to 1 month ago  Hb of 7.6 on admission, dropped to 6.5 09/06, now 9.6 after 2 units of PRBCs on 09/06 and has remained stable since   Occult blood in stool negative   Denies hematochezia or melena       Leukopenia  This is chronic  Follows with Hematology- seems have fluctuated between 2-3 in the last 3 months   Stable       Hypertension  Continue amlodipine 5 mg daily     Chronic idiopathic thrombocytopenic purpura  Platelets of 175, will monitor  No signs of bleeding  Follows with Hematology as outpatient     Arthritis  CHANTELL, Anca negative ruling out vasculitis    Sed rate and CRP are elevated.  Presents with effusion of left knee, not enough fluid for drainage.      VTE Risk Mitigation (From admission, onward)           Ordered     IP VTE HIGH RISK PATIENT  Once         09/05/24 2115                    Discharge Planning   ENDY:      Code Status: Full Code   Is the patient medically ready for discharge?:     Reason for patient still in hospital (select all that apply): Treatment  Discharge Plan A: Home          Patient exhibiting signs of jem, very accusatory.  She was seen by psych recommended inpatient psych evaluation.  She has been PEC'd.  She was medically cleared for transfer.            ORMEO BAUTISTA MD  Department of Hospital Medicine   [facility] - 5th Floor Med Surg

## 2024-09-11 ENCOUNTER — PATIENT OUTREACH (OUTPATIENT)
Dept: ADMINISTRATIVE | Facility: CLINIC | Age: 54
End: 2024-09-11
Payer: MEDICARE

## 2024-09-13 ENCOUNTER — TELEPHONE (OUTPATIENT)
Dept: INTERNAL MEDICINE | Facility: CLINIC | Age: 54
End: 2024-09-13
Payer: MEDICARE

## 2024-09-13 LAB — ANA SER QL HEP2 SUBST: NORMAL

## 2024-09-24 ENCOUNTER — TELEPHONE (OUTPATIENT)
Dept: INTERNAL MEDICINE | Facility: CLINIC | Age: 54
End: 2024-09-24
Payer: MEDICARE

## 2024-09-24 NOTE — TELEPHONE ENCOUNTER
----- Message from [place]ia Henning sent at 9/24/2024  1:47 PM CDT -----  Regarding: appt  Appt [place] 9/26/24  ----- Message -----  From: Komal Feldman MA  Sent: 9/23/2024   8:11 AM CDT  To: #      ----- Message -----  From: Leland Velasquez  Sent: 9/23/2024   8:06 AM CDT  To: [facility] Staff    .Who Called: Amber Martínez    Caller is requesting Hospital Follow up appointment    When is the first available appointment?  Options offered (Virtual Visit, Urgent Care):   Symptoms:Hosp F/U 09/10/24 [facility]       Preferred Method of Contact: Phone Call  Patient's Preferred Phone Number on File: [phone]   Best Call Back Number, if different:  Additional Information:

## 2024-09-26 ENCOUNTER — LAB VISIT (OUTPATIENT)
Dept: LAB | Facility: HOSPITAL | Age: 54
End: 2024-09-26
Attending: STUDENT IN AN ORGANIZED HEALTH CARE EDUCATION/TRAINING PROGRAM
Payer: MEDICARE

## 2024-09-26 ENCOUNTER — TELEPHONE (OUTPATIENT)
Dept: INTERNAL MEDICINE | Facility: CLINIC | Age: 54
End: 2024-09-26

## 2024-09-26 ENCOUNTER — OFFICE VISIT (OUTPATIENT)
Dept: INTERNAL MEDICINE | Facility: CLINIC | Age: 54
End: 2024-09-26
Payer: MEDICARE

## 2024-09-26 VITALS
OXYGEN SATURATION: 100 % | HEART RATE: 83 BPM | WEIGHT: 114 LBS | HEIGHT: 65 IN | BODY MASS INDEX: 18.99 KG/M2 | DIASTOLIC BLOOD PRESSURE: 73 MMHG | TEMPERATURE: 98 F | SYSTOLIC BLOOD PRESSURE: 133 MMHG

## 2024-09-26 DIAGNOSIS — D64.9 ANEMIA, UNSPECIFIED TYPE: ICD-10-CM

## 2024-09-26 DIAGNOSIS — D64.9 ANEMIA, UNSPECIFIED TYPE: Primary | ICD-10-CM

## 2024-09-26 DIAGNOSIS — M79.604 PAIN AND SWELLING OF LOWER EXTREMITY, RIGHT: ICD-10-CM

## 2024-09-26 DIAGNOSIS — Z09 HOSPITAL DISCHARGE FOLLOW-UP: ICD-10-CM

## 2024-09-26 DIAGNOSIS — M79.89 PAIN AND SWELLING OF LOWER EXTREMITY, RIGHT: ICD-10-CM

## 2024-09-26 DIAGNOSIS — I10 HYPERTENSION, UNSPECIFIED TYPE: ICD-10-CM

## 2024-09-26 DIAGNOSIS — R60.0 BILATERAL LOWER EXTREMITY EDEMA: ICD-10-CM

## 2024-09-26 LAB
BASOPHILS # BLD AUTO: 0.03 X10(3)/MCL
BASOPHILS NFR BLD AUTO: 1 %
EOSINOPHIL # BLD AUTO: 0.01 X10(3)/MCL (ref 0–0.9)
EOSINOPHIL NFR BLD AUTO: 0.3 %
ERYTHROCYTE [DISTWIDTH] IN BLOOD BY AUTOMATED COUNT: 13.2 % (ref 11.5–17)
HCT VFR BLD AUTO: 35.5 % (ref 37–47)
HGB BLD-MCNC: 10.9 G/DL (ref 12–16)
IMM GRANULOCYTES # BLD AUTO: 0 X10(3)/MCL (ref 0–0.04)
IMM GRANULOCYTES NFR BLD AUTO: 0 %
LYMPHOCYTES # BLD AUTO: 1 X10(3)/MCL (ref 0.6–4.6)
LYMPHOCYTES NFR BLD AUTO: 32.5 %
MCH RBC QN AUTO: 30.5 PG (ref 27–31)
MCHC RBC AUTO-ENTMCNC: 30.7 G/DL (ref 33–36)
MCV RBC AUTO: 99.4 FL (ref 80–94)
MONOCYTES # BLD AUTO: 0.35 X10(3)/MCL (ref 0.1–1.3)
MONOCYTES NFR BLD AUTO: 11.4 %
NEUTROPHILS # BLD AUTO: 1.69 X10(3)/MCL (ref 2.1–9.2)
NEUTROPHILS NFR BLD AUTO: 54.8 %
NRBC BLD AUTO-RTO: 0 %
PLATELET # BLD AUTO: 163 X10(3)/MCL (ref 130–400)
PMV BLD AUTO: 10.8 FL (ref 7.4–10.4)
RBC # BLD AUTO: 3.57 X10(6)/MCL (ref 4.2–5.4)
WBC # BLD AUTO: 3.08 X10(3)/MCL (ref 4.5–11.5)

## 2024-09-26 PROCEDURE — 85025 COMPLETE CBC W/AUTO DIFF WBC: CPT

## 2024-09-26 PROCEDURE — 36415 COLL VENOUS BLD VENIPUNCTURE: CPT

## 2024-09-26 RX ORDER — AMLODIPINE BESYLATE 5 MG/1
5 TABLET ORAL DAILY
Qty: 30 TABLET | Refills: 3 | Status: SHIPPED | OUTPATIENT
Start: 2024-09-26

## 2024-09-26 NOTE — LETTER
AUTHORIZATION FOR RELEASE OF   CONFIDENTIAL INFORMATION    Dear ***,    We are seeing Amber Martínez, date of birth 1970, in the clinic at [place] [address] [facility]. Elle Marc MD is the patient's PCP. Amber Martínez has an outstanding lab/procedure at the time we reviewed her chart. In order to help keep her health information updated, she has authorized us to request the following medical record(s):        (  )  MAMMOGRAM                                      (  )  COLONOSCOPY      (  )  PAP SMEAR                                          (  )  OUTSIDE LAB RESULTS     (  )  DEXA SCAN                                          (  )  EYE EXAM            (  )  FOOT EXAM                                          (  )  ENTIRE RECORD     (  )  OUTSIDE IMMUNIZATIONS                 (  )  _______________         Please fax records to Elle Marc MD, ***     If you have any questions, please contact *** at [phone].           Patient Name: Amber Martínez  : 1970  Patient Phone #: [phone]

## 2024-09-26 NOTE — TELEPHONE ENCOUNTER
----- Message from Samantha Patel sent at 9/26/2024  8:50 AM CDT -----  Who Called: [facility] - Dr. Burnett    Caller is requesting assistance/information from provider's office.    Symptoms (please be specific):    How long has patient had these symptoms:    List of preferred pharmacies on file (remove unneeded): [unfilled]  If different, enter pharmacy into here including location and phone number:       Preferred Method of Contact: Phone Call  Patient's Preferred Phone Number on File: [phone]   Best Call Back Number, if different:[phone]  Additional Information: Office states pt states she has never been on this medication risperiDONE (RISPERDAL) 0.5 MG Tab to look on  if needed.

## 2024-09-26 NOTE — LETTER
AUTHORIZATION FOR RELEASE OF   CONFIDENTIAL INFORMATION    Dear medical records,    We are seeing Amber Martínez, date of birth 1970, in the clinic at [place] [address] [facility]. Elle Marc MD is the patient's PCP. Amber Martínez has an outstanding lab/procedure at the time we reviewed her chart. In order to help keep her health information updated, she has authorized us to request the following medical record(s):        (  )  MAMMOGRAM                                      (  )  COLONOSCOPY      (  )  PAP SMEAR                                          (  )  OUTSIDE LAB RESULTS     (  )  DEXA SCAN                                          (  )  EYE EXAM            (  )  FOOT EXAM                                          ( X )  ENTIRE RECORD     (  )  OUTSIDE IMMUNIZATIONS                 (  )  _______________         Please fax records to Elle Marc MD, [phone]     If you have any questions, please contact us at [phone].           Patient Name: Amber Martínez  : 1970  Patient Phone #: [phone]

## 2024-09-26 NOTE — LETTER
AUTHORIZATION FOR RELEASE OF   CONFIDENTIAL INFORMATION    Dear ***,    We are seeing Amber Martínez, date of birth 1970, in the clinic at [place] [address] [facility]. Elle Marc MD is the patient's PCP. Amber Martínez has an outstanding lab/procedure at the time we reviewed her chart. In order to help keep her health information updated, she has authorized us to request the following medical record(s):        (  )  MAMMOGRAM                                      (  )  COLONOSCOPY      (  )  PAP SMEAR                                          (  )  OUTSIDE LAB RESULTS     (  )  DEXA SCAN                                          (  )  EYE EXAM            (  )  FOOT EXAM                                          (  )  ENTIRE RECORD     (  )  OUTSIDE IMMUNIZATIONS                 (  )  _______________         Please fax records to Elle Marc MD, ***     If you have any questions, please contact *** at [phone].           Patient Name: Amber Martínez  : 1970  Patient Phone #: [phone]

## 2024-09-26 NOTE — TELEPHONE ENCOUNTER
Atc Dr. Johnson office, m for nurse to return the call. Need to clarify what was told to the patient today about a procedure

## 2024-09-26 NOTE — LETTER
AUTHORIZATION FOR RELEASE OF   CONFIDENTIAL INFORMATION    Dear medical records,    We are seeing Amber Martínez, date of birth 1970, in the clinic at [place] [address] [facility]. Elle Marc MD is the patient's PCP. Amber Martínez has an outstanding lab/procedure at the time we reviewed her chart. In order to help keep her health information updated, she has authorized us to request the following medical record(s):        (  )  MAMMOGRAM                                      (  )  COLONOSCOPY      (  )  PAP SMEAR                                          (  )  OUTSIDE LAB RESULTS     (  )  DEXA SCAN                                          (  )  EYE EXAM            (  )  FOOT EXAM                                          (  )  ENTIRE RECORD     (  )  OUTSIDE IMMUNIZATIONS                 ( X ) OV notes         Please fax records to Elle Marc MD, [phone]     If you have any questions, please contact us at [phone].           Patient Name: Amber Martínez  : 1970  Patient Phone #: [phone]

## 2024-09-26 NOTE — LETTER
AUTHORIZATION FOR RELEASE OF   CONFIDENTIAL INFORMATION    Dear ***,    We are seeing Amber Martínez, date of birth 1970, in the clinic at [place] [address] [facility]. Elle Marc MD is the patient's PCP. Amber Martínez has an outstanding lab/procedure at the time we reviewed her chart. In order to help keep her health information updated, she has authorized us to request the following medical record(s):        (  )  MAMMOGRAM                                      (  )  COLONOSCOPY      (  )  PAP SMEAR                                          (  )  OUTSIDE LAB RESULTS     (  )  DEXA SCAN                                          (  )  EYE EXAM            (  )  FOOT EXAM                                          (  )  ENTIRE RECORD     (  )  OUTSIDE IMMUNIZATIONS                 (  )  _______________         Please fax records to Elle Marc MD, ***     If you have any questions, please contact *** at [phone].           Patient Name: Amber Martínez  : 1970  Patient Phone #: [phone]

## 2024-09-27 ENCOUNTER — TELEPHONE (OUTPATIENT)
Dept: INTERNAL MEDICINE | Facility: CLINIC | Age: 54
End: 2024-09-27
Payer: MEDICARE

## 2024-09-27 NOTE — TELEPHONE ENCOUNTER
----- Message from Apryl Ervin sent at 9/27/2024  9:08 AM CDT -----  .Type:  Patient Returning Call    Who Called:pt  Who Left Message for Patient:pt  Does the patient know what this is regarding?:referral   Would the patient rather a call back or a response via [facility]?   Best Call Back Number:[phone]  Additional Information: Please resend the referral to [facility]

## 2024-10-02 PROBLEM — R60.0 BILATERAL LOWER EXTREMITY EDEMA: Status: ACTIVE | Noted: 2024-09-05

## 2024-10-02 PROBLEM — Z09 HOSPITAL DISCHARGE FOLLOW-UP: Status: ACTIVE | Noted: 2024-10-02

## 2024-10-02 NOTE — ASSESSMENT & PLAN NOTE
Patient : Bill Griffin Age: 66 year old Sex: male   MRN: [id number] Encounter Date: 5/10/2023    History     Chief Complaint   Patient presents with   • Abdominal Pain       HPI    Bill Griffin is a 66 year old presenting to the emergency department for right upper quadrant pain that he has had for the past 4 days.  He endorses nausea .  He states his pain is worse when he palpates it. He endorses decreased appetite and nonbloody diarrhea.  He denies any vomiting.  His wife states he did have a fever yesterday with a temp of a 100°F.  He endorses shortness of breath secondary to the pain.  He denies any chest pain. Patient endorses history of appendectomy, but denies any other abdominal surgeries.  He endorses social alcohol use. There are no further complaints or modifying factors at this time.           No Known Allergies    No current facility-administered medications for this encounter.     Current Outpatient Medications   Medication Sig   • fenofibrate (TRICOR) 145 MG tablet Take 145 mg by mouth daily.   • ondansetron (ZOFRAN ODT) 4 MG disintegrating tablet Place 1 tablet onto the tongue every 6 hours.   • famotidine (Pepcid) 20 MG tablet Take 1 tablet by mouth in the morning and 1 tablet in the evening.   • hydrALAZINE (APRESOLINE) 50 MG tablet Take 50 mg by mouth 3 times daily. Indications: High Blood Pressure Disorder   • pantoprazole (PROTONIX) 40 MG tablet Take 1 tablet by mouth nightly.   • folic acid (FOLATE) 400 MCG tablet Take 800 mcg by mouth 2 times daily. Dose: 2 tablets ( = 800 mg)   • amLODIPine (NORVASC) 10 MG tablet Take 10 mg by mouth daily.   • fenofibrate 160 MG tablet Take 160 mg by mouth daily.   • pravastatin (PRAVACHOL) 40 MG tablet Take 20 mg by mouth daily. Dose: 1/2 tablet ( = 20 mg)   • Nebivolol HCl 20 MG Tab Take 20 mg by mouth daily.   • niacin 1000 MG extended-release tablet Take 1,000 mg by mouth daily.   • potassium citrate ER 10 MEQ (1080 MG) Tab CR Take by mouth as  Stable since discharge   Ambulating with walker      directed. Take 3 tablets ( = 30 meq) by mouth in the morning and 2 tablets ( = 20 meq) in the evening   • tiotropium (SPIRIVA HANDIHALER) 18 MCG capsule for inhaler Place 18 mcg into inhaler and inhale daily.   • Multiple Vitamins-Minerals (vitamin - therapeutic multivitamins w/minerals) tablet Take 1 tablet by mouth daily.   • Flaxseed, Linseed, (Flaxseed Oil Max Str) 1300 MG Cap Take 1,300 mg by mouth 2 times daily.   • aspirin 81 MG EC tablet Take 81 mg by mouth daily.    • lisinopril (PRINIVIL,ZESTRIL) 40 MG tablet Take 80 mg by mouth daily. Dose: 2 tablets ( = 80 mg)       Past Medical History:   Diagnosis Date   • Arthritis     ankles, knees   • COPD (chronic obstructive pulmonary disease) (CMS/HCC)    • Diverticulitis 04/2021   • Essential (primary) hypertension    • Fracture 02/2021    Right distal fibula   • Gastroesophageal reflux disease    • High cholesterol        Past Surgical History:   Procedure Laterality Date   • Appendectomy      2 years old    • Colonoscopy diagnostic  10/5/2011    normal, f/u 10 yrs, Dr Abraham   • Past surgical history      9 years old        Family History   Problem Relation Age of Onset   • Diabetes Father    • Diabetes Brother        Social History     Tobacco Use   • Smoking status: Some Days     Current packs/day: 0.25     Average packs/day: 0.3 packs/day for 30.0 years (7.5 pk-yrs)     Types: Cigarettes   • Smokeless tobacco: Never   Substance Use Topics   • Alcohol use: Yes     Alcohol/week: 3.0 standard drinks of alcohol     Types: 3 Cans of beer per week   • Drug use: Not Currently     Comment: marijuana quit 1970's out of college        Review of Systems     Review of Systems   Constitutional: Positive for appetite change (decreased appetite) and fever. Negative for chills.   HENT: Negative for congestion and sore throat.    Respiratory: Negative for cough and shortness of breath.    Cardiovascular: Negative for chest pain, palpitations and leg swelling.    Gastrointestinal: Positive for abdominal pain (RUQ pain), diarrhea and nausea. Negative for blood in stool and vomiting.   Genitourinary: Negative for difficulty urinating, dysuria, flank pain and hematuria.   Musculoskeletal: Negative for arthralgias, back pain and neck pain.   Skin: Negative for rash.   Neurological: Negative for dizziness, weakness, light-headedness, numbness and headaches.   All other systems reviewed and are negative.      Physical Exam     ED Triage Vitals [05/10/23 1234]   ED Triage Vitals Group      Temp 97.8 °F (36.6 °C)      Heart Rate 66      Resp 20      BP (!) 191/81      SpO2 96 %      EtCO2 mmHg       Height 5' 11\" (1.803 m)      Weight 224 lb (101.6 kg)      Weight Scale Used Standing scale      BMI (Calculated) 31.24      IBW/kg (Calculated) 75.3       Physical Exam  Vitals and nursing note reviewed.   Constitutional:       General: He is not in acute distress.     Appearance: He is not ill-appearing or diaphoretic.   HENT:      Head: Normocephalic and atraumatic.      Right Ear: External ear normal.      Left Ear: External ear normal.      Nose: Nose normal.      Mouth/Throat:      Mouth: Mucous membranes are moist.   Eyes:      Conjunctiva/sclera: Conjunctivae normal.      Pupils: Pupils are equal, round, and reactive to light.   Cardiovascular:      Rate and Rhythm: Normal rate and regular rhythm.   Pulmonary:      Effort: Pulmonary effort is normal. No respiratory distress.      Breath sounds: Normal breath sounds. No stridor. No wheezing, rhonchi or rales.   Chest:      Chest wall: No tenderness.   Abdominal:      General: Bowel sounds are normal. There is no distension.      Tenderness: There is abdominal tenderness in the right upper quadrant. There is no right CVA tenderness, left CVA tenderness, guarding or rebound.   Musculoskeletal:         General: No swelling, tenderness, deformity or signs of injury. Normal range of motion.      Cervical back: Normal range of motion  and neck supple.   Skin:     General: Skin is warm and dry.      Capillary Refill: Capillary refill takes less than 2 seconds.      Findings: No rash.   Neurological:      General: No focal deficit present.      Mental Status: He is alert and oriented to person, place, and time.      GCS: GCS eye subscore is 4. GCS verbal subscore is 5. GCS motor subscore is 6.      Cranial Nerves: No cranial nerve deficit.      Sensory: No sensory deficit.      Motor: No weakness.      Coordination: Coordination normal.      Gait: Gait normal.   Psychiatric:         Mood and Affect: Mood normal.         Behavior: Behavior normal.         Lab Results     Results for orders placed or performed during the hospital encounter of 05/10/23   Light Blue Top   Result Value Ref Range    Extra Tube Hold for Add Ons    Light Green Top   Result Value Ref Range    Extra Tube Hold for Add Ons    Light Green Top   Result Value Ref Range    Extra Tube Hold for Add Ons    Lavender Top   Result Value Ref Range    Extra Tube Hold for Add Ons    Gold Top   Result Value Ref Range    Extra Tube Hold for Add Ons    Comprehensive Metabolic Panel   Result Value Ref Range    Fasting Status      Sodium 139 135 - 145 mmol/L    Potassium 3.8 3.4 - 5.1 mmol/L    Chloride 110 97 - 110 mmol/L    Carbon Dioxide 26 21 - 32 mmol/L    Anion Gap 7 7 - 19 mmol/L    Glucose 169 (H) 70 - 99 mg/dL    BUN 19 6 - 20 mg/dL    Creatinine 0.86 0.67 - 1.17 mg/dL    Glomerular Filtration Rate >90 >=60    BUN/Cr 22 7 - 25    Calcium 9.8 8.4 - 10.2 mg/dL    Bilirubin, Total 0.3 0.2 - 1.0 mg/dL    GOT/AST 14 <=37 Units/L    GPT/ALT 29 <64 Units/L    Alkaline Phosphatase 75 45 - 117 Units/L    Albumin 3.9 3.6 - 5.1 g/dL    Protein, Total 7.8 6.4 - 8.2 g/dL    Globulin 3.9 2.0 - 4.0 g/dL    A/G Ratio 1.0 1.0 - 2.4   Urinalysis & Reflex Microscopy With Culture If Indicated   Result Value Ref Range    COLOR, URINALYSIS Straw     APPEARANCE, URINALYSIS Clear     GLUCOSE, URINALYSIS  Negative Negative mg/dL    BILIRUBIN, URINALYSIS Negative Negative    KETONES, URINALYSIS Negative Negative mg/dL    SPECIFIC GRAVITY, URINALYSIS 1.019 1.005 - 1.030    OCCULT BLOOD, URINALYSIS Negative Negative    PH, URINALYSIS 7.0 5.0 - 7.0    PROTEIN, URINALYSIS 30 (A) Negative mg/dL    UROBILINOGEN, URINALYSIS 0.2 0.2, 1.0 mg/dL    NITRITE, URINALYSIS Negative Negative    LEUKOCYTE ESTERASE, URINALYSIS Negative Negative    SQUAMOUS EPITHELIAL, URINALYSIS None Seen None Seen, 1 to 5 /hpf    ERYTHROCYTES, URINALYSIS 1 to 2 None Seen, 1 to 2 /hpf    LEUKOCYTES, URINALYSIS 1 to 5 None Seen, 1 to 5 /hpf    BACTERIA, URINALYSIS None Seen None Seen /hpf    HYALINE CASTS, URINALYSIS None Seen None Seen, 1 to 5 /lpf    AMORPHOUS MATERIAL Present     MUCUS Present    Lipase   Result Value Ref Range    Lipase 288 73 - 393 Units/L   CBC with Automated Differential (performable only)   Result Value Ref Range    WBC 8.2 4.2 - 11.0 K/mcL    RBC 4.88 4.50 - 5.90 mil/mcL    HGB 15.0 13.0 - 17.0 g/dL    HCT 43.6 39.0 - 51.0 %    MCV 89.3 78.0 - 100.0 fl    MCH 30.7 26.0 - 34.0 pg    MCHC 34.4 32.0 - 36.5 g/dL    RDW-CV 12.8 11.0 - 15.0 %    RDW-SD 41.8 39.0 - 50.0 fL     140 - 450 K/mcL    NRBC 0 <=0 /100 WBC    Neutrophil, Percent 65 %    Lymphocytes, Percent 22 %    Mono, Percent 8 %    Eosinophils, Percent 4 %    Basophils, Percent 1 %    Immature Granulocytes 0 %    Absolute Neutrophils 5.4 1.8 - 7.7 K/mcL    Absolute Lymphocytes 1.8 1.0 - 4.0 K/mcL    Absolute Monocytes 0.6 0.3 - 0.9 K/mcL    Absolute Eosinophils  0.3 0.0 - 0.5 K/mcL    Absolute Basophils 0.1 0.0 - 0.3 K/mcL    Absolute Immature Granulocytes 0.0 0.0 - 0.2 K/mcL   TROPONIN I, HIGH SENSITIVITY   Result Value Ref Range    Troponin I, High Sensitivity 10 <77 ng/L   NT proBNP   Result Value Ref Range    NT-proBNP 153 (H) <=125 pg/mL   ISTAT8 VENOUS  POINT OF CARE   Result Value Ref Range    BUN - POINT OF CARE 19 6 - 20 mg/dL    SODIUM - POINT OF CARE 144  135 - 145 mmol/L    POTASSIUM - POINT OF CARE 3.9 3.4 - 5.1 mmol/L    CHLORIDE - POINT OF CARE 107 97 - 110 mmol/L    TCO2 - POINT OF CARE 27 (H) 19 - 24 mmol/L    ANION GAP - POINT OF CARE 15 7 - 19 mmol/L    HEMATOCRIT - POINT OF CARE 46.0 39.0 - 51.0 %    HEMOGLOBIN - POINT OF CARE 15.6 13.0 - 17.0 g/dL    GLUCOSE - POINT OF CARE 165 (H) 70 - 99 mg/dL    CALCIUM, IONIZED - POINT OF CARE 1.28 1.15 - 1.29 mmol/L    Creatinine 0.90 0.67 - 1.17 mg/dL    Glomerular Filtration Rate >90 >=60   LACTIC ACID VENOUS WITH REFLEX - POINT OF CARE   Result Value Ref Range    LACTIC ACID, VENOUS - POINT OF CARE 1.1 <2.0 mmol/L   TROPONIN I  POINT OF CARE   Result Value Ref Range    TROPONIN I - POINT OF CARE <0.10 <0.10 ng/mL       EKG         Radiology Results     Imaging Results          US Liver / Gallbladder / Pancreas (Final result)  Result time 05/10/23 16:11:24    Final result                 Impression:    IMPRESSION:    No acute sonographic abnormality in the right upper quadrant    Small, echogenic focus along the gallbladder wall measuring 3 x 2 x 3 mm,  which could represent a small gallstone or gallbladder polyp. No specific  follow-up is necessary.               Narrative:    EXAM: ULTRASOUND LIVER/GALLBLADDER/PANCREAS    HISTORY:  Right upper quadrant pain.    COMPARISON:  4/13/2021.    TECHNIQUE:  Ultrasound of the right upper quadrant of the abdomen to  evaluate for gallbladder disease was performed.    FINDINGS:  The liver is normal in size. Diffusely increased echogenicity of  the hepatic parenchyma. Focal fatty sparing is noted along the gallbladder  fossa. No intrahepatic biliary ductal dilation or focal hepatic lesion..    The gallbladder is nondilated. A small, echogenic focus measuring 3 x 2 x 3  mm along the gallbladder wall. Biliary sludge is also noted No  pericholecystic fluid. No wall thickening. The sonographic Hawthorne sign is  reported as negative.    The common bile duct measures 2 mm in  diameter.    Duplex Doppler evaluation of the portal vein demonstrates normal  hepatopetal flow.    The visualized pancreas appears normal    The right kidney is normal in sonographic appearance. No hydronephrosis.     There is no free fluid.                               XR CHEST AP OR PA - PORTABLE (Final result)  Result time 05/10/23 15:22:56    Final result                 Impression:    FINDINGS/IMPRESSION:    The cardiomediastinal silhouette is borderline enlarged and pulmonary  vessels are within normal limits for portable technique. Prominent  interstitial lungs markings. Otherwise, no focal alveolar consolidations.  No significant pleural effusion or appreciable pneumothorax.   Atherosclerosis.               Narrative:    XR CHEST AP OR PA    HISTORY:  . Portable sob    COMPARISON: 2/25/2017    TECHNIQUE:  Single, AP upright view of the chest.                                ED Medications     Medications   morphine injection 4 mg (4 mg Intravenous Given 5/10/23 1623)   sodium chloride (NORMAL SALINE) 0.9 % bolus 1,000 mL (0 mLs Intravenous Completed 5/10/23 1652)         ED Course     Vitals:    05/10/23 1618 05/10/23 1633 05/10/23 1701 05/10/23 1730   BP: (!) 182/80 (!) 143/64 (!) 175/77 (!) 163/70   Pulse: (!) 53 (!) 54 (!) 57 (!) 51   Resp:    20   Temp:       SpO2: 93% 96% 96% 96%   Weight:       Height:                Radiology Review: I have independently interpreted the Chest X-Ray and have found No acute or active disease.  I am awaiting on the final radiology read.            Consults                    MDM                             Patient is a 66 year old with complaint of RUQ pain, nausea, diarrhea and fever for the past 4 days. He is hypertensive and bradycardiac, otherwise vital signs are unremarkable.   My differential diagnosis includes but is not limited to gastric reflux, peptic ulcer disease, pancreatitis, cholelithiasis, cholecystitis, pneumonia, muscle strain. Lower suspicion for  kidney stone given no urinary symptoms, flank pain or hematuria seen on UA. His blood work was unremarkable and there was no leukocytosis or anemia. CMP and lipase unremarkable. UA with no signs of infection. EKG showed sinus bradycardia with 1st degree AV block, with no change when compared to previous EKG. Normal troponin and slightly elevated BNP. Chest x-ray showed slightly borderline enlargement of heart, but no consolidations. US of liver/gallbladder/pancreas obtained which showed possible small gallstone/gallbladder polyp. Discussed low suspicion for cholithiasis given CT scan read, no + sonographic rivera sign, no leukocytosis or elevated LFTS. Pt reports improved pain in the ED.  We discussed the plan for the patient to return home. Referral to general surgery given as needed. Discussed with patient for a close ED follow-up with their PCP. Provided patient with strict return precautions. I advised the patient to return to the ED for any new or worsening symptoms. The patient understands and agrees with the plan. All questions answered.  The patient will not require admission.         Does the Patient have sepsis: NO     Critical Care       No Critical Care        Disposition       Clinical Impression and Diagnosis  12:34 AM       ED Diagnoses     Diagnosis Comment Associated Orders       Final diagnoses    Hypertension, unspecified type -- --    Abdominal pain, right upper quadrant -- --          Follow Up:  Priscilla Jovel,   [address] [facility] [address]  [facility] [address]  [phone]    Schedule an appointment as soon as possible for a visit   If new or worsening symptoms, As needed    [facility]  [address]  [place] [address] [phone]  Go to   If new or worsening symptoms, As needed    Edwar Del Toro MD  [address]  [place] [address] [phone]    Schedule an appointment as soon as possible for a visit   If new or worsening symptoms, As needed           Summary of your Discharge Medications      Take these Medications      Details   famotidine 20 MG tablet  Commonly known as: Pepcid   Take 1 tablet by mouth in the morning and 1 tablet in the evening.     ondansetron 4 MG disintegrating tablet  Commonly known as: ZOFRAN ODT   Place 1 tablet onto the tongue every 6 hours.            Pt is discharged to home/self care in stable condition.              Discharge 5/10/2023  5:51 PM  Bill Griffin discharge to home/self care.            __________________________________________    Shani Branch PA-C  Dictation # [id number]    Attending Provider: Dr. Gera Friend  Dictation # [id number]       Shani Branch PA-C  05/11/23 0043

## 2024-10-02 NOTE — PROGRESS NOTES
Subjective:      Amber Martínez  10/02/2024  [id number]      Chief Complaint: Follow-up (Hospital f/U) and Edema       HPI:  Ms Clark presents for hospital discharge follow up. Patient was admitted to the hospital due to lower extremity weakness and edema. Patient was PEC'd due to concerning behavior, records from [facility] have been reviewed, no new medications prescribed at that time. Patient is very anxious today, upset about care she received at the hospital. Lower extremity edema has improved, continues to have erythema around ankles, workup for vasculitis was negative in the hospital.     Past Medical History:   Diagnosis Date    Chronic ITP (idiopathic thrombocytopenia)     Crohn's disease     GERD (gastroesophageal reflux disease)     Hypertension     Leukopenia     Psychosomatic disorder     Severe anxiety      Past Surgical History:   Procedure Laterality Date    BONE MARROW BIOPSY  06/13/2016    DILATION AND CURETTAGE OF UTERUS  10/31/2018    PARTIAL HYSTERECTOMY       Family History   Problem Relation Name Age of Onset    Diabetes Mother      Hypertension Mother      Heart attack Mother      Heart disease Mother      Diabetes Father      Brain cancer Father      ALYSE disease Father      Heart disease Sister      Diabetes Brother       Social History     Tobacco Use    Smoking status: Never    Smokeless tobacco: Never   Substance and Sexual Activity    Alcohol use: Never    Drug use: Never    Sexual activity: Not on file     Review of patient's allergies indicates:   Allergen Reactions    Ketorolac      Other reaction(s): SOB, Stops breathing    Prednisone Shortness Of Breath     Other reaction(s): SOB    Amoxicillin-pot clavulanate      Other reaction(s): Hives, RASH    Azithromycin      Other reaction(s): RASH    Ciprofloxacin      Other reaction(s): Hives, RASH    Clindamycin      Other reaction(s): Rash    Diphenoxylate-atropine      Other reaction(s): Hives, RASH    Hydrocodone-acetaminophen       Hives, swelling    Iodine      Other reaction(s): Difficulty breathing, Hives, SOB, Swelling    Penicillin      Other reaction(s): Hives, RASH    Shellfish containing products      Other reaction(s): Hives, RASH    Sulfamethoxazole-trimethoprim      Other reaction(s): Hives, RASH    Atropine      Hives    Cefuroxime      Hives    Clonazepam Hives    Diphenoxylate      Hives    Doxycycline     Famotidine      Other Reaction(s): Unknown    Hydrocodone      Hives    Povidone-iodine      Other Reaction(s): Unknown    Sulfamethoxazole      Hives    Trimethoprim      Hives    Zoloft [sertraline] Other (See Comments)     Serotonin syndrome        The following were reviewed at this visit: active problem list, medication list, allergies, family history, social history, and health maintenance.    Medications:    Current Outpatient Medications:     clonazePAM (KLONOPIN) 0.5 MG tablet, Take 0.5 mg by mouth 2 (two) times daily. BRAND NAME ONLY Second dose not taken on Tuesdays and Fridays, Disp: , Rfl:     estradioL (VAGIFEM) 10 mcg Tab, Place 1 tablet vaginally twice a week. Tuesdays and Friday at 9:00 pm, Disp: , Rfl:     pantoprazole (PROTONIX) 40 MG tablet, Take 1 tablet (40 mg total) by mouth once daily. (Patient taking differently: Take 40 mg by mouth once daily. At 4:30 am), Disp: 30 tablet, Rfl: 0    amLODIPine (NORVASC) 5 MG tablet, Take 1 tablet (5 mg total) by mouth once daily., Disp: 30 tablet, Rfl: 3      Medications have been reviewed and reconciled with patient at this visit.  Barriers to medications reviewed with patient.    Adverse reactions to current medications reviewed with patient..    Over the counter medications reviewed and reconciled with patient.  Review of Systems   Constitutional:  Negative for chills, fever, malaise/fatigue and weight loss.   HENT:  Negative for congestion, ear discharge, ear pain, hearing loss, sinus pain and sore throat.    Eyes:  Negative for photophobia, pain, discharge and  "redness.   Respiratory:  Negative for cough, shortness of breath and wheezing.    Cardiovascular:  Negative for chest pain, palpitations and leg swelling.   Gastrointestinal:  Negative for constipation, diarrhea, heartburn, nausea and vomiting.   Genitourinary:  Negative for dysuria, frequency and urgency.   Musculoskeletal:  Negative for falls, joint pain and myalgias.   Skin:  Negative for itching and rash.   Neurological:  Negative for dizziness, focal weakness, weakness and headaches.   Psychiatric/Behavioral:  Negative for depression and memory loss. The patient is not nervous/anxious and does not have insomnia.            Objective:      Vitals:    09/26/24 0922 09/26/24 1021   BP: (!) 145/89 133/73   BP Location: Right arm Left arm   Pulse: 83    Temp: 98.1 °F (36.7 °C)    SpO2: 100%    Weight: 51.7 kg (114 lb)    Height: 5' 5" (1.651 m)        Physical Exam  Constitutional:       General: She is not in acute distress.     Appearance: Normal appearance.   HENT:      Head: Normocephalic and atraumatic.   Eyes:      Extraocular Movements: Extraocular movements intact.      Pupils: Pupils are equal, round, and reactive to light.   Cardiovascular:      Rate and Rhythm: Normal rate and regular rhythm.      Pulses: Normal pulses.      Heart sounds: Normal heart sounds.   Pulmonary:      Effort: Pulmonary effort is normal.      Breath sounds: Normal breath sounds.   Abdominal:      General: Abdomen is flat. Bowel sounds are normal. There is no distension.      Palpations: Abdomen is soft.      Tenderness: There is no abdominal tenderness.   Musculoskeletal:         General: Normal range of motion.      Cervical back: Normal range of motion and neck supple.      Right lower leg: Edema present.      Left lower leg: Edema present.   Lymphadenopathy:      Cervical: No cervical adenopathy.   Skin:     Findings: No lesion or rash.      Comments: Erythematous plaques seen in ankles    Neurological:      General: No focal " deficit present.      Mental Status: She is alert and oriented to person, place, and time.               Assessment and Plan:       1. Anemia, unspecified type  Assessment & Plan:  Will check CBC today     Orders:  -     CBC Auto Differential; Future; Expected date: 09/26/2024    2. Pain and swelling of lower extremity, right  -     Ambulatory referral/consult to Physical/Occupational Therapy; Future; Expected date: 09/26/2024    3. Hypertension, unspecified type  Assessment & Plan:  Controlled  Continue amlodipine 5 mg daily       4. Bilateral lower extremity edema  Assessment & Plan:  Improved since discharge from the hospital   Patient is able to ambulate with walker now       5. Hospital discharge follow-up  Assessment & Plan:  Stable since discharge   Ambulating with walker         Other orders  -     amLODIPine (NORVASC) 5 MG tablet; Take 1 tablet (5 mg total) by mouth once daily.  Dispense: 30 tablet; Refill: 3            Follow up: Follow up in about 3 months (around 12/26/2024) for Bp follow up.

## 2024-10-15 ENCOUNTER — TELEPHONE (OUTPATIENT)
Dept: INTERNAL MEDICINE | Facility: CLINIC | Age: 54
End: 2024-10-15
Payer: MEDICARE

## 2024-10-15 DIAGNOSIS — D72.819 LEUKOPENIA, UNSPECIFIED TYPE: ICD-10-CM

## 2024-10-15 DIAGNOSIS — D64.9 ANEMIA, UNSPECIFIED TYPE: Primary | ICD-10-CM

## 2024-10-15 DIAGNOSIS — I10 HYPERTENSION, UNSPECIFIED TYPE: ICD-10-CM

## 2024-10-15 NOTE — TELEPHONE ENCOUNTER
Patient has seen a gastrologist, they told her to go back to what she was eating but worried that would start her symptoms up again that's why she is thinking of a dietician.    Advised of results but the ferrous sulfate 325mg makes her sick.

## 2024-10-15 NOTE — TELEPHONE ENCOUNTER
----- Message from Tech Marysol sent at 10/15/2024  8:26 AM CDT -----  .Type:  Patient Requesting Referral    Who Called: pt    Does the patient already have the specialty appointment scheduled?: no    If yes, what is the date of that appointment?: no    Referral to What Specialty: Dietician    Reason for Referral: not eating pt has acid reflux and Gerd     Does the patient want the referral with a specific physician?: any    Is the specialist an [facility]?: any    Patient Requesting a Response?: yes    Would the patient rather a call back or a response via [facility]?      Best Call Back Number: [phone]    Additional Information:  please send thanks

## 2024-10-16 ENCOUNTER — TELEPHONE (OUTPATIENT)
Dept: INTERNAL MEDICINE | Facility: CLINIC | Age: 54
End: 2024-10-16
Payer: MEDICARE

## 2024-10-16 DIAGNOSIS — R63.4 WEIGHT LOSS: Primary | ICD-10-CM

## 2024-10-16 NOTE — TELEPHONE ENCOUNTER
----- Message from Med Assistant Sauceda sent at 10/16/2024  3:06 PM CDT -----  Regarding: FW: Nutrition Referral  Is there another diagnosis we could use for the patient to get the nutritional referral?  ----- Message -----  From: Carmen Knapp  Sent: 10/16/2024   1:56 PM CDT  To: [facility] Staff  Subject: Nutrition Referral                               Good afternoon, I am attempting to schedule Mrs. Clark for a nutrition class, however the system is stating her insurance will not cover the class with the DX of Anemia. Does this patient have any other diagnosis that would warrant the nutrition consult?     Thank you,     Carmen Knapp  Access Navigator  [facility]   Referral Scheduling Department

## 2024-10-16 NOTE — TELEPHONE ENCOUNTER
----- Message from Carmen sent at 10/16/2024  3:28 PM CDT -----  Regarding: FW: Nutrition Referral  Komal, I am unable to reply to the message since the encounter is signed. Can you please add dx to order or replace order with updated dx?        Weight loss could be alternative diagnosis           ----- Message from Duarte Sauceda sent at 10/16/2024  3:06 PM CDT -----  Regarding: FW: Nutrition Referral  Is there another diagnosis we could use for the patient to get the nutritional referral?  ----- Message -----  From: Carmen Knapp  Sent: 10/16/2024   1:56 PM CDT  To: [facility] Staff  Subject: Nutrition Referral                               Good afternoon, I am attempting to schedule Mrs. Clark for a nutrition class, however the system is stating her insurance will not cover the class with the DX of Anemia. Does this patient have any other diagnosis that would warrant the nutrition consult?     Thank you,     Carmen Knapp  Access Navigator  [facility]   Referral Scheduling Department

## 2024-10-16 NOTE — TELEPHONE ENCOUNTER
That wont cover it either, its giving option off kidney disease, diabetes, and hypertensive chronic kidney disease

## 2024-10-17 ENCOUNTER — LAB VISIT (OUTPATIENT)
Dept: LAB | Facility: HOSPITAL | Age: 54
End: 2024-10-17
Attending: INTERNAL MEDICINE
Payer: MEDICARE

## 2024-10-17 ENCOUNTER — OFFICE VISIT (OUTPATIENT)
Dept: HEMATOLOGY/ONCOLOGY | Facility: CLINIC | Age: 54
End: 2024-10-17
Payer: MEDICARE

## 2024-10-17 VITALS
RESPIRATION RATE: 18 BRPM | DIASTOLIC BLOOD PRESSURE: 97 MMHG | HEIGHT: 65 IN | BODY MASS INDEX: 19.94 KG/M2 | SYSTOLIC BLOOD PRESSURE: 163 MMHG | OXYGEN SATURATION: 100 % | WEIGHT: 119.69 LBS | TEMPERATURE: 99 F | HEART RATE: 57 BPM

## 2024-10-17 DIAGNOSIS — D69.6 THROMBOCYTOPENIA: ICD-10-CM

## 2024-10-17 DIAGNOSIS — D69.3 CHRONIC IDIOPATHIC THROMBOCYTOPENIC PURPURA: ICD-10-CM

## 2024-10-17 DIAGNOSIS — D64.9 ANEMIA, UNSPECIFIED TYPE: Primary | ICD-10-CM

## 2024-10-17 DIAGNOSIS — D72.819 LEUKOPENIA, UNSPECIFIED TYPE: ICD-10-CM

## 2024-10-17 LAB
ALBUMIN SERPL-MCNC: 4.2 G/DL (ref 3.5–5)
ALBUMIN/GLOB SERPL: 1.3 RATIO (ref 1.1–2)
ALP SERPL-CCNC: 96 UNIT/L (ref 40–150)
ALT SERPL-CCNC: 37 UNIT/L (ref 0–55)
ANION GAP SERPL CALC-SCNC: 7 MEQ/L
AST SERPL-CCNC: 26 UNIT/L (ref 5–34)
BASOPHILS # BLD AUTO: 0.03 X10(3)/MCL
BASOPHILS NFR BLD AUTO: 0.8 %
BILIRUB SERPL-MCNC: 0.6 MG/DL
BUN SERPL-MCNC: 12.7 MG/DL (ref 9.8–20.1)
CALCIUM SERPL-MCNC: 9.7 MG/DL (ref 8.4–10.2)
CHLORIDE SERPL-SCNC: 109 MMOL/L (ref 98–107)
CO2 SERPL-SCNC: 24 MMOL/L (ref 22–29)
CREAT SERPL-MCNC: 1.01 MG/DL (ref 0.55–1.02)
CREAT/UREA NIT SERPL: 13
EOSINOPHIL # BLD AUTO: 0.02 X10(3)/MCL (ref 0–0.9)
EOSINOPHIL NFR BLD AUTO: 0.6 %
ERYTHROCYTE [DISTWIDTH] IN BLOOD BY AUTOMATED COUNT: 12.6 % (ref 11.5–17)
FERRITIN SERPL-MCNC: 188.89 NG/ML (ref 4.63–204)
GFR SERPLBLD CREATININE-BSD FMLA CKD-EPI: >60 ML/MIN/1.73/M2
GLOBULIN SER-MCNC: 3.2 GM/DL (ref 2.4–3.5)
GLUCOSE SERPL-MCNC: 96 MG/DL (ref 74–100)
HCT VFR BLD AUTO: 40.1 % (ref 37–47)
HGB BLD-MCNC: 12.6 G/DL (ref 12–16)
IMM GRANULOCYTES # BLD AUTO: 0.01 X10(3)/MCL (ref 0–0.04)
IMM GRANULOCYTES NFR BLD AUTO: 0.3 %
IRON SATN MFR SERPL: 22 % (ref 20–50)
IRON SERPL-MCNC: 71 UG/DL (ref 50–170)
LYMPHOCYTES # BLD AUTO: 1.13 X10(3)/MCL (ref 0.6–4.6)
LYMPHOCYTES NFR BLD AUTO: 31.9 %
MCH RBC QN AUTO: 31.2 PG (ref 27–31)
MCHC RBC AUTO-ENTMCNC: 31.4 G/DL (ref 33–36)
MCV RBC AUTO: 99.3 FL (ref 80–94)
MONOCYTES # BLD AUTO: 0.29 X10(3)/MCL (ref 0.1–1.3)
MONOCYTES NFR BLD AUTO: 8.2 %
NEUTROPHILS # BLD AUTO: 2.06 X10(3)/MCL (ref 2.1–9.2)
NEUTROPHILS NFR BLD AUTO: 58.2 %
PLATELET # BLD AUTO: 164 X10(3)/MCL (ref 130–400)
PMV BLD AUTO: 10.8 FL (ref 7.4–10.4)
POTASSIUM SERPL-SCNC: 4.1 MMOL/L (ref 3.5–5.1)
PROT SERPL-MCNC: 7.4 GM/DL (ref 6.4–8.3)
RBC # BLD AUTO: 4.04 X10(6)/MCL (ref 4.2–5.4)
SODIUM SERPL-SCNC: 140 MMOL/L (ref 136–145)
TIBC SERPL-MCNC: 246 UG/DL (ref 70–310)
TIBC SERPL-MCNC: 317 UG/DL (ref 250–450)
TRANSFERRIN SERPL-MCNC: 297 MG/DL (ref 180–382)
VIT B12 SERPL-MCNC: 601 PG/ML (ref 213–816)
WBC # BLD AUTO: 3.54 X10(3)/MCL (ref 4.5–11.5)

## 2024-10-17 PROCEDURE — 99214 OFFICE O/P EST MOD 30 MIN: CPT | Mod: PBBFAC | Performed by: NURSE PRACTITIONER

## 2024-10-17 PROCEDURE — 82607 VITAMIN B-12: CPT | Performed by: NURSE PRACTITIONER

## 2024-10-17 PROCEDURE — 82728 ASSAY OF FERRITIN: CPT | Performed by: NURSE PRACTITIONER

## 2024-10-17 PROCEDURE — 83540 ASSAY OF IRON: CPT | Performed by: NURSE PRACTITIONER

## 2024-10-17 PROCEDURE — 99999 PR PBB SHADOW E&M-EST. PATIENT-LVL IV: CPT | Mod: PBBFAC,,, | Performed by: NURSE PRACTITIONER

## 2024-10-17 PROCEDURE — 85025 COMPLETE CBC W/AUTO DIFF WBC: CPT

## 2024-10-17 PROCEDURE — 36415 COLL VENOUS BLD VENIPUNCTURE: CPT

## 2024-10-17 PROCEDURE — 80053 COMPREHEN METABOLIC PANEL: CPT

## 2024-10-17 PROCEDURE — 83550 IRON BINDING TEST: CPT | Performed by: NURSE PRACTITIONER

## 2024-10-17 PROCEDURE — 99215 OFFICE O/P EST HI 40 MIN: CPT | Mod: S$PBB,,, | Performed by: NURSE PRACTITIONER

## 2024-10-17 NOTE — PROGRESS NOTES
Subjective:       Patient ID: Aleyda Peacock is a 53 y.o. female.    Chief Complaint: No chief complaint on file.      Diagnosis:  Chronic ITP-- stable plts.    Dysregulated menstrual bleeding--much improved  Mild Leukopenia- chronic.   Crohns Disease--  managed per GI    Current Treatment:   Observation--secondary to stable platelet count since July 2016 (she has not required Nplate since that time)    Treatment History:  Steroids (prednisone)  Promacta 25 mg by mouth daily, mid-September 2016 x ~2-3 weeks. Discontinued secondary to loss of appetite generalized body aches UTI and leg pain.  N-plate given every other week.   5-7mcg/kg --started May 2012-->attempted to transition patient to Promacta in September 2016. She could not tolerate medication and after ~ 2-3 weeks was changed back N-plate.  N-plate to be held if platelet count greater than 150,000     HPI:  ALEYDA PEACOCK is a 53 years old female with a recurrent history of menorrhagia and polymenorrhea as well as recurrent to bruising. She has a chronic thrombocytopenia presumably secondary to chronic ITP. This was diagnosed more than 5 years ago. She was managed conservatively for the most part with well without significant bleeding or complications. As she had extensive workup in the breast that was largely unrevealing a part from a positive CMV antibody and the positive EBV antibody. She has been on steroids at 60 mg P.O. q. daily for several months and there was gradually tapered down to 20 mg a day. She had an initial response to steroids but later on her clinic chart went down to the range between 40-70. She gives a strong family history of that her sister and her mom have very similar symptoms with the thrombocytopenia easy bruising and menorrhagia. She had workup for von Willebrand disease at twice and was negative. There is a possibility of a rare familial thrombocytopenia but in her prior bone marrow which she had twice in 2007 and 2009  cytogenetics showed a normal female chromosomes 46xx. she has been seeing Dr. Mendoza at the [facility] a more over the past couple years and now she presents to us for further evaluation and management.      BM aspiration/Biopsy 2007 showed normocellular marrow with 50% cellularity. no significant dysplasia. megakaryocytes are present and mature. no evidence of an abnormal infiltrate. FLOW: failed to show any abnormal cell population.  Cytogenetics: 46 XX.  Repeat on BM aspiration/biopsy 2009 showed active erythroid and myelopoiesis, peripheral consumption or sequestration should be considered.       Started on Nplate 5/7/2012 with initially good response, which then leveled off at a dose of 1 mcg per kilogram. She was subsequently titrated upwards in her dosing and is now at 4 mcg per kilogram every other week with good response.    She had a drop in platelets and dose was increased to 5 mcg per kilogram every 2 weeks.  Her dose was then increased to 7 mcg per kilogram every 2 weeks on 10/21/2013     Patient underwent bone marrow biopsy and aspirate on June 13, 2016 out of concern/surveillance while on N-plate to ensure no transformation to myelodysplastic syndrome. Bone marrow biopsy was done without event and showed normocellular hematopoiesis with no evidence of myelodysplasia or dysmorphic megakaryocytes.  In mid September 2016, we attempted to transition patient to Promacta. She could not tolerate medication and after ~ 2-3 weeks was changed back N-plate.  She last received Nplate in July 2016 with no further dosing required secondary to platelet counts being greater than 200,000 consistently     Interval History:   Patient is here today for a follow-up for chronic ITP.  From a hematology standpoint, she is doing well.  She states that in July she had very swollen legs with bruising in which she presented to multiple emergency rooms.  She did have a hospitalization here in September in which she  presented with an acute anemia, hemoglobin of 6.5 and did require blood transfusions.  She denies any bleeding.  She states that she has had recent scopes with GI since July that were negative for sources of bleeding.  These results are not available to me.  She is now established with a PCP but would like a different one.   .   Past Medical History:   Diagnosis Date    Chronic ITP (idiopathic thrombocytopenia)     Crohn's disease     GERD (gastroesophageal reflux disease)     Hypertension     Leukopenia     Psychosomatic disorder     Severe anxiety       Past Surgical History:   Procedure Laterality Date    BONE MARROW BIOPSY  06/13/2016    DILATION AND CURETTAGE OF UTERUS  10/31/2018    PARTIAL HYSTERECTOMY       Social History     Socioeconomic History    Marital status: Single   Tobacco Use    Smoking status: Never    Smokeless tobacco: Never   Substance and Sexual Activity    Alcohol use: Never    Drug use: Never     Social Drivers of Health     Financial Resource Strain: Low Risk  (9/26/2024)    Overall Financial Resource Strain (CARDIA)     Difficulty of Paying Living Expenses: Not hard at all   Food Insecurity: No Food Insecurity (9/26/2024)    Hunger Vital Sign     Worried About Running Out of Food in the Last Year: Never true     Ran Out of Food in the Last Year: Never true   Transportation Needs: No Transportation Needs (9/26/2024)    TRANSPORTATION NEEDS     Transportation : No   Physical Activity: Sufficiently Active (9/26/2024)    Exercise Vital Sign     Days of Exercise per Week: 4 days     Minutes of Exercise per Session: 40 min   Stress: No Stress Concern Present (9/26/2024)    [employer] [place] [employer] - Occupational Stress Questionnaire     Feeling of Stress : Not at all   Housing Stability: Low Risk  (9/26/2024)    Housing Stability Vital Sign     Unable to Pay for Housing in the Last Year: No     Homeless in the Last Year: No      Family History   Problem Relation Name Age of  Onset    Diabetes Mother      Hypertension Mother      Heart attack Mother      Heart disease Mother      Diabetes Father      Brain cancer Father      ALYSE disease Father      Heart disease Sister      Diabetes Brother        Review of patient's allergies indicates:   Allergen Reactions    Ketorolac      Other reaction(s): SOB, Stops breathing    Prednisone Shortness Of Breath     Other reaction(s): SOB    Amoxicillin-pot clavulanate      Other reaction(s): Hives, RASH    Azithromycin      Other reaction(s): RASH    Ciprofloxacin      Other reaction(s): Hives, RASH    Clindamycin      Other reaction(s): Rash    Diphenoxylate-atropine      Other reaction(s): Hives, RASH    Hydrocodone-acetaminophen      Hives, swelling    Iodine      Other reaction(s): Difficulty breathing, Hives, SOB, Swelling    Penicillin      Other reaction(s): Hives, RASH    Shellfish containing products      Other reaction(s): Hives, RASH    Sulfamethoxazole-trimethoprim      Other reaction(s): Hives, RASH    Atropine      Hives    Cefuroxime      Hives    Clonazepam Hives    Diphenoxylate      Hives    Doxycycline     Famotidine      Other Reaction(s): Unknown    Hydrocodone      Hives    Povidone-iodine      Other Reaction(s): Unknown    Sulfamethoxazole      Hives    Trimethoprim      Hives    Zoloft [sertraline] Other (See Comments)     Serotonin syndrome       Review of Systems   Constitutional:  Negative for activity change, appetite change, chills, diaphoresis, fatigue, fever and unexpected weight change.   Eyes:  Negative for visual disturbance.   Respiratory:  Negative for shortness of breath.    Cardiovascular:  Negative for chest pain and leg swelling.   Gastrointestinal:  Negative for abdominal pain, anal bleeding, blood in stool, change in bowel habit, constipation, diarrhea, nausea and vomiting.   Genitourinary:  Negative for hematuria, menstrual problem and vaginal bleeding.   Allergic/Immunologic: Negative for immunocompromised  state.   Neurological:  Positive for headaches.         Objective:      Physical Exam  Vitals reviewed.   Constitutional:       General: She is awake. She is not in acute distress.     Appearance: Normal appearance. She is well-groomed.   HENT:      Head: Normocephalic.   Eyes:      General: Lids are normal. No scleral icterus.     Extraocular Movements: Extraocular movements intact.      Conjunctiva/sclera: Conjunctivae normal.   Cardiovascular:      Rate and Rhythm: Normal rate and regular rhythm.   Pulmonary:      Effort: Pulmonary effort is normal.      Breath sounds: Normal breath sounds.   Chest:      Chest wall: No tenderness.   Musculoskeletal:         General: No swelling, tenderness or deformity.      Cervical back: Full passive range of motion without pain and neck supple. No tenderness.      Right lower leg: No edema.      Left lower leg: No edema.   Skin:     General: Skin is warm and dry.      Coloration: Skin is not jaundiced or pale.   Neurological:      General: No focal deficit present.      Mental Status: She is alert and oriented to person, place, and time.      Motor: No weakness.      Gait: Gait is intact.   Psychiatric:         Speech: Speech is rapid and pressured.         Behavior: Behavior is agitated.         LABS REVIEWED IN [facility]          Assessment:   Chronic ITP-- stable, off of N-plate and on observation  Chronic mild Leukopenia-- stable  Crohn's disease-- managed per GI  Weightloss/anorexia-- managed per GI      Plan:       From a platelet standpoint she is doing very well.  Platelet count today is 164,000. Her recent anemia has resolved and WBC count is stable.     Continue to follow up with Immunologist as scheduled     Continue to follow up with Dermatologist as scheduled    Continue follow-up with GI.    Discuss possible bone marrow biopsy due to her decreased white blood cell count, she declines at this time.    Her biggest concern is seeing a dietitian and getting nutritional  supplements that she can tolerate.  Will refer to outpatient nutrition services      RTC in 3 months for OV and clinical examination     Labs: CBC, CMP     All questions were answered to the best of my ability.  I spent greater than 45 minutes face-to-face with the patient discussing her blood counts, blood count history including those from her hospitalization in September and clarifying recent lab work since then.  We will add iron studies and B12 to today's labs, offered to repeat bone marrow biopsy as she has concerns about her recent anemia that has now resolved.  She continues to decline a bone marrow biopsy.    Asha Cruz, FNP-C  Oncology/Hematology  [facility]

## 2024-10-22 ENCOUNTER — TELEPHONE (OUTPATIENT)
Dept: INTERNAL MEDICINE | Facility: CLINIC | Age: 54
End: 2024-10-22
Payer: MEDICARE

## 2024-10-22 NOTE — TELEPHONE ENCOUNTER
----- Message from Apryl sent at 10/22/2024 10:13 AM CDT -----  .Type:  Patient Returning Call    Who Called:pt  Who Left Message for Patient:pt  Does the patient know what this is regarding?:dietitian  Would the patient rather a call back or a response via [facility]? MANOJ  Best Call Back Number:[phone]  Additional Information: Please call back about her getting information about dietitian. Insurance will cover with Dr signing that it is medically necessary

## 2024-11-11 ENCOUNTER — TELEPHONE (OUTPATIENT)
Dept: INTERNAL MEDICINE | Facility: CLINIC | Age: 54
End: 2024-11-11
Payer: MEDICARE

## 2024-11-11 NOTE — TELEPHONE ENCOUNTER
----- Message from Rocketboom sent at 11/11/2024  8:56 AM CST -----  .Who Called: Amber Martínez    What order is the patient requesting: MRI in groin area (Bi lateral) W/o contrast   When does the expect the orders to be performed? Today         Preferred Method of Contact: Phone Call  Patient's Preferred Phone Number on File: [phone]   Best Call Back Number, if different:  Additional Information: Pt stated she has been attending PT the treatment has been working well for her feet but  her groin area has been hurting/tender to the touch. Pt stated that she was advised that the area may be infected due to pain/ tenderness of the area and they were concerned. She was recommended  to [facility] [place]. Pt would like to speak with the provider. Fax:[phone]  Phone:[phone]

## 2024-11-13 ENCOUNTER — TELEPHONE (OUTPATIENT)
Dept: INTERNAL MEDICINE | Facility: CLINIC | Age: 54
End: 2024-11-13
Payer: MEDICARE

## 2024-11-13 NOTE — TELEPHONE ENCOUNTER
----- Message from [place] sent at 11/13/2024  9:56 AM CST -----  Regarding: appt  Pt is asking if she should do labs prior to her appt tomorrow (she's [place] in dec ,date doc is on vac?  ----- Message -----  From: Komal Feldman MA  Sent: 11/13/2024   9:33 AM CST  To: [place] Henning  Subject: RE: returning call                                 ----- Message -----  From: Angeles Morales  Sent: 11/13/2024   8:35 AM CST  To: [facility] Staff  Subject: returning call                                   .Who Called: Amber Martínez    Patient is returning phone call    Who Left Message for Patient:unknown  Does the patient know what this is regarding?:unknown      Preferred Method of Contact: Phone Call  Patient's Preferred Phone Number on File: [phone]   Best Call Back Number, if different:  Additional Information: pt returning call [facility] no answer

## 2024-11-13 NOTE — TELEPHONE ENCOUNTER
No need for labs for tomorrow, appointment in Dec needs to be rescheduled and she will do labs then

## 2024-11-14 ENCOUNTER — OFFICE VISIT (OUTPATIENT)
Dept: INTERNAL MEDICINE | Facility: CLINIC | Age: 54
End: 2024-11-14
Payer: MEDICARE

## 2024-11-14 VITALS
HEART RATE: 61 BPM | HEIGHT: 65 IN | DIASTOLIC BLOOD PRESSURE: 82 MMHG | OXYGEN SATURATION: 99 % | BODY MASS INDEX: 21.16 KG/M2 | SYSTOLIC BLOOD PRESSURE: 138 MMHG | WEIGHT: 127 LBS

## 2024-11-14 DIAGNOSIS — R10.32 BILATERAL GROIN PAIN: Primary | ICD-10-CM

## 2024-11-14 DIAGNOSIS — R10.31 BILATERAL GROIN PAIN: Primary | ICD-10-CM

## 2024-11-14 NOTE — ASSESSMENT & PLAN NOTE
Will get CT pelvis to further evaluate  Continue PT  Okay to continue OTC Tylenol as needed per package instructions.

## 2024-11-14 NOTE — PROGRESS NOTES
Internal Medicine    Patient Name:  Amber Martínez   Patient ID: [id number]     Chief Complaint: Groin Pain      HPI:     Amber Martínez is a 53 y.o. female, known to Dr Hamlin, is here today for requested visit.  Medical comorbidities include chronic ITP, HTN, GERD, Crohn's disease, leukopenia, psychosomatic disorder.  Patient reports bilateral hip/groin pain has been ongoing for the past several months.  She had evaluation in July 2024 with imaging.  Pelvic x-rays showed arthritic changes of bilateral hips, right worse than left.  CT pelvis with no acute findings.  At that time, she was reporting difficulty walking.  She was referred to physical therapy for further evaluation.  She reports since starting physical therapy, she has had improvement in ambulation and pain.  However, over the past month she reports worsening in bilateral groin pain.      Last AWV: 9/3/24 ([facility])      Past Medical History:   Diagnosis Date    Acute tonsillitis due to other specified organisms 11/15/2024    Chronic ITP (idiopathic thrombocytopenia)     Crohn's disease     Encounter for screening colonoscopy 11/15/2024    GERD (gastroesophageal reflux disease)     Hypertension     Left lower quadrant pain 11/15/2024    Leukopenia     Psychosomatic disorder     Severe anxiety         Past Surgical History:   Procedure Laterality Date    BONE MARROW BIOPSY  06/13/2016    DILATION AND CURETTAGE OF UTERUS  10/31/2018    PARTIAL HYSTERECTOMY          Social History     Tobacco Use    Smoking status: Never    Smokeless tobacco: Never   Substance and Sexual Activity    Alcohol use: Never    Drug use: Never    Sexual activity: Not on file        Current Outpatient Medications   Medication Instructions    amLODIPine (NORVASC) 5 mg, Oral, Daily    clonazePAM (KLONOPIN) 0.5 mg, 2 times daily    estradioL (VAGIFEM) 10 mcg Tab 1 tablet, Twice weekly    pantoprazole (PROTONIX) 40 mg, Oral, Daily       Review of patient's allergies indicates:    Allergen Reactions    Ketorolac      Other reaction(s): SOB, Stops breathing    Prednisone Shortness Of Breath     Other reaction(s): SOB    Amoxicillin-pot clavulanate      Other reaction(s): Hives, RASH    Azithromycin      Other reaction(s): RASH    Ciprofloxacin      Other reaction(s): Hives, RASH    Clindamycin      Other reaction(s): Rash    Diphenoxylate-atropine      Other reaction(s): Hives, RASH    Hydrocodone-acetaminophen      Hives, swelling    Iodine      Other reaction(s): Difficulty breathing, Hives, SOB, Swelling    Penicillin      Other reaction(s): Hives, RASH    Shellfish containing products      Other reaction(s): Hives, RASH    Sulfamethoxazole-trimethoprim      Other reaction(s): Hives, RASH    Atropine      Hives    Cefuroxime      Hives    Clonazepam Hives    Diphenoxylate      Hives    Doxycycline     Famotidine      Other Reaction(s): Unknown    Hydrocodone      Hives    Povidone-iodine      Other Reaction(s): Unknown    Sulfamethoxazole      Hives    Trimethoprim      Hives    Zoloft [sertraline] Other (See Comments)     Serotonin syndrome         Patient Care Team:  Elle Marc MD as PCP - General (Internal Medicine)  Mike Tuttle MD as Consulting Physician (Family Medicine)     Subjective:     Review of Systems   Constitutional:  Negative for appetite change, chills, diaphoresis and fever.   HENT:  Negative for congestion, ear pain, sinus pain and sore throat.    Eyes:  Negative for pain and visual disturbance.   Respiratory:  Negative for cough, shortness of breath and wheezing.    Cardiovascular:  Negative for chest pain, palpitations and leg swelling.   Gastrointestinal:  Negative for abdominal pain, constipation, diarrhea, nausea and vomiting.   Endocrine: Negative for cold intolerance.   Genitourinary:  Negative for difficulty urinating, dysuria, frequency and hematuria.   Musculoskeletal:  Negative for arthralgias and myalgias.        Bilateral groin pain  "  Skin:  Negative for color change and rash.   Allergic/Immunologic: Negative.    Neurological: Negative.  Negative for dizziness, syncope, light-headedness and numbness.   Hematological: Negative.    Psychiatric/Behavioral: Negative.  Negative for dysphoric mood. The patient is not nervous/anxious.    All other systems reviewed and are negative.      Objective:     Visit Vitals  /82 (BP Location: Left arm, Patient Position: Sitting)   Pulse 61   Ht 5' 5" (1.651 m)   Wt 57.6 kg (127 lb)   SpO2 99%   BMI 21.13 kg/m²       Physical Exam  Vitals and nursing note reviewed.   Constitutional:       General: She is not in acute distress.     Appearance: She is not ill-appearing.   HENT:      Head: Normocephalic and atraumatic.      Mouth/Throat:      Mouth: Mucous membranes are moist.      Pharynx: Oropharynx is clear.   Eyes:      General: No scleral icterus.     Extraocular Movements: Extraocular movements intact.      Conjunctiva/sclera: Conjunctivae normal.      Pupils: Pupils are equal, round, and reactive to light.   Neck:      Vascular: No carotid bruit.   Cardiovascular:      Rate and Rhythm: Normal rate and regular rhythm.      Heart sounds: Normal heart sounds. No murmur heard.     No friction rub. No gallop.   Pulmonary:      Effort: Pulmonary effort is normal. No respiratory distress.      Breath sounds: Normal breath sounds. No wheezing, rhonchi or rales.   Abdominal:      General: Bowel sounds are normal. There is no distension.      Palpations: Abdomen is soft. There is no mass.      Tenderness: There is no abdominal tenderness.   Musculoskeletal:         General: Normal range of motion.      Cervical back: Normal range of motion and neck supple.      Comments: Bilateral groin pain, worsened with leg extension   Lymphadenopathy:      Cervical: No cervical adenopathy.      Upper Body:      Right upper body: No supraclavicular adenopathy.      Left upper body: No supraclavicular adenopathy.      Lower " Body: No right inguinal adenopathy. No left inguinal adenopathy.   Skin:     General: Skin is warm and dry.      Capillary Refill: Capillary refill takes less than 2 seconds.   Neurological:      General: No focal deficit present.      Mental Status: She is alert and oriented to person, place, and time.   Psychiatric:         Mood and Affect: Mood normal.         Behavior: Behavior normal.         Labs Reviewed:     Chemistry:  Lab Results   Component Value Date     10/17/2024    K 4.1 10/17/2024    BUN 12.7 10/17/2024    CREATININE 1.01 10/17/2024    EGFRNORACEVR >60 10/17/2024    GLUCOSE 96 10/17/2024    CALCIUM 9.7 10/17/2024    ALKPHOS 96 10/17/2024    LABPROT 7.4 10/17/2024    ALBUMIN 4.2 10/17/2024    BILIDIR 0.5 03/19/2022    IBILI 0.90 03/19/2022    AST 26 10/17/2024    ALT 37 10/17/2024    MG 2.40 03/14/2022    TSH 0.7916 03/19/2022    ZNTOKH9MSUK 1.39 03/14/2022        Hematology:  Lab Results   Component Value Date    WBC 3.54 (L) 10/17/2024    HGB 12.6 10/17/2024    HCT 40.1 10/17/2024     10/17/2024       Urine:  Lab Results   Component Value Date    APPEARANCEUA Clear 07/22/2024    SGUA <=1.005 07/22/2024    PROTEINUA Negative 07/22/2024    KETONESUA Negative 07/22/2024    LEUKOCYTESUR Negative 07/22/2024    RBCUA 0-2 07/22/2024    WBCUA 0-2 07/22/2024    BACTERIA Moderate (A) 07/22/2024    SQEPUA Few (A) 07/11/2024        Assessment:       ICD-10-CM ICD-9-CM   1. Bilateral groin pain  R10.31 789.03    R10.32 789.04        Plan:     1. Bilateral groin pain  Assessment & Plan:  Will get CT pelvis to further evaluate  Continue PT  Okay to continue OTC Tylenol as needed per package instructions.    Orders:  -     CT Pelvis Without Contrast; Future; Expected date: 11/14/2024       Discussed above plan with Dr. Hamlin.  No additional recommendations at that time. Will follow up with CT results.    Follow up for Previously scheduled and PRN if need. In addition to their scheduled follow up, the  patient has also been instructed to follow up on as needed basis.       Future Appointments   Date Time Provider Department Center   1/14/2025  9:00 AM LAB, [facility] LAB [facility]   1/14/2025  9:30 AM Asha Cruz FNP [facility] HEMON[facility]   1/16/2025  1:00 PM Elle Marc MD [facility] [id number]          Sveta Cleveland P

## 2024-11-15 PROBLEM — F41.9 ANXIETY: Status: ACTIVE | Noted: 2024-11-15

## 2024-11-15 PROBLEM — Z12.11 ENCOUNTER FOR SCREENING COLONOSCOPY: Status: RESOLVED | Noted: 2024-11-15 | Resolved: 2024-11-15

## 2024-11-15 PROBLEM — J03.80 ACUTE TONSILLITIS DUE TO OTHER SPECIFIED ORGANISMS: Status: RESOLVED | Noted: 2024-11-15 | Resolved: 2024-11-15

## 2024-11-15 PROBLEM — R10.32 LEFT LOWER QUADRANT PAIN: Status: RESOLVED | Noted: 2024-11-15 | Resolved: 2024-11-15

## 2024-11-15 PROBLEM — E73.9 LACTOSE INTOLERANCE: Status: ACTIVE | Noted: 2024-11-15

## 2024-11-15 PROBLEM — K52.89 OTHER SPECIFIED NONINFECTIVE GASTROENTERITIS AND COLITIS: Status: RESOLVED | Noted: 2024-11-15 | Resolved: 2024-11-15

## 2024-11-15 PROBLEM — N39.0 RECURRENT URINARY TRACT INFECTION: Status: RESOLVED | Noted: 2023-06-27 | Resolved: 2024-11-15

## 2024-11-15 PROBLEM — R10.13 DYSPEPSIA: Status: ACTIVE | Noted: 2024-11-15

## 2024-11-15 PROBLEM — R13.10 DYSPHAGIA: Status: ACTIVE | Noted: 2024-11-15

## 2024-11-18 ENCOUNTER — TELEPHONE (OUTPATIENT)
Dept: INTERNAL MEDICINE | Facility: CLINIC | Age: 54
End: 2024-11-18
Payer: MEDICARE

## 2024-11-18 NOTE — TELEPHONE ENCOUNTER
----- Message from [employer] sent at 11/15/2024 12:20 PM CST -----  .Who Called: Amber Martínez    Caller is requesting assistance/information from provider's office.    Symptoms (please be specific): n/a   How long has patient had these symptoms:  n/a  List of preferred pharmacies on file (remove unneeded): [unfilled]  If different, enter pharmacy into here including location and phone number: n/a      Preferred Method of Contact: Phone Call  Patient's Preferred Phone Number on File: [phone]   Best Call Back Number, if different:  Additional Information: Pt is following up to advise that the facility [place] orders for have changed he fax number and advised the pt that they don't have any orders for her. The pt asked if their office can be called to get the updated information to refax the orders. [phone]

## 2024-12-11 ENCOUNTER — OFFICE VISIT (OUTPATIENT)
Dept: INTERNAL MEDICINE | Facility: CLINIC | Age: 54
End: 2024-12-11
Payer: MEDICARE

## 2024-12-11 ENCOUNTER — TELEPHONE (OUTPATIENT)
Dept: INTERNAL MEDICINE | Facility: CLINIC | Age: 54
End: 2024-12-11

## 2024-12-11 ENCOUNTER — LAB VISIT (OUTPATIENT)
Dept: LAB | Facility: HOSPITAL | Age: 54
End: 2024-12-11
Attending: STUDENT IN AN ORGANIZED HEALTH CARE EDUCATION/TRAINING PROGRAM
Payer: MEDICARE

## 2024-12-11 VITALS
SYSTOLIC BLOOD PRESSURE: 146 MMHG | HEIGHT: 65 IN | WEIGHT: 118 LBS | TEMPERATURE: 98 F | OXYGEN SATURATION: 100 % | DIASTOLIC BLOOD PRESSURE: 83 MMHG | HEART RATE: 69 BPM | BODY MASS INDEX: 19.66 KG/M2

## 2024-12-11 DIAGNOSIS — R50.9 FEVER, UNSPECIFIED FEVER CAUSE: ICD-10-CM

## 2024-12-11 DIAGNOSIS — K08.89 TOOTH PAIN: Primary | ICD-10-CM

## 2024-12-11 DIAGNOSIS — I10 HYPERTENSION, UNSPECIFIED TYPE: ICD-10-CM

## 2024-12-11 LAB
ALBUMIN SERPL-MCNC: 4.4 G/DL (ref 3.5–5)
ALBUMIN/GLOB SERPL: 1.4 RATIO (ref 1.1–2)
ALP SERPL-CCNC: 90 UNIT/L (ref 40–150)
ALT SERPL-CCNC: 69 UNIT/L (ref 0–55)
ANION GAP SERPL CALC-SCNC: 6 MEQ/L
AST SERPL-CCNC: 38 UNIT/L (ref 5–34)
BASOPHILS # BLD AUTO: 0.02 X10(3)/MCL
BASOPHILS NFR BLD AUTO: 0.6 %
BILIRUB SERPL-MCNC: 0.6 MG/DL
BUN SERPL-MCNC: 7.5 MG/DL (ref 9.8–20.1)
CALCIUM SERPL-MCNC: 9.8 MG/DL (ref 8.4–10.2)
CHLORIDE SERPL-SCNC: 107 MMOL/L (ref 98–107)
CO2 SERPL-SCNC: 27 MMOL/L (ref 22–29)
CREAT SERPL-MCNC: 1.04 MG/DL (ref 0.55–1.02)
CREAT/UREA NIT SERPL: 7
EOSINOPHIL # BLD AUTO: 0.02 X10(3)/MCL (ref 0–0.9)
EOSINOPHIL NFR BLD AUTO: 0.6 %
ERYTHROCYTE [DISTWIDTH] IN BLOOD BY AUTOMATED COUNT: 11.9 % (ref 11.5–17)
GFR SERPLBLD CREATININE-BSD FMLA CKD-EPI: >60 ML/MIN/1.73/M2
GLOBULIN SER-MCNC: 3.1 GM/DL (ref 2.4–3.5)
GLUCOSE SERPL-MCNC: 103 MG/DL (ref 74–100)
HCT VFR BLD AUTO: 42.4 % (ref 37–47)
HGB BLD-MCNC: 13.3 G/DL (ref 12–16)
IMM GRANULOCYTES # BLD AUTO: 0.01 X10(3)/MCL (ref 0–0.04)
IMM GRANULOCYTES NFR BLD AUTO: 0.3 %
LYMPHOCYTES # BLD AUTO: 1.08 X10(3)/MCL (ref 0.6–4.6)
LYMPHOCYTES NFR BLD AUTO: 34.7 %
MCH RBC QN AUTO: 30.1 PG (ref 27–31)
MCHC RBC AUTO-ENTMCNC: 31.4 G/DL (ref 33–36)
MCV RBC AUTO: 95.9 FL (ref 80–94)
MONOCYTES # BLD AUTO: 0.25 X10(3)/MCL (ref 0.1–1.3)
MONOCYTES NFR BLD AUTO: 8 %
NEUTROPHILS # BLD AUTO: 1.73 X10(3)/MCL (ref 2.1–9.2)
NEUTROPHILS NFR BLD AUTO: 55.8 %
NRBC BLD AUTO-RTO: 0 %
PLATELET # BLD AUTO: 154 X10(3)/MCL (ref 130–400)
PMV BLD AUTO: 11.2 FL (ref 7.4–10.4)
POTASSIUM SERPL-SCNC: 4.2 MMOL/L (ref 3.5–5.1)
PROT SERPL-MCNC: 7.5 GM/DL (ref 6.4–8.3)
RBC # BLD AUTO: 4.42 X10(6)/MCL (ref 4.2–5.4)
SODIUM SERPL-SCNC: 140 MMOL/L (ref 136–145)
WBC # BLD AUTO: 3.11 X10(3)/MCL (ref 4.5–11.5)

## 2024-12-11 PROCEDURE — 85025 COMPLETE CBC W/AUTO DIFF WBC: CPT

## 2024-12-11 PROCEDURE — 80053 COMPREHEN METABOLIC PANEL: CPT

## 2024-12-11 PROCEDURE — 99214 OFFICE O/P EST MOD 30 MIN: CPT | Mod: ,,, | Performed by: STUDENT IN AN ORGANIZED HEALTH CARE EDUCATION/TRAINING PROGRAM

## 2024-12-11 PROCEDURE — 36415 COLL VENOUS BLD VENIPUNCTURE: CPT

## 2024-12-11 RX ORDER — CLINDAMYCIN HYDROCHLORIDE 300 MG/1
300 CAPSULE ORAL EVERY 8 HOURS
Qty: 21 CAPSULE | Refills: 0 | Status: SHIPPED | OUTPATIENT
Start: 2024-12-11

## 2024-12-11 RX ORDER — DEXTROMETHORPHAN HYDROBROMIDE, GUAIFENESIN 5; 100 MG/5ML; MG/5ML
650 LIQUID ORAL EVERY 8 HOURS
COMMUNITY

## 2024-12-11 RX ORDER — EPINEPHRINE 0.3 MG/.3ML
1 INJECTION SUBCUTANEOUS ONCE
Qty: 0.3 ML | Refills: 0 | Status: SHIPPED | OUTPATIENT
Start: 2024-12-11 | End: 2024-12-11

## 2024-12-11 NOTE — TELEPHONE ENCOUNTER
----- Message from [employer] sent at 12/11/2024  2:23 PM CST -----  .Who Called: Amber Martínez    Caller is requesting information on test results.    Name of Test (Lab/Mammo/Etc): Labs    Date of Test:  12/11/2024    Where the test was performed: [facility]    Ordering Provider:  Dr. Hamlin    Preferred Method of Contact: Phone Call    Patient's Preferred Phone Number on File: [phone]     Best Call Back Number, if different:    Additional Information: Called stating she received message her results are in but its not allowing her to log in. Pt is requesting a cb with results. Please advise, thank you.

## 2024-12-11 NOTE — TELEPHONE ENCOUNTER
----- Message from [employer] sent at 12/11/2024  7:10 AM CST -----  Regarding: Sooner appt  Who Called: Amber Martínez    Caller is requesting assistance/information from provider's office.    Symptoms (please be specific):    How long has patient had these symptoms:    List of preferred pharmacies on file (remove unneeded): [unfilled]  If different, enter pharmacy into here including location and phone number:         Patient's Preferred Phone Number on File: [phone]   Best Call Back Number, if different:    Additional Information: pt has recent dental work (filling on Monday) and is now feeling fatigue and weakness , pt is anemic and believes she has an infection and fluctuating BP   , pt was directed by dentist to sched appt to determine if she has an infection. Pt stated she only wants to see Dr Hamlin today, please follow up .

## 2024-12-12 NOTE — TELEPHONE ENCOUNTER
----- Message from [employer] sent at 12/12/2024  2:36 PM CST -----  .Who Called: Amber Martínez        Preferred Method of Contact: Phone Call  Patient's Preferred Phone Number on File: [phone]   Best Call Back Number, if different:  Additional Information: Deandra mirza calling back today check her results again please advice

## 2024-12-23 ENCOUNTER — TELEPHONE (OUTPATIENT)
Dept: INTERNAL MEDICINE | Facility: CLINIC | Age: 54
End: 2024-12-23
Payer: MEDICARE

## 2024-12-23 PROBLEM — K08.89 TOOTH PAIN: Status: ACTIVE | Noted: 2024-12-23

## 2024-12-23 NOTE — PROGRESS NOTES
Subjective:      Amber Martínez  12/23/2024  [id number]      Chief Complaint: Hypertension and Fever       HPI:  Ms Clark presents for blood pressure follow up. Blood pressure is elevated today, currently on amlodipine. Patient is complaining of tooth pain and swelling of gum, will go see a dentist.     Past Medical History:   Diagnosis Date    Acute tonsillitis due to other specified organisms 11/15/2024    Chronic ITP (idiopathic thrombocytopenia)     Crohn's disease     Encounter for screening colonoscopy 11/15/2024    GERD (gastroesophageal reflux disease)     Hypertension     Left lower quadrant pain 11/15/2024    Leukopenia     Psychosomatic disorder     Severe anxiety      Past Surgical History:   Procedure Laterality Date    BONE MARROW BIOPSY  06/13/2016    DILATION AND CURETTAGE OF UTERUS  10/31/2018    PARTIAL HYSTERECTOMY       Family History   Problem Relation Name Age of Onset    Diabetes Mother      Hypertension Mother      Heart attack Mother      Heart disease Mother      Diabetes Father      Brain cancer Father      ALYSE disease Father      Heart disease Sister      Diabetes Brother       Social History     Tobacco Use    Smoking status: Never    Smokeless tobacco: Never   Substance and Sexual Activity    Alcohol use: Never    Drug use: Never    Sexual activity: Not on file     Review of patient's allergies indicates:   Allergen Reactions    Ketorolac      Other reaction(s): SOB, Stops breathing    Prednisone Shortness Of Breath     Other reaction(s): SOB    Amoxicillin-pot clavulanate      Other reaction(s): Hives, RASH    Azithromycin      Other reaction(s): RASH    Ciprofloxacin      Other reaction(s): Hives, RASH    Clindamycin      Other reaction(s): Rash    Diphenoxylate-atropine      Other reaction(s): Hives, RASH    Hydrocodone-acetaminophen      Hives, swelling    Iodine      Other reaction(s): Difficulty breathing, Hives, SOB, Swelling    Penicillin      Other reaction(s): Hives, RASH     Shellfish containing products      Other reaction(s): Hives, RASH    Sulfamethoxazole-trimethoprim      Other reaction(s): Hives, RASH    Atropine      Hives    Cefuroxime      Hives    Clonazepam Hives    Diphenoxylate      Hives    Doxycycline     Famotidine      Other Reaction(s): Unknown    Hydrocodone      Hives    Povidone-iodine      Other Reaction(s): Unknown    Sulfamethoxazole      Hives    Trimethoprim      Hives    Zoloft [sertraline] Other (See Comments)     Serotonin syndrome        The following were reviewed at this visit: active problem list, medication list, allergies, family history, social history, and health maintenance.    Medications:    Current Outpatient Medications:     acetaminophen (TYLENOL) 650 MG TbSR, Take 650 mg by mouth every 8 (eight) hours., Disp: , Rfl:     amLODIPine (NORVASC) 5 MG tablet, Take 1 tablet (5 mg total) by mouth once daily., Disp: 30 tablet, Rfl: 3    clonazePAM (KLONOPIN) 0.5 MG tablet, Take 0.5 mg by mouth 2 (two) times daily. BRAND NAME ONLY Second dose not taken on Tuesdays and Fridays, Disp: , Rfl:     estradioL (VAGIFEM) 10 mcg Tab, Place 1 tablet vaginally twice a week. Tuesdays and Friday at 9:00 pm, Disp: , Rfl:     pantoprazole (PROTONIX) 40 MG tablet, Take 1 tablet (40 mg total) by mouth once daily., Disp: 30 tablet, Rfl: 0    clindamycin (CLEOCIN) 300 MG capsule, Take 1 capsule (300 mg total) by mouth every 8 (eight) hours., Disp: 21 capsule, Rfl: 0    EPINEPHrine (EPIPEN) 0.3 mg/0.3 mL AtIn, Inject 0.3 mLs (0.3 mg total) into the muscle once. for 1 dose, Disp: 0.3 mL, Rfl: 0      Medications have been reviewed and reconciled with patient at this visit.  Barriers to medications reviewed with patient.    Adverse reactions to current medications reviewed with patient..    Over the counter medications reviewed and reconciled with patient.  Review of Systems   Constitutional:  Negative for chills, fever, malaise/fatigue and weight loss.   HENT:  Negative  "for congestion, ear discharge, ear pain, hearing loss, sinus pain and sore throat.    Eyes:  Negative for photophobia, pain, discharge and redness.   Respiratory:  Negative for cough, shortness of breath and wheezing.    Cardiovascular:  Negative for chest pain, palpitations and leg swelling.   Gastrointestinal:  Negative for constipation, diarrhea, heartburn, nausea and vomiting.   Genitourinary:  Negative for dysuria, frequency and urgency.   Musculoskeletal:  Negative for falls, joint pain and myalgias.   Skin:  Negative for itching and rash.   Neurological:  Negative for dizziness, focal weakness, weakness and headaches.   Psychiatric/Behavioral:  Negative for depression and memory loss. The patient is not nervous/anxious and does not have insomnia.            Objective:      Vitals:    12/11/24 1145   BP: (!) 146/83   BP Location: Right arm   Patient Position: Sitting   Pulse: 69   Temp: 98 °F (36.7 °C)   SpO2: 100%   Weight: 53.5 kg (118 lb)   Height: 5' 5" (1.651 m)       Physical Exam  Constitutional:       General: She is not in acute distress.     Appearance: Normal appearance.   HENT:      Head: Normocephalic and atraumatic.   Eyes:      Extraocular Movements: Extraocular movements intact.      Pupils: Pupils are equal, round, and reactive to light.   Cardiovascular:      Rate and Rhythm: Normal rate and regular rhythm.      Pulses: Normal pulses.      Heart sounds: Normal heart sounds.   Pulmonary:      Effort: Pulmonary effort is normal.      Breath sounds: Normal breath sounds.   Abdominal:      General: Abdomen is flat. Bowel sounds are normal. There is no distension.      Palpations: Abdomen is soft.      Tenderness: There is no abdominal tenderness.   Musculoskeletal:      Cervical back: Normal range of motion and neck supple.      Right lower leg: No edema.      Left lower leg: No edema.   Skin:     Findings: No lesion or rash.   Neurological:      General: No focal deficit present.      Mental " Status: She is alert and oriented to person, place, and time.               Assessment and Plan:       1. Tooth pain  Assessment & Plan:  With swelling of gum  Will prescribe clindamycin 300 mg Q 8 h for 7 days     Orders:  -     CBC Auto Differential; Future; Expected date: 12/11/2024  -     Comprehensive Metabolic Panel; Future; Expected date: 12/11/2024    2. Hypertension, unspecified type  Assessment & Plan:  Elevated today  Continue amlodipine 5 mg daily  Will check BP at home and keep log and call clinic if blood pressure continues to be elevated       Other orders  -     clindamycin (CLEOCIN) 300 MG capsule; Take 1 capsule (300 mg total) by mouth every 8 (eight) hours.  Dispense: 21 capsule; Refill: 0  -     EPINEPHrine (EPIPEN) 0.3 mg/0.3 mL AtIn; Inject 0.3 mLs (0.3 mg total) into the muscle once. for 1 dose  Dispense: 0.3 mL; Refill: 0            Follow up: Follow up as scheduled

## 2024-12-23 NOTE — TELEPHONE ENCOUNTER
----- Message from [employer] sent at 12/23/2024  8:54 AM CST -----  Regarding: results  .Who Called: Amber Martínez    Caller is requesting information on test results.    Name of Test (Lab/Mammo/Etc): labs  Date of Test: 12/11/24  Where the test was performed: [facility]  Ordering Provider: Boubacar Bauer Method of Contact: Phone Call  Patient's Preferred Phone Number on File: [phone]   Best Call Back Number, if different:  Additional Information: pt requesting a call back with test results

## 2024-12-23 NOTE — ASSESSMENT & PLAN NOTE
Elevated today  Continue amlodipine 5 mg daily  Will check BP at home and keep log and call clinic if blood pressure continues to be elevated

## 2024-12-23 NOTE — TELEPHONE ENCOUNTER
Pt having tooth extraction and needs to make sure her anemia and WBC is stable, please advise     Results faxed to [facility] [phone]

## 2024-12-31 ENCOUNTER — HOSPITAL ENCOUNTER (EMERGENCY)
Facility: HOSPITAL | Age: 54
Discharge: HOME | End: 2024-12-31
Attending: INTERNAL MEDICINE
Payer: MEDICARE

## 2024-12-31 VITALS
BODY MASS INDEX: 20.99 KG/M2 | HEART RATE: 61 BPM | RESPIRATION RATE: 16 BRPM | TEMPERATURE: 98 F | SYSTOLIC BLOOD PRESSURE: 138 MMHG | DIASTOLIC BLOOD PRESSURE: 79 MMHG | WEIGHT: 126 LBS | HEIGHT: 65 IN | OXYGEN SATURATION: 98 %

## 2024-12-31 DIAGNOSIS — G89.29 CHRONIC LEFT-SIDED LOW BACK PAIN WITHOUT SCIATICA: Primary | ICD-10-CM

## 2024-12-31 DIAGNOSIS — M54.50 CHRONIC LEFT-SIDED LOW BACK PAIN WITHOUT SCIATICA: Primary | ICD-10-CM

## 2024-12-31 LAB
BACTERIA #/AREA URNS AUTO: ABNORMAL /HPF
BILIRUB UR QL STRIP.AUTO: NEGATIVE
CLARITY UR: CLEAR
COLOR UR AUTO: YELLOW
GLUCOSE UR QL STRIP: NEGATIVE
HGB UR QL STRIP: ABNORMAL
KETONES UR QL STRIP: NEGATIVE
LEUKOCYTE ESTERASE UR QL STRIP: NEGATIVE
NITRITE UR QL STRIP: NEGATIVE
PH UR STRIP: 5.5 [PH]
PROT UR QL STRIP: NEGATIVE
RBC #/AREA URNS AUTO: ABNORMAL /HPF
SP GR UR STRIP.AUTO: 1.01 (ref 1–1.03)
SQUAMOUS #/AREA URNS AUTO: ABNORMAL /HPF
UROBILINOGEN UR STRIP-ACNC: 0.2
WBC #/AREA URNS AUTO: ABNORMAL /HPF

## 2024-12-31 PROCEDURE — 81003 URINALYSIS AUTO W/O SCOPE: CPT

## 2024-12-31 PROCEDURE — 99285 EMERGENCY DEPT VISIT HI MDM: CPT | Mod: 25

## 2024-12-31 RX ORDER — TIZANIDINE 4 MG/1
2 TABLET ORAL EVERY 6 HOURS PRN
Qty: 14 TABLET | Refills: 0 | Status: SHIPPED | OUTPATIENT
Start: 2024-12-31 | End: 2025-01-07

## 2024-12-31 NOTE — Clinical Note
"Amber Francis" Tayna was seen and treated in our emergency department on 12/31/2024.  She may return to work on 01/02/2025.       If you have any questions or concerns, please don't hesitate to call.       LPN    "

## 2024-12-31 NOTE — DISCHARGE INSTRUCTIONS
Take tizanidine as needed for your back spasms.  May also take Tylenol as needed.  Use a heating pad or a warm bath to help with your pain as well.  Follow up with your primary care provider.  If you experience any new or worsening symptoms return to the emergency department.

## 2024-12-31 NOTE — ED PROVIDER NOTES
Encounter Date: 12/31/2024       History     Chief Complaint   Patient presents with    Back Pain     C/o left side low back pain since beginning of December     See MDM    The history is provided by the patient. No  was used.     Review of patient's allergies indicates:   Allergen Reactions    Ketorolac      Other reaction(s): SOB, Stops breathing    Prednisone Shortness Of Breath     Other reaction(s): SOB    Amoxicillin-pot clavulanate      Other reaction(s): Hives, RASH    Azithromycin      Other reaction(s): RASH    Ciprofloxacin      Other reaction(s): Hives, RASH    Clindamycin      Other reaction(s): Rash    Diphenoxylate-atropine      Other reaction(s): Hives, RASH    Hydrocodone-acetaminophen      Hives, swelling    Iodine      Other reaction(s): Difficulty breathing, Hives, SOB, Swelling    Penicillin      Other reaction(s): Hives, RASH    Shellfish containing products      Other reaction(s): Hives, RASH    Sulfamethoxazole-trimethoprim      Other reaction(s): Hives, RASH    Atropine      Hives    Cefuroxime      Hives    Clonazepam Hives    Diphenoxylate      Hives    Doxycycline     Famotidine      Other Reaction(s): Unknown    Hydrocodone      Hives    Povidone-iodine      Other Reaction(s): Unknown    Sulfamethoxazole      Hives    Trimethoprim      Hives    Zoloft [sertraline] Other (See Comments)     Serotonin syndrome      Past Medical History:   Diagnosis Date    Acute tonsillitis due to other specified organisms 11/15/2024    Chronic ITP (idiopathic thrombocytopenia)     Crohn's disease     Encounter for screening colonoscopy 11/15/2024    GERD (gastroesophageal reflux disease)     Hypertension     Left lower quadrant pain 11/15/2024    Leukopenia     Psychosomatic disorder     Severe anxiety      Past Surgical History:   Procedure Laterality Date    BONE MARROW BIOPSY  06/13/2016    DILATION AND CURETTAGE OF UTERUS  10/31/2018    PARTIAL HYSTERECTOMY       Family History    Problem Relation Name Age of Onset    Diabetes Mother      Hypertension Mother      Heart attack Mother      Heart disease Mother      Diabetes Father      Brain cancer Father      ALYSE disease Father      Heart disease Sister      Diabetes Brother       Social History     Tobacco Use    Smoking status: Never    Smokeless tobacco: Never   Substance Use Topics    Alcohol use: Never    Drug use: Never     Review of Systems   Musculoskeletal:  Positive for back pain.   All other systems reviewed and are negative.      Physical Exam     Initial Vitals [12/31/24 1225]   BP Pulse Resp Temp SpO2   (!) 148/68 67 16 98.1 °F (36.7 °C) 100 %      MAP       --         Physical Exam    Nursing note and vitals reviewed.  Constitutional: She appears well-developed and well-nourished.   HENT:   Head: Normocephalic and atraumatic.   Eyes: EOM are normal.   Neck:   Normal range of motion.  Cardiovascular:  Normal rate, regular rhythm and intact distal pulses.           Pulmonary/Chest: Breath sounds normal.   Abdominal: Abdomen is soft. Bowel sounds are normal. There is no abdominal tenderness.   No right CVA tenderness.  There is left CVA tenderness.   Musculoskeletal:         General: Normal range of motion.      Cervical back: Normal range of motion. No tenderness or bony tenderness. Normal range of motion.      Thoracic back: No tenderness or bony tenderness. Normal range of motion.      Lumbar back: Tenderness (Left-sided lumbar TTP) present. No bony tenderness. Normal range of motion.        Back:       Comments: Patient is ambulatory.  All other adjacent joints otherwise normal       Skin: Skin is warm and dry.   Psychiatric: She has a normal mood and affect.         ED Course   Procedures  Labs Reviewed   URINALYSIS, REFLEX TO URINE CULTURE - Abnormal       Result Value    Color, UA Yellow      Appearance, UA Clear      Specific Gravity, UA 1.015      pH, UA 5.5      Protein, UA Negative      Glucose, UA Negative       Ketones, UA Negative      Blood, UA 1+ (*)     Bilirubin, UA Negative      Urobilinogen, UA 0.2      Nitrites, UA Negative      Leukocyte Esterase, UA Negative     URINALYSIS, MICROSCOPIC - Abnormal    Bacteria, UA Few (*)     RBC, UA 0-5      WBC, UA None Seen      Squamous Epithelial Cells, UA Few (*)           Imaging Results              CT Renal Stone Study ABD Pelvis WO (Final result)  Result time 12/31/24 14:44:29      Final result by Xu Kim MD (12/31/24 14:44:29)                   Impression:      1. No obstructive uropathy identified  2. Small amount of free fluid posterior lower pelvis with ill-defined fullness at the cervical vaginal region.  Clinical correlation is indicated  3. Mild bladder mucosal thickening versus underdistention  4. Diverticulosis coli  5. The appendix is not identified with certainty.  Evaluation is limited secondary to multiple unopacified loops of bowel in the right lower abdomen.  A repeat exam with oral contrast would allow further evaluation if clinically indicated      Electronically signed by: Xu Kim  Date:    12/31/2024  Time:    14:44               Narrative:    EXAMINATION:  CT RENAL STONE STUDY ABD PELVIS WO    CLINICAL HISTORY:  Flank pain, kidney stone suspected;, .    TECHNIQUE:  PATIENT RADIATION DOSE: DLP(mGycm) 147    As per PQRS measures, all CT scans at this facility used dose modulation, iterative reconstruction, and/or weight based dose adjustment when appropriate to reduce radiation dose to as low as reasonably achievable.    COMPARISON:  None available    FINDINGS:  Serial axial images obtained of the abdomen without the administration of IV or oral contrast.  Additional sagittal and coronal reconstructions were performed.Mild Degenerative changes are noted to the thoracolumbar spine.  The heart is borderline enlarged.  Mild atelectasis and/or scarring is evident the lung bases.  There is mild mucosal prominence at the GE junction.  There is  mucosal prominence versus underdistention at the stomach.  The liver, spleen, adrenal glands, and pancreas are grossly within normal limits without the administration of IV contrast.  The kidneys are relatively symmetric in size.  No hydronephrosis is seen.  No renal or ureteral stone is appreciated.  A few small lymph nodes are seen within the periaortic and pericaval region.  There is a paucity of intraperitoneal fat.  No dilated loops of bowel are identified.  Gas and feces are seen within the colon.  The appendix is not identified with certainty.  There is a small amount of free fluid posterior lower pelvis.  A few scattered diverticula are noted to the descending and sigmoid colon.  The uterus is normal in size.  There is ill-defined fullness at the cervical vaginal region.  The bladder is partially distended with fluid.  There is mild bladder mucosal thickening versus underdistention.  Scattered punctate calcifications are noted to the lower pelvis suspicious for phleboliths.  A distal ureteral calcification is considered unlikely given the lack of hydronephrosis.                                       Medications - No data to display  Medical Decision Making  The patient is a 54 y.o. female with a pertinent PMHX of anemia, ITP, GERD, chronic constipation, HTN, anxiety, Crohn's who presents to the Emergency Department with a chief complaint of left lower back pain. Symptoms began 1 month ago and have been constant since onset. Associated symptoms include nothing. The back pain is currently rated as a 9/10 in severity and described as stabbing with no radiation.  Symptoms are aggravated with walking, bending and alleviated with nothing. The patient denies chest pain, shortness of breath, vomiting, diarrhea, hematuria, dysuria, fever. They report taking Tylenol prior to arrival with no relief of symptoms. No other reported symptoms at this time.  Of note, patient states that she has been seeing her internist for  this issue and that they were planning on getting a UA and CT scan to rule out kidney stones.  Patient states that she has a history of UTIs in the past that have been asymptomatic.  Patient's internist is currently out due to COVID which is why she presented here today.    Pertinent physical exam findings include left lower back TTP, and left-sided CVA tenderness.  Vital signs showing HTN, otherwise WNL.  Patient does not want any medication for pain at this time.    UA with 1+ blood, microscopic with no RBCs.  CT renal showing no obstructive uropathy.    Laboratory and imaging findings discussed with patient.  Muscle relaxers since to pharmacy for her back pain.  Advised primary care follow-up.  Patient verbalized understanding and agreement of plan.  Discharge instructions and return precautions provided.  All questions answered.      Amount and/or Complexity of Data Reviewed  Labs: ordered. Decision-making details documented in ED Course.  Radiology: ordered. Decision-making details documented in ED Course.    Risk  Prescription drug management.      Additional MDM:   Differential Diagnosis:   Symptom: Abdominal pain. <> The follow diagnoses were considered and will be evaluated: Renal Stone and Urinary Tract Infection.   Other: The following diagnoses were also considered and will be evaluated: Radiculopathy, Muscle strain.                                   Clinical Impression:  Final diagnoses:  [M54.50, G89.29] Chronic left-sided low back pain without sciatica (Primary)          ED Disposition Condition    Discharge Stable          ED Prescriptions       Medication Sig Dispense Start Date End Date Auth. Provider    tiZANidine (ZANAFLEX) 4 MG tablet Take 0.5 tablets (2 mg total) by mouth every 6 (six) hours as needed (back pain). 14 tablet 12/31/2024 1/7/2025 Nidia Pittman PA-C          Follow-up Information       Follow up With Specialties Details Why Contact Info    Elle Marc MD Internal  Medicine Call in 1 week As needed [address] [facility] [id number]  [phone]               Nidia Pittman PA-C  12/31/24 3210

## 2025-01-07 ENCOUNTER — TELEPHONE (OUTPATIENT)
Dept: INTERNAL MEDICINE | Facility: CLINIC | Age: 55
End: 2025-01-07
Payer: MEDICARE

## 2025-01-07 ENCOUNTER — TELEPHONE (OUTPATIENT)
Dept: HEMATOLOGY/ONCOLOGY | Facility: CLINIC | Age: 55
End: 2025-01-07
Payer: MEDICARE

## 2025-01-07 NOTE — TELEPHONE ENCOUNTER
Patient states she has gone into ER twice in the past few weeks for low platelets. Claims her last platelet count was 150,00.? States that she has had bruising and petechiae on her legs. Denies any other abnormal bleeding. Feels that it is time for an injection to boost her platelets. Has OV next week, 1/14/25. Any recommendations in the meantime?

## 2025-01-09 ENCOUNTER — TELEPHONE (OUTPATIENT)
Dept: INTERNAL MEDICINE | Facility: CLINIC | Age: 55
End: 2025-01-09
Payer: MEDICARE

## 2025-01-09 NOTE — TELEPHONE ENCOUNTER
----- Message from Elle Valentino MD sent at 1/8/2025  6:40 PM CST -----  Please let patient know of normal lab results

## 2025-01-14 ENCOUNTER — TELEPHONE (OUTPATIENT)
Dept: HEMATOLOGY/ONCOLOGY | Facility: CLINIC | Age: 55
End: 2025-01-14
Payer: MEDICARE

## 2025-01-14 DIAGNOSIS — M16.11 PRIMARY OSTEOARTHRITIS OF RIGHT HIP: Primary | ICD-10-CM

## 2025-01-14 DIAGNOSIS — M16.12 PRIMARY OSTEOARTHRITIS OF LEFT HIP: ICD-10-CM

## 2025-01-14 NOTE — TELEPHONE ENCOUNTER
Patient left message that she wanted her records faxed to another facility for ongoing oncology care. Appointments today cancelled. Voice mail forwarded to Gary in medical records.

## 2025-06-19 DIAGNOSIS — M81.0 OSTEOPOROSIS: Primary | ICD-10-CM

## 2025-08-28 ENCOUNTER — TELEPHONE (OUTPATIENT)
Dept: INTERNAL MEDICINE | Facility: CLINIC | Age: 55
End: 2025-08-28
Payer: MEDICARE

## 2025-08-29 DIAGNOSIS — J31.0 CHRONIC RHINITIS: ICD-10-CM

## 2025-08-29 DIAGNOSIS — R42 DIZZINESS AND GIDDINESS: Primary | ICD-10-CM
